# Patient Record
Sex: MALE | Race: WHITE | Employment: FULL TIME | URBAN - METROPOLITAN AREA
[De-identification: names, ages, dates, MRNs, and addresses within clinical notes are randomized per-mention and may not be internally consistent; named-entity substitution may affect disease eponyms.]

---

## 2018-02-26 ENCOUNTER — APPOINTMENT (OUTPATIENT)
Dept: CT IMAGING | Facility: CLINIC | Age: 58
DRG: 329 | End: 2018-02-26
Attending: EMERGENCY MEDICINE
Payer: COMMERCIAL

## 2018-02-26 ENCOUNTER — HOSPITAL ENCOUNTER (INPATIENT)
Facility: CLINIC | Age: 58
LOS: 15 days | Discharge: HOME | DRG: 329 | End: 2018-03-14
Attending: EMERGENCY MEDICINE | Admitting: INTERNAL MEDICINE
Payer: COMMERCIAL

## 2018-02-26 DIAGNOSIS — K56.609 INTESTINAL OBSTRUCTION, UNSPECIFIED CAUSE, UNSPECIFIED WHETHER PARTIAL OR COMPLETE (H): ICD-10-CM

## 2018-02-26 LAB
ALBUMIN SERPL-MCNC: 4.1 G/DL (ref 3.4–5)
ALBUMIN UR-MCNC: NEGATIVE MG/DL
ALP SERPL-CCNC: 75 U/L (ref 40–150)
ALT SERPL W P-5'-P-CCNC: 15 U/L (ref 0–70)
ANION GAP SERPL CALCULATED.3IONS-SCNC: 9 MMOL/L (ref 3–14)
APPEARANCE UR: CLEAR
AST SERPL W P-5'-P-CCNC: 17 U/L (ref 0–45)
BASOPHILS # BLD AUTO: 0 10E9/L (ref 0–0.2)
BASOPHILS NFR BLD AUTO: 0.3 %
BILIRUB SERPL-MCNC: 0.7 MG/DL (ref 0.2–1.3)
BILIRUB UR QL STRIP: NEGATIVE
BUN SERPL-MCNC: 16 MG/DL (ref 7–30)
CALCIUM SERPL-MCNC: 9.5 MG/DL (ref 8.5–10.1)
CAOX CRY #/AREA URNS HPF: ABNORMAL /HPF
CHLORIDE SERPL-SCNC: 104 MMOL/L (ref 94–109)
CO2 SERPL-SCNC: 24 MMOL/L (ref 20–32)
COLOR UR AUTO: YELLOW
CREAT SERPL-MCNC: 0.94 MG/DL (ref 0.66–1.25)
DIFFERENTIAL METHOD BLD: ABNORMAL
EOSINOPHIL # BLD AUTO: 0 10E9/L (ref 0–0.7)
EOSINOPHIL NFR BLD AUTO: 0.1 %
ERYTHROCYTE [DISTWIDTH] IN BLOOD BY AUTOMATED COUNT: 14.1 % (ref 10–15)
GFR SERPL CREATININE-BSD FRML MDRD: 83 ML/MIN/1.7M2
GLUCOSE SERPL-MCNC: 115 MG/DL (ref 70–99)
GLUCOSE UR STRIP-MCNC: NEGATIVE MG/DL
HCT VFR BLD AUTO: 46.1 % (ref 40–53)
HGB BLD-MCNC: 15.8 G/DL (ref 13.3–17.7)
HGB UR QL STRIP: NEGATIVE
IMM GRANULOCYTES # BLD: 0 10E9/L (ref 0–0.4)
IMM GRANULOCYTES NFR BLD: 0.2 %
KETONES UR STRIP-MCNC: 20 MG/DL
LEUKOCYTE ESTERASE UR QL STRIP: NEGATIVE
LIPASE SERPL-CCNC: 122 U/L (ref 73–393)
LYMPHOCYTES # BLD AUTO: 1.2 10E9/L (ref 0.8–5.3)
LYMPHOCYTES NFR BLD AUTO: 10.9 %
MCH RBC QN AUTO: 33.2 PG (ref 26.5–33)
MCHC RBC AUTO-ENTMCNC: 34.3 G/DL (ref 31.5–36.5)
MCV RBC AUTO: 97 FL (ref 78–100)
MONOCYTES # BLD AUTO: 0.8 10E9/L (ref 0–1.3)
MONOCYTES NFR BLD AUTO: 7.3 %
MUCOUS THREADS #/AREA URNS LPF: PRESENT /LPF
NEUTROPHILS # BLD AUTO: 8.8 10E9/L (ref 1.6–8.3)
NEUTROPHILS NFR BLD AUTO: 81.2 %
NITRATE UR QL: NEGATIVE
NRBC # BLD AUTO: 0 10*3/UL
NRBC BLD AUTO-RTO: 0 /100
PH UR STRIP: 5 PH (ref 5–7)
PLATELET # BLD AUTO: 243 10E9/L (ref 150–450)
POTASSIUM SERPL-SCNC: 3.6 MMOL/L (ref 3.4–5.3)
PROT SERPL-MCNC: 8 G/DL (ref 6.8–8.8)
RBC # BLD AUTO: 4.76 10E12/L (ref 4.4–5.9)
RBC #/AREA URNS AUTO: 2 /HPF (ref 0–2)
SODIUM SERPL-SCNC: 137 MMOL/L (ref 133–144)
SOURCE: ABNORMAL
SP GR UR STRIP: 1.03 (ref 1–1.03)
SQUAMOUS #/AREA URNS AUTO: <1 /HPF (ref 0–1)
UROBILINOGEN UR STRIP-MCNC: 2 MG/DL (ref 0–2)
WBC # BLD AUTO: 10.8 10E9/L (ref 4–11)
WBC #/AREA URNS AUTO: <1 /HPF (ref 0–2)

## 2018-02-26 PROCEDURE — 99285 EMERGENCY DEPT VISIT HI MDM: CPT | Mod: 25

## 2018-02-26 PROCEDURE — 25000128 H RX IP 250 OP 636: Performed by: EMERGENCY MEDICINE

## 2018-02-26 PROCEDURE — 80053 COMPREHEN METABOLIC PANEL: CPT | Performed by: EMERGENCY MEDICINE

## 2018-02-26 PROCEDURE — 74176 CT ABD & PELVIS W/O CONTRAST: CPT

## 2018-02-26 PROCEDURE — 81001 URINALYSIS AUTO W/SCOPE: CPT

## 2018-02-26 PROCEDURE — 85025 COMPLETE CBC W/AUTO DIFF WBC: CPT | Performed by: EMERGENCY MEDICINE

## 2018-02-26 PROCEDURE — 96361 HYDRATE IV INFUSION ADD-ON: CPT

## 2018-02-26 PROCEDURE — 83690 ASSAY OF LIPASE: CPT | Performed by: EMERGENCY MEDICINE

## 2018-02-26 PROCEDURE — 96375 TX/PRO/DX INJ NEW DRUG ADDON: CPT

## 2018-02-26 PROCEDURE — 96374 THER/PROPH/DIAG INJ IV PUSH: CPT

## 2018-02-26 RX ORDER — HYDROMORPHONE HYDROCHLORIDE 1 MG/ML
0.5 INJECTION, SOLUTION INTRAMUSCULAR; INTRAVENOUS; SUBCUTANEOUS
Status: COMPLETED | OUTPATIENT
Start: 2018-02-26 | End: 2018-02-27

## 2018-02-26 RX ORDER — ONDANSETRON 2 MG/ML
4 INJECTION INTRAMUSCULAR; INTRAVENOUS
Status: COMPLETED | OUTPATIENT
Start: 2018-02-26 | End: 2018-02-26

## 2018-02-26 RX ADMIN — SODIUM CHLORIDE 500 ML: 9 INJECTION, SOLUTION INTRAVENOUS at 23:33

## 2018-02-26 RX ADMIN — Medication 0.5 MG: at 23:33

## 2018-02-26 RX ADMIN — ONDANSETRON 4 MG: 2 INJECTION INTRAMUSCULAR; INTRAVENOUS at 23:33

## 2018-02-26 ASSESSMENT — ENCOUNTER SYMPTOMS
SHORTNESS OF BREATH: 0
DYSURIA: 0
ABDOMINAL PAIN: 1
DIARRHEA: 0
VOMITING: 0
BACK PAIN: 1
BLOOD IN STOOL: 0
FREQUENCY: 0
CONSTIPATION: 1

## 2018-02-26 NOTE — IP AVS SNAPSHOT
[place] [address] [facility]    [address]    [facility] [address]    Phone:  [phone]    Fax:  [phone]                                       After Visit Summary   2/26/2018    Gab Holloway    MRN: [id number]           After Visit Summary Signature Page     I have received my discharge instructions, and my questions have been answered. I have discussed any challenges I see with this plan with the nurse or doctor.    ..........................................................................................................................................  Patient/Patient Representative Signature      ..........................................................................................................................................  Patient Representative Print Name and Relationship to Patient    ..................................................               ................................................  Date                                            Time    ..........................................................................................................................................  Reviewed by Signature/Title    ...................................................              ..............................................  Date                                                            Time

## 2018-02-26 NOTE — IP AVS SNAPSHOT
MRN:[id number]                      After Visit Summary   2/26/2018    Gab Holloway    MRN: [id number]           Thank you!     Thank you for choosing [facility] for your care. Our goal is always to provide you with excellent care. Hearing back from our patients is one way we can continue to improve our services. Please take a few minutes to complete the written survey that you may receive in the mail after you visit. If you would like to speak to someone directly about your visit please contact Patient Relations at [phone]. Thank you!          Patient Information     Date Of Birth          1960        Designated Caregiver       Most Recent Value    Caregiver    Will someone help with your care after discharge? yes    Name of designated caregiver Neela    Phone number of caregiver see chart    Caregiver address same      About your hospital stay     You were admitted on:  February 27, 2018 You last received care in the:  [place] [address] [facility]    You were discharged on:  March 14, 2018        Reason for your hospital stay       This patient was in the hospital for surgery                  Who to Call     For medical emergencies, please call 911.  For non-urgent questions about your medical care, please call your primary care provider or clinic, [phone]  For questions related to your surgery, please call your surgery clinic        Attending Provider     Provider Specialty    Nicole Edmonds MD Emergency Medicine    [facility], Sharonda Pelaez MD Internal Medicine    Catrina fierro MD Internal Medicine       Primary Care Provider Office Phone # Fax #    Bry Andrews [phone]      After Care Instructions     Activity       Your activity upon discharge: No heavy lifting >10-15lbs for 6 weeks.     Do not drive while taking narcotic pain medication.    Get regular activity and try to walk for a total of 30 minutes.  Start slow by walking 5-10 minutes at a time, increasing each day to 30 minutes at one time. Slowly return to your regular level of activity. Rest as needed.            Diet       Follow this diet upon discharge: LOW FIBER DIET  A low fiber diet helps to decrease size and frequency of stools    Avoid nuts, seeds, raw vegetables.     Try foods such as:  - beef, poultry, fish, eggs, smooth peanut butter, dairy (as tolerated)  - refined or white flour products (like white bread, white pasta, etc)            Discharge Instructions       Please call the clinic if:  - fever greater than 101 degrees  - any signs of infection (increasing redness, swelling, tenderness, warmth, change in appearance, increased drainage)  - blood in urine or stool  - severe pain that is not relieved by medicine, rest, or ice    Or as questions or concerns arise. Contact clinic at [phone]    Call 841 if you feel you are having a medical emergency                  Follow-up Appointments     Follow-up and recommended labs and tests        Follow up in the [place] office on 3/21/18 with Dr. Delcid at 10:00am, please arrive 20 minutes early to complete paperwork. Call [phone] to reschedule your appointment as needed. Call the office at [phone] if you develop fever, uncontrolled pain, bleeding, nausea, vomiting, or constipation.    [place] Office:   [address]  [place] [address]                  Further instructions from your care team       1. [facility] Dr [facility] -Arranged follow-up as an outpatient in 3 months for the 1st surveillance visit.    [address] [facility] [address], [place] [address]   Phone: [phone]      2. [facility]-Establish care with Dr Andrews for primary care.    Address  [address] [place] [address]   Contact  Appointments: [phone])   Information: [phone]   Fax: [phone]   Urgent Care: [phone]         Pending Results     No  "orders found from 2018 to 2018.            Statement of Approval     Ordered          18 1359  I have reviewed and agree with all the recommendations and orders detailed in this document.  EFFECTIVE NOW     Approved and electronically signed by:  Orville Crain MD             Admission Information     Date & Time Provider Department Dept. Phone    2018 Catrina Rey MD [place] [address] [facility] [phone]      Your Vitals Were     Blood Pressure Pulse Temperature Respirations Height Weight    111/63 (BP Location: Left arm) 91 96.7  F (35.9  C) (Oral) 18 1.778 m (5' 10\") 84.6 kg (186 lb 9.6 oz)    Pulse Oximetry BMI (Body Mass Index)                96% 26.77 kg/m2          MyChart Information     [employer] lets you send messages to your doctor, view your test results, renew your prescriptions, schedule appointments and more. To sign up, go to [email or web address] . Click on \"Log in\" on the left side of the screen, which will take you to the Welcome page. Then click on \"Sign up Now\" on the right side of the page.     You will be asked to enter the access code listed below, as well as some personal information. Please follow the directions to create your username and password.     Your access code is: [email or web address]  Expires: 2018  9:51 AM     Your access code will  in 90 days. If you need help or a new code, please call your [place] clinic or [phone].        Care EveryWhere ID     This is your Care EveryWhere ID. This could be used by other organizations to access your [place] medical records  [id number]        Equal Access to Services     [facility] : Hadii florencia Whatley, kiko brantley, mitra ray. So [facility] [phone].    ATENCIÓN: Si habla español, tiene a hannon disposición servicios gratuitos de asistencia lingüística. Llame al [phone].    We comply with applicable federal civil rights " laws and [place] laws. We do not discriminate on the basis of race, color, national origin, age, disability, sex, sexual orientation, or gender identity.               Review of your medicines      START taking        Dose / Directions    enoxaparin 40 MG/0.4ML injection   Commonly known as:  LOVENOX   Used for:  Intestinal obstruction, unspecified cause, unspecified whether partial or complete        Dose:  40 mg   Inject 0.4 mLs (40 mg) Subcutaneous every 24 hours   Quantity:  17 Syringe   Refills:  0         STOP taking     UNABLE TO FIND                Where to get your medicines      These medications were sent to [facility] [place], [place] - [address] [facility]  [address] [facility] [address]     Phone:  [phone]     enoxaparin 40 MG/0.4ML injection                Protect others around you: Learn how to safely use, store and throw away your medicines at www.disposemymeds.org.             Medication List: This is a list of all your medications and when to take them. Check marks below indicate your daily home schedule. Keep this list as a reference.      Medications           Morning Afternoon Evening Bedtime As Needed    enoxaparin 40 MG/0.4ML injection   Commonly known as:  LOVENOX   Inject 0.4 mLs (40 mg) Subcutaneous every 24 hours   Last time this was given:  40 mg on 3/14/2018 12:02 PM   Next Dose Due:  Tomorrow, Thurs. 3/15                                          More Information        Barium Enema     X-rays will be taken during your exam.     A barium enema is an X-ray exam of your rectum and colon. This test helps your healthcare provider find problems such as blockages, tumors, polyps, or other problems.  Before your test    Your healthcare provider will give you detailed instructions on how to prepare for this test.    On the day before your test, you will likely be told not to eat and to drink only clear liquids. Do not eat or drink anything after midnight  the night before, although you may take your regularly prescribed oral medicine in the morning with a small amount of water.    You will also be told to take a laxative or an over-the-counter enema preparation. Follow your healthcare provider's instructions.     Let the technologist know about the following before the test:    Symptoms you re having    Allergies    Any previous surgery    Medicines you take    If you're pregnant or think you may be      During your test    A tube is inserted into your rectum.    Your colon is filled with barium, a liquid that allows the radiologist to see what the inside of your bowel looks like with the use of X-ray images. Air is also usually placed into your bowel through the tube to allow for better visualization.    You will be asked to move into different positions and hold your breath while X-rays are taken.    Pressure may be applied to your belly to get the best images.    The last X-ray will be taken after you go to the bathroom.  After your test    Drink plenty of water to relieve constipation you may have after the test.    Your stool may appear white or light for a day or two.    Your healthcare provider will discuss the test results with you during a follow-up visit or over the phone.    Your next appointment is: ________________  Date Last Reviewed: 2/1/2017 2000-2017 The "[employer]. [address] [place] [address], [place] [address]. All rights reserved. This information is not intended as a substitute for professional medical care. Always follow your healthcare professional's instructions.

## 2018-02-26 NOTE — LETTER
Transition Communication Hand-off for Care Transitions to Next Level of Care Provider    Name: Gab Holloway  : 1960  MRN #: [id number]  Primary Care Provider: Bry Andrews     Primary Clinic: [address]  [facility] [address]     Reason for Hospitalization:  Intestinal obstruction, unspecified cause, unspecified whether partial or complete [K56.609]  Admit Date/Time: 2018 10:16 PM  Discharge Date: 3/14/18  Payor Source: Payor: PREFERREDONE / Plan: PREFERREDONE [employer] / Product Type: HMO /     Readmission Assessment Measure (MADY) Risk Score/category: ELEVATED         Reason for Communication Hand-off Referral: Multiple providers/specialties  Other prolonged hospital stay with new ostomy    Discharge Plan:       Concern for non-adherence with plan of care:   NO  Discharge Needs Assessment:  Needs       Most Recent Value    Equipment Currently Used at Home none    Transportation Available car, family or friend will provide    # of Referrals Placed by CTS Scheduled Follow-up appointments          Already enrolled in Tele-monitoring program and name of program:  na  Follow-up specialty is recommended: Yes    Follow-up plan:  No future appointments.    Any outstanding tests or procedures:             Follow-up and recommended labs and tests        Follow up in the [place] office on 3/21/18 with Dr. Delcid at 10:00am, please arrive 20 minutes early to complete paperwork. Call [phone] to reschedule your appointment as needed. Call the office at [phone] if you develop fever, uncontrolled pain, bleeding, nausea, vomiting, or constipation.     [place] Office:   [address]   [place] [address]                     Further instructions from your care team      1. [facility] -Arranged follow-up as an outpatient in 3 months for the 1st surveillance visit.     [address], [place] [address]   Phone: [phone]        2. [facility]-Establish care with Dr Anrdews for primary care.         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  MADY ELEVATED. New ostomy which he is managing well and didn't require home care [facility] at [place].  He will need f/u with Dr Ti JAVIER in 3 months for surveillance.  He also will establish care with Dr Andrews for primary care.      Debbie Paulino    AVS/Discharge Summary is the source of truth; this is a helpful guide for improved communication of patient story

## 2018-02-27 ENCOUNTER — APPOINTMENT (OUTPATIENT)
Dept: GENERAL RADIOLOGY | Facility: CLINIC | Age: 58
DRG: 329 | End: 2018-02-27
Attending: PHYSICIAN ASSISTANT
Payer: COMMERCIAL

## 2018-02-27 ENCOUNTER — APPOINTMENT (OUTPATIENT)
Dept: CT IMAGING | Facility: CLINIC | Age: 58
DRG: 329 | End: 2018-02-27
Attending: PHYSICIAN ASSISTANT
Payer: COMMERCIAL

## 2018-02-27 PROBLEM — K56.609 COLON OBSTRUCTION (H): Status: ACTIVE | Noted: 2018-02-27

## 2018-02-27 LAB
ANION GAP SERPL CALCULATED.3IONS-SCNC: 7 MMOL/L (ref 3–14)
BASOPHILS # BLD AUTO: 0 10E9/L (ref 0–0.2)
BASOPHILS NFR BLD AUTO: 0.2 %
BUN SERPL-MCNC: 14 MG/DL (ref 7–30)
CALCIUM SERPL-MCNC: 8.5 MG/DL (ref 8.5–10.1)
CHLORIDE SERPL-SCNC: 107 MMOL/L (ref 94–109)
CO2 SERPL-SCNC: 25 MMOL/L (ref 20–32)
CREAT SERPL-MCNC: 0.86 MG/DL (ref 0.66–1.25)
DIFFERENTIAL METHOD BLD: ABNORMAL
EOSINOPHIL # BLD AUTO: 0 10E9/L (ref 0–0.7)
EOSINOPHIL NFR BLD AUTO: 0.1 %
ERYTHROCYTE [DISTWIDTH] IN BLOOD BY AUTOMATED COUNT: 14.5 % (ref 10–15)
GFR SERPL CREATININE-BSD FRML MDRD: >90 ML/MIN/1.7M2
GLUCOSE SERPL-MCNC: 121 MG/DL (ref 70–99)
HCT VFR BLD AUTO: 44.1 % (ref 40–53)
HGB BLD-MCNC: 14.8 G/DL (ref 13.3–17.7)
IMM GRANULOCYTES # BLD: 0 10E9/L (ref 0–0.4)
IMM GRANULOCYTES NFR BLD: 0.4 %
LYMPHOCYTES # BLD AUTO: 1.3 10E9/L (ref 0.8–5.3)
LYMPHOCYTES NFR BLD AUTO: 12.4 %
MCH RBC QN AUTO: 33.2 PG (ref 26.5–33)
MCHC RBC AUTO-ENTMCNC: 33.6 G/DL (ref 31.5–36.5)
MCV RBC AUTO: 99 FL (ref 78–100)
MONOCYTES # BLD AUTO: 0.8 10E9/L (ref 0–1.3)
MONOCYTES NFR BLD AUTO: 7.9 %
NEUTROPHILS # BLD AUTO: 8.3 10E9/L (ref 1.6–8.3)
NEUTROPHILS NFR BLD AUTO: 79 %
NRBC # BLD AUTO: 0 10*3/UL
NRBC BLD AUTO-RTO: 0 /100
PLATELET # BLD AUTO: 212 10E9/L (ref 150–450)
POTASSIUM SERPL-SCNC: 3.9 MMOL/L (ref 3.4–5.3)
RBC # BLD AUTO: 4.46 10E12/L (ref 4.4–5.9)
SODIUM SERPL-SCNC: 139 MMOL/L (ref 133–144)
WBC # BLD AUTO: 10.5 10E9/L (ref 4–11)

## 2018-02-27 PROCEDURE — 40000901 ZZH STATISTIC WOC PT EDUCATION, 0-15 MIN

## 2018-02-27 PROCEDURE — 99207 ZZC APP CREDIT; MD BILLING SHARED VISIT: CPT | Performed by: HOSPITALIST

## 2018-02-27 PROCEDURE — 71260 CT THORAX DX C+: CPT

## 2018-02-27 PROCEDURE — 99222 1ST HOSP IP/OBS MODERATE 55: CPT | Mod: AI | Performed by: INTERNAL MEDICINE

## 2018-02-27 PROCEDURE — 74283 THER NMA RDCTJ INTUS/OBSTRCJ: CPT

## 2018-02-27 PROCEDURE — 25000128 H RX IP 250 OP 636: Performed by: INTERNAL MEDICINE

## 2018-02-27 PROCEDURE — 36415 COLL VENOUS BLD VENIPUNCTURE: CPT | Performed by: INTERNAL MEDICINE

## 2018-02-27 PROCEDURE — 85025 COMPLETE CBC W/AUTO DIFF WBC: CPT | Performed by: INTERNAL MEDICINE

## 2018-02-27 PROCEDURE — 25000128 H RX IP 250 OP 636: Performed by: EMERGENCY MEDICINE

## 2018-02-27 PROCEDURE — 96376 TX/PRO/DX INJ SAME DRUG ADON: CPT

## 2018-02-27 PROCEDURE — 12000000 ZZH R&B MED SURG/OB

## 2018-02-27 PROCEDURE — 25000128 H RX IP 250 OP 636: Performed by: PHYSICIAN ASSISTANT

## 2018-02-27 PROCEDURE — 25000125 ZZHC RX 250: Performed by: PHYSICIAN ASSISTANT

## 2018-02-27 PROCEDURE — 80048 BASIC METABOLIC PNL TOTAL CA: CPT | Performed by: INTERNAL MEDICINE

## 2018-02-27 RX ORDER — ONDANSETRON 2 MG/ML
4 INJECTION INTRAMUSCULAR; INTRAVENOUS EVERY 6 HOURS PRN
Status: DISCONTINUED | OUTPATIENT
Start: 2018-02-27 | End: 2018-03-01

## 2018-02-27 RX ORDER — NALOXONE HYDROCHLORIDE 0.4 MG/ML
.1-.4 INJECTION, SOLUTION INTRAMUSCULAR; INTRAVENOUS; SUBCUTANEOUS
Status: DISCONTINUED | OUTPATIENT
Start: 2018-02-27 | End: 2018-03-01

## 2018-02-27 RX ORDER — LORAZEPAM 2 MG/ML
0.5 INJECTION INTRAMUSCULAR ONCE
Status: COMPLETED | OUTPATIENT
Start: 2018-02-27 | End: 2018-02-27

## 2018-02-27 RX ORDER — LIDOCAINE 40 MG/G
2.5 CREAM TOPICAL
Status: DISCONTINUED | OUTPATIENT
Start: 2018-02-27 | End: 2018-03-06

## 2018-02-27 RX ORDER — LIDOCAINE 40 MG/G
CREAM TOPICAL
Status: DISCONTINUED | OUTPATIENT
Start: 2018-02-27 | End: 2018-02-28 | Stop reason: HOSPADM

## 2018-02-27 RX ORDER — LORAZEPAM 0.5 MG/1
.5-1 TABLET ORAL EVERY 4 HOURS PRN
Status: DISCONTINUED | OUTPATIENT
Start: 2018-02-27 | End: 2018-03-14 | Stop reason: HOSPADM

## 2018-02-27 RX ORDER — SODIUM CHLORIDE 9 MG/ML
INJECTION, SOLUTION INTRAVENOUS ONCE
Status: DISCONTINUED | OUTPATIENT
Start: 2018-02-27 | End: 2018-02-27

## 2018-02-27 RX ORDER — IOPAMIDOL 755 MG/ML
500 INJECTION, SOLUTION INTRAVASCULAR ONCE
Status: COMPLETED | OUTPATIENT
Start: 2018-02-27 | End: 2018-02-27

## 2018-02-27 RX ORDER — SODIUM CHLORIDE 9 MG/ML
INJECTION, SOLUTION INTRAVENOUS CONTINUOUS
Status: DISCONTINUED | OUTPATIENT
Start: 2018-02-27 | End: 2018-02-28

## 2018-02-27 RX ORDER — CALCIUM CARBONATE 500 MG/1
500 TABLET, CHEWABLE ORAL DAILY PRN
Status: DISCONTINUED | OUTPATIENT
Start: 2018-02-27 | End: 2018-03-14 | Stop reason: HOSPADM

## 2018-02-27 RX ORDER — HYDROMORPHONE HYDROCHLORIDE 1 MG/ML
.3-.5 INJECTION, SOLUTION INTRAMUSCULAR; INTRAVENOUS; SUBCUTANEOUS
Status: DISCONTINUED | OUTPATIENT
Start: 2018-02-27 | End: 2018-03-14 | Stop reason: HOSPADM

## 2018-02-27 RX ORDER — LORAZEPAM 2 MG/ML
0.5 INJECTION INTRAMUSCULAR EVERY 6 HOURS PRN
Status: DISCONTINUED | OUTPATIENT
Start: 2018-02-27 | End: 2018-03-14 | Stop reason: HOSPADM

## 2018-02-27 RX ORDER — ONDANSETRON 4 MG/1
4 TABLET, ORALLY DISINTEGRATING ORAL EVERY 6 HOURS PRN
Status: DISCONTINUED | OUTPATIENT
Start: 2018-02-27 | End: 2018-03-14 | Stop reason: HOSPADM

## 2018-02-27 RX ORDER — ACETAMINOPHEN 10 MG/ML
1000 INJECTION, SOLUTION INTRAVENOUS EVERY 8 HOURS PRN
Status: DISCONTINUED | OUTPATIENT
Start: 2018-02-27 | End: 2018-03-02

## 2018-02-27 RX ORDER — PROCHLORPERAZINE 25 MG
25 SUPPOSITORY, RECTAL RECTAL EVERY 12 HOURS PRN
Status: DISCONTINUED | OUTPATIENT
Start: 2018-02-27 | End: 2018-03-14 | Stop reason: HOSPADM

## 2018-02-27 RX ORDER — PROCHLORPERAZINE MALEATE 5 MG
10 TABLET ORAL EVERY 6 HOURS PRN
Status: DISCONTINUED | OUTPATIENT
Start: 2018-02-27 | End: 2018-03-14 | Stop reason: HOSPADM

## 2018-02-27 RX ADMIN — Medication 0.5 MG: at 01:14

## 2018-02-27 RX ADMIN — Medication 0.5 MG: at 00:13

## 2018-02-27 RX ADMIN — Medication 0.5 MG: at 20:42

## 2018-02-27 RX ADMIN — Medication 0.5 MG: at 12:40

## 2018-02-27 RX ADMIN — PROCHLORPERAZINE EDISYLATE 10 MG: 5 INJECTION INTRAMUSCULAR; INTRAVENOUS at 22:40

## 2018-02-27 RX ADMIN — Medication 0.5 MG: at 14:23

## 2018-02-27 RX ADMIN — SODIUM CHLORIDE: 9 INJECTION, SOLUTION INTRAVENOUS at 19:36

## 2018-02-27 RX ADMIN — ONDANSETRON 4 MG: 2 INJECTION INTRAMUSCULAR; INTRAVENOUS at 19:32

## 2018-02-27 RX ADMIN — SODIUM CHLORIDE: 9 INJECTION, SOLUTION INTRAVENOUS at 02:38

## 2018-02-27 RX ADMIN — SODIUM CHLORIDE 65 ML: 9 INJECTION, SOLUTION INTRAVENOUS at 18:54

## 2018-02-27 RX ADMIN — LORAZEPAM 0.5 MG: 2 INJECTION INTRAMUSCULAR; INTRAVENOUS at 20:53

## 2018-02-27 RX ADMIN — Medication 0.5 MG: at 03:05

## 2018-02-27 RX ADMIN — LORAZEPAM 0.5 MG: 2 INJECTION INTRAMUSCULAR; INTRAVENOUS at 03:05

## 2018-02-27 RX ADMIN — IOPAMIDOL 100 ML: 755 INJECTION, SOLUTION INTRAVENOUS at 18:54

## 2018-02-27 RX ADMIN — ACETAMINOPHEN 1000 MG: 10 INJECTION, SOLUTION INTRAVENOUS at 03:23

## 2018-02-27 RX ADMIN — DIATRIZOATE MEGLUMINE AND DIATRIZOATE SODIUM 480 ML: 660; 100 SOLUTION ORAL; RECTAL at 11:17

## 2018-02-27 RX ADMIN — PANTOPRAZOLE SODIUM 40 MG: 40 INJECTION, POWDER, FOR SOLUTION INTRAVENOUS at 22:45

## 2018-02-27 NOTE — PLAN OF CARE
Problem: Patient Care Overview  Goal: Plan of Care/Patient Progress Review  PRIMARY DIAGNOSIS: Acute abdominal pain  OUTPATIENT/OBSERVATION GOALS TO BE MET BEFORE DISCHARGE:  1. Pain Status: Improved but still requiring IV narcotics.    2. Return to near baseline physical activity: Yes    3. Cleared for discharge by consultants (if involved): No    Discharge Planner Nurse   Safe discharge environment identified: Yes  Barriers to discharge: Yes       Entered by: Nika Rincon 02/27/2018 5:20 AM     A & O x4, VSS. C/o sharp 10/10 pain to lower abdomen around to back. Prn acetaminophen and iv dilaudid given. One time ativan ordered given as well. Denies SOB or nausea. BS hypoactive x4. Pt NPO, up independent in room. Wife at bedside. IVF infusing. Plan for colorectal surgery consult in the AM. Will continue to monitor.      Please review provider order for any additional goals.   Nurse to notify provider when observation goals have been met and patient is ready for discharge.

## 2018-02-27 NOTE — ED NOTES
[facility]  ED Nurse Handoff Report    Gab Holloway is a 57 year old male   ED Chief complaint: Abdominal Pain  . ED Diagnosis:   Final diagnoses:   Intestinal obstruction, unspecified cause, unspecified whether partial or complete     Allergies: No Known Allergies    Code Status: Full Code  Activity level - Baseline/Home:  Independent. Activity Level - Current:   Independent. Lift room needed: No. Bariatric: No   Needed: No   Isolation: No. Infection: Not Applicable.     Vital Signs:   Vitals:    02/26/18 2156 02/26/18 2157 02/26/18 2345 02/27/18 0015   BP:  147/89 136/85 130/77   Pulse: 100   91   Resp: 16      Temp: 98.1  F (36.7  C)      TempSrc: Oral      SpO2: 98%  98% 99%       Cardiac Rhythm:  ,      Pain level: 0-10 Pain Scale: 9  Patient confused: No. Patient Falls Risk: No.   Elimination Status: Has voided   Patient Report - Initial Complaint: Abdominal pain. Focused Assessment: A&O x4, ABCDs intact. Pt has abdominal pain across lower portion of abdomen. Pt states the pain is radiating to his back.   Tests Performed: labs, radiology. Abnormal Results:   Labs Ordered and Resulted from Time of ED Arrival Up to the Time of Departure from the ED   ROUTINE UA WITH MICROSCOPIC - Abnormal; Notable for the following:        Result Value    Ketones Urine 20 (*)     Mucous Urine Present (*)     Calcium Oxalate Few (*)     All other components within normal limits   CBC WITH PLATELETS DIFFERENTIAL - Abnormal; Notable for the following:     MCH 33.2 (*)     Absolute Neutrophil 8.8 (*)     All other components within normal limits   COMPREHENSIVE METABOLIC PANEL - Abnormal; Notable for the following:     Glucose 115 (*)     All other components within normal limits   LIPASE     Abd/pelvis CT no contrast - Stone Protocol   Final Result   IMPRESSION:   1. Short segment of focal colonic wall thickening with luminal   narrowing in the upper descending colon.   2. Mild associated distention  of the colon proximal to this consistent   with at least partial/early associated mechanical obstruction.   3. This is most likely neoplasm. Differential includes focal colitis   and/or stricture.   4. Cholelithiasis.      Findings discussed with the ER physician at 12:15 a.m.      AYALA BERNABE MD        .   Treatments provided: IVF, Dilaudid  Family Comments: NA  OBS brochure/video discussed/provided to patient:  N/A  ED Medications:   Medications   sodium chloride 0.9% infusion (not administered)   0.9% sodium chloride BOLUS (0 mLs Intravenous Stopped 2/27/18 0014)   HYDROmorphone (PF) (DILAUDID) injection 0.5 mg (0.5 mg Intravenous Given 2/27/18 0114)   ondansetron (ZOFRAN) injection 4 mg (4 mg Intravenous Given 2/26/18 5673)     Drips infusing:  No  For the majority of the shift, the patient's behavior Green. Interventions performed were NA.     Severe Sepsis OR Septic Shock Diagnosis Present: No      ED Nurse Name/Phone Number: Kumar Miller,   1:34 AM      RECEIVING UNIT ED HANDOFF REVIEW    Above ED Nurse Handoff Report was reviewed: Yes  Reviewed by: Nika Rincon on February 27, 2018 at 1:48 AM

## 2018-02-27 NOTE — PHARMACY-ADMISSION MEDICATION HISTORY
"Admission medication history interview status for this patient is complete. See [facility] admission navigator for allergy information, prior to admission medications and immunization status.     Medication history interview source(s): Patient  Medication history resources (including written lists, pill bottles, clinic record): None    Changes made to PTA medication list:  Added: The people's  multivitamin  Deleted: None   Changed: None  For patients on insulin therapy: N    Actions taken by pharmacist (provider contacted, etc): None     Additional medication history information: Upon looking at [employer]  website, I found that the pt is likely talking about the product called \"Cinnergy\" as it's the only \"multivitamin\" that has milk thistle and cinnamon that the pt mentioned.    Medication reconciliation/reorder completed by provider prior to medication history? No    Prior to Admission medications    Medication Sig Last Dose Taking? Auth Provider   UNABLE TO FIND 2 tablets daily MEDICATION NAME: The people's  multivitamin 2/26/2018 Yes Unknown, Entered By History           "

## 2018-02-27 NOTE — ED PROVIDER NOTES
"  History     Chief Complaint:  Abdominal Pain    HPI   Gab Holloway is a 57 year old male who presents to the emergency department today for evaluation of abdominal pain. The patient states that on Saturday morning, 2 days ago, he had an episode of brief, sharp left back pain that spontaneously resolved, however throughout the day he endorsed mild back pain with \"gas gurgling\" in his abdomen at night. He reports the pain has been intermittently radiating from the center of his back, bilaterally wrapping around into his lower abdomen, however the pain in his back was not severe until tonight when he was driving to work. He states the pain is currently a 10/10. He reports pain seems to be aggravated by eating. The patient reports he usually has multiple bowel movements in one day, but his last bowel movement was yesterday morning. He reports that he notice some streaking red color in his stool last night, however he did eat some pimentos. He states that when he wiped there was some blood. The patient denies previous abdominal surgeries. The patient denies any medical problems, daily medications, history of high blood pressure, or history of diabetes. The patient denies recent falls or injuries. The patient denies vomiting, diarrhea, obvious black or bloody stool, urinary symptoms, chest pain, shortness of breath, testicular pain, groin pain, or penile pain.    Allergies:  No Known Drug Allergies    Medications:    Medications reviewed. No current medications.     Past Medical History:    Medical history reviewed. No pertinent medical history.    Past Surgical History:    Surgical history reviewed. No pertinent surgical history.    Family History:    Family history reviewed. No pertinent family history.     Social History:  The patient was accompanied to the ED by wife.  Marital Status:   [2]    Review of Systems   Respiratory: Negative for shortness of breath.    Cardiovascular: Negative for chest pain. "   Gastrointestinal: Positive for abdominal pain (lower) and constipation. Negative for blood in stool, diarrhea and vomiting.   Genitourinary: Negative for dysuria, frequency, penile pain and testicular pain.   Musculoskeletal: Positive for back pain (central; radiating bilaterally across to the lower abdomen).   All other systems reviewed and are negative.    Physical Exam   Patient Vitals for the past 24 hrs:   BP Temp Temp src Pulse Heart Rate Resp SpO2 Height Weight   02/27/18 0130 141/79 - - 99 99 - (!) 86 % - -   02/27/18 0115 (!) 134/95 - - - - - 97 % - -   02/27/18 0100 142/87 - - - - - 95 % - -   02/27/18 0045 (!) 150/93 - - - - - 98 % - -   02/27/18 0030 135/78 - - - - - 95 % - -   02/27/18 0015 130/77 - - 91 91 - 99 % - -   02/26/18 2345 136/85 - - - - - 98 % - -   02/26/18 2157 147/89 - - - - - - - -   02/26/18 2156 - 98.1  F (36.7  C) Oral 100 100 16 98 % - -     Physical Exam  Gen: alert, appears uncomfortable  HEENT: PERRL, oropharynx clear  Neck: normal ROM  CV: RRR, no murmurs  Pulm: breath sounds equal, lungs clear  Abd: Soft, nontender, mild distension, BS present but diminished  Back: no evidence of injury, no CVA tenderness, no midline tenderness  MSK: no deformity, moves all extremities  Skin: no rash  Neuro: alert, appropriate conversation and interaction    Emergency Department Course     Imaging:  Radiology findings were communicated with the patient who voiced understanding of the findings.    Abd/pelvis CT no contrast - Stone Protocol  1. Short segment of focal colonic wall thickening with luminal narrowing in the upper descending colon.  2. Mild associated distention of the colon proximal to this consistent with at least partial associated mechanical obstruction.  3. This is most likely neoplasm. Differential includes focal colitis and/or stricture.  4. Cholelithiasis.  Findings discussed with the ER physician at 12:15 a.m.  Reading per radiology    Laboratory:  Laboratory findings were  communicated with the patient who voiced understanding of the findings.    CBC: WBC 10.8, HGB 15.8,   CMP: Glucose: 115 (H), o/w WNL (Creatinine 0.94)  Lipase: 122  UA: Ketones: 20 (A), Mucous: Present (A), Calcium Oxide: Few (A)    Interventions:  2333 Zofran 4 mg IV  2333  ml IV  2333 Dilaudid 0.5 mg IV  0013 Dilaudid 0.5 mg IV  0114 Dilaudid 0.5 mg IV    Emergency Department Course:    2230 Nursing notes and vitals reviewed.    2304 The patient provided a urine sample here in the emergency department. This was sent for laboratory testing, findings above.    2304 I performed an exam of the patient as documented above.     2320 IV was inserted and blood was drawn for laboratory testing, results above.    2355 The patient was sent for a Abd/pelvis CT no contrast - Stone Protocol while in the emergency department, results above.     0014  I received a call from Dr. Garrett of Radiology regarding imaging findings.     0032 I personally reviewed the imaging and laboratory results with the patient and answered all related questions. I discussed the treatment plan with the patient. They expressed understanding of this plan and consented to admission.     0059 I consulted with Dr. Lan of Colorectal Surgery regarding the patient's presentation and findings.    0106 I discussed the patient with Dr. Sales, who will admit the patient to a monitored bed for further evaluation and treatment.    Impression & Plan      Medical Decision Making:  Gab Holloway is a 57-year-old male who presents for episodic abdominal pain. Laboratory studies unremarkable. CT of the abdomen showed findings concerning for colonic neoplasm with partial obstruction. The patient has mild abdominal distention here, however, his exam is not peritoneal. He is not vomiting. I did discuss the case with Dr. Lan of colorectal surgery. He states that he did not feel there is indication for acute intervention and recommended admission  to the hospitalist service, NPO after midnight and to be seen first thing in the morning by colorectal surgery. He did not feel that NG tube was required at this time. The patient's pain remained well controlled while in the emergency department. Incidental finding of cholelithiasis was discussed. He was admitted to the hospitalist service.    Diagnosis:    ICD-10-CM   1. Intestinal obstruction, unspecified cause, unspecified whether partial or complete K56.609     Disposition:   The patient is admitted into the care of Dr. Sales.    Scribe Disclosure:  I, Nancy España, am serving as a scribe at 10:52 PM on 2/26/2018 to document services personally performed by Nicole Edmonds MD based on my observations and the provider's statements to me.    [facility] EMERGENCY DEPARTMENT       Nicole Edmonds MD  02/27/18 0535

## 2018-02-27 NOTE — PROGRESS NOTES
Focus: stoma marking  D: per MD note: 57 year old male, seen at the request of Dr. Sales, presents with abdominal pain. He describes starting two days ago he had onset of lower back pain that radiated to the front. Then yesterday pain continued to worsen and described as sharp and cramping. He passed stool two days ago and last passed a small amount of gas yesterday. He feels slightly more bloated. He denies associated nausea, vomiting, fever, or chills. Generally he has a bowel movement 2-3 times daily that are firm with only occasional pushing or straining required. He has never had similar symptoms.      Pt remains afebrile, HR 90s-100, normotensive. Labs largely unremarkable without leukocytosis. Abd/pelvis with concerns for colonic obstruction with short segment of wall thickening and luminal narrowing in the upper descending colon with associated distention proximal to this. There are a few tiny lymph nodes in the mesenteric fat adjacent to this thickened colonic segment.   WOC consulted for stoma marking:   Surgery is scheduled in 2 days and Colostomy book provided today for review and WOC will return in am for stoma marking.

## 2018-02-27 NOTE — PROGRESS NOTES
[facility]    Hospitalist Progress Note    Date of Service (when I saw the patient): 02/27/2018    Assessment & Plan   Gab Holloway is a 57 year old healthy male who was admitted on 2/26/2018 with abdominal pain/bloating. Found to have colonic mass with partial/early obstruction suspicious for malignancy    1. Neuro- pain control- on IV meds  2. CVS- no issues  3. Pulm- no issues  4. GI- NPO. Colonic mass. Being seen by Colorectal. Had GGE this am. Plan likely for sigmoidoscopy  5. ID- no issues  6. Renal- on IVF while NPO  7. Heme/onc- will likely need Onc involved once a tissue diagnosis is made  8. Endo- no issues  9. Musculoskel- ambulate  10. Prophylaxis- will likely need to start if has prolonged hospital stay. Should ambulate now  11. Dispo- will remain hospitalized until plan made by colorectal  12. Wife in room- updated    Code Status: Full Code    Disposition: Expected discharge in unclear  Roman Stuart    Interval History   Patient feeling ok today. Pain controlled. No chest pain, sob, n/v/d    -Data reviewed today: I reviewed all new labs and imaging results over the last 24 hours. I personally reviewed the abdominal CT image(s) showing colonic mass.    Physical Exam   Temp: 97.4  F (36.3  C) Temp src: Oral BP: 122/67 Pulse: 93 Heart Rate: 99 Resp: 18 SpO2: 97 % O2 Device: None (Room air)    Vitals:    02/27/18 0208   Weight: 91.2 kg (201 lb 1.6 oz)     Vital Signs with Ranges  Temp:  [97.4  F (36.3  C)-98.2  F (36.8  C)] 97.4  F (36.3  C)  Pulse:  [] 93  Heart Rate:  [] 99  Resp:  [16-18] 18  BP: (122-150)/(67-95) 122/67  SpO2:  [86 %-99 %] 97 %       Constitutional: Awake, alert, cooperative, no apparent distress   Respiratory: Clear to auscultation bilaterally, no crackles or wheezing   Cardiovascular: Regular rate and rhythm, normal S1 and S2, and no murmur noted   Abdomen: Normal bowel sounds, soft, non-distended, non-tender   Skin: No rashes, no cyanosis, dry to  touch   Neuro: Alert and oriented x3, no weakness, numbness, memory loss   Extremities: No edema, normal range of motion   Other(s):        All other systems: Negative     Medications     sodium chloride 100 mL/hr at 02/27/18 0238       sodium chloride (PF)  3 mL Intracatheter Q8H       Data     Recent Labs  Lab 02/27/18  0545 02/26/18  2320   WBC 10.5 10.8   HGB 14.8 15.8   MCV 99 97    243    137   POTASSIUM 3.9 3.6   CHLORIDE 107 104   CO2 25 24   BUN 14 16   CR 0.86 0.94   ANIONGAP 7 9   JANICE 8.5 9.5   * 115*   ALBUMIN  --  4.1   PROTTOTAL  --  8.0   BILITOTAL  --  0.7   ALKPHOS  --  75   ALT  --  15   AST  --  17   LIPASE  --  122       Recent Results (from the past 24 hour(s))   Abd/pelvis CT no contrast - Stone Protocol    Narrative    CT ABDOMEN PELVIS W/O CONTRAST  2/26/2018 11:59 PM     INDICATION: Intermittent lower abdominal pain.      TECHNIQUE: Thin axial images through the abdomen and pelvis without  contrast. Coronal reformatted images. Radiation dose for this scan was  reduced using automated exposure control, adjustment of the mA and/or  kV according to patient size, or iterative reconstruction technique.    COMPARISON: None.    FINDINGS: No urinary stones or hydronephrosis. Cholelithiasis. No  pericholecystic inflammation. The upper abdominal organs are otherwise  negative without contrast.    There is a short segment of focal narrowing and bowel wall thickening  involving the upper descending colon over a length of approximately 4  cm. The colon proximal to this is mildly distended with gas and stool.  The colon below this is decompressed making it difficult to accurately  assess the bowel wall thickness. There are a few tiny lymph nodes in  the mesenteric fat adjacent to the thickened colonic segment. No small  bowel dilatation.    Pelvic structures within normal limits. No ascites or free air. Mild  degenerative changes in the visualized spine.      Impression     IMPRESSION:  1. Short segment of focal colonic wall thickening with luminal  narrowing in the upper descending colon.  2. Mild associated distention of the colon proximal to this consistent  with at least partial/early associated mechanical obstruction.  3. This is most likely neoplasm. Differential includes focal colitis  and/or stricture.  4. Cholelithiasis.    Findings discussed with the ER physician at 12:15 a.m.    AYALA BERNABE MD

## 2018-02-27 NOTE — H&P
Admitted:     02/26/2018      PRIMARY CARE PHYSICIAN:  None.      CHIEF COMPLAINT:  Abdominal pain.      HISTORY OF PRESENT ILLNESS:  This is a 57-year-old gentleman who is otherwise in good health and does not really see doctors much, who comes to the hospital today with concerns of abdominal pain.  The patient mentions that he has been having some intermittent back pain recently, although he attributed it to muscular pain and actually improved.  However, for the last 2 days, he has been having more severe episodes of abdominal pain that is radiating into his back.  He has been having some increased feeling of gas gurgling in the abdomen as well.  The pain is in the lower abdomen and wrapping around to the back, most intense on the left side.  He typically has about 2-3 bowel movements per day, which is his baseline, although his last bowel movement was on Sunday morning.  He was passing gas okay until today and not passing as much now.  He has noticed some occasional straining of blood in the stools, although that is not a routine thing for him.  He denies any recent weight loss, night sweats.  Otherwise, he feels well and denies any chest pain, shortness of breath, urinary symptoms or any melena.      PAST MEDICAL HISTORY:  None.      PAST SURGICAL HISTORY:  The patient denies.      MEDICATIONS:  Does not take any regular prescription medications.      ALLERGIES:  NO KNOWN DRUG ALLERGIES.      FAMILY HISTORY:  Father had prostate cancer.  He has never had a colonoscopy himself.      SOCIAL HISTORY:  Accompanied by his wife to the ER.      REVIEW OF SYSTEMS:  A comprehensive 10-point review of systems was done.  Pertinent findings are in the HPI.  All other systems negative.      PHYSICAL EXAMINATION:   VITAL SIGNS:  Blood pressure is 141/78, heart rate 97, respiratory rate is 18, oxygen 97% on room air, temperature is 98.2.   GENERAL:  Awake, alert, comfortable, lying in the bed in no obvious distress.   ABDOMEN:   Soft.  There is moderate tenderness in the left lower quadrant.  Bowel sounds are very hypoactive.  Mild to moderate distention.  No rebound, guarding or signs of peritonitis noted.   PSYCH: Pleasant, Cooperative, normal affect, no hallucinations   EYES: EOMI, conjunctiva clear  HEENT:  Head is normocephalic, atraumatic, Neck is Supple, trachea is midline   CARDIOVASCULAR: Regular rate and rhythm, Normal S1, S2, no loud murmurs, no rubs or gallops.   PULMONARY:  Clear to auscultation bilaterally with good entry on both sides  CHEST: Good inspiratory effort bilaterally   SKIN:  Dry, No obvious exanthems on exposed areas  EXTREMITIES:  Good capillary refill with signs of adequate peripheral perfusion. No peripheral edema   Neuro: Awake and oriented x 3. No focal weakness or numbness is noted. Moving all extremities with good strength   MSK: Good range of motion in all major joints of upper and lower extremity, No acute joint synovitis of the major joints is noted.         ASSESSMENT AND PLAN:  This is a 57-year-old gentleman with no significant past medical history who does not really see doctors much and has never had a colonoscopy before, who comes to the hospital today with 2-day history of abdominal pain.  His last bowel movement was yesterday morning.  He has taken some laxatives at home to try to relieve that without any significant improvement.  His CT imaging shows concerns for colonic obstruction with a short segment of wall thickening and luminal narrowing in the upper descending colon with associated distention proximal to this.  There were a few tiny lymph nodes in the mesenteric fat adjacent to this thickened colonic segment.     1.  Colonic obstruction.  Strong suspicion for neoplastic disease at this time.  The case has already been discussed with Colorectal Surgery by the ER provider and a formal consultation has been requested.  Await further Colorectal Surgery consultation to ascertain if there is a  need for colonoscopy first to obtain biopsies versus proceeding directly to surgery.  At this time, the patient will be n.p.o., will be on IV fluids.  He is not actively vomiting and hence, will hold off on NG tube placement at this time.  He was advised to let us know if he starts feeling more nauseous or increased distention, so that this can be placed.  He will be offered pain control measures.   2.  Considered other differentials, for example, infectious colitis, stricture. However, his history is not really typical for infectious colitis or inflammatory bowel disease.   3.  Asymptomatic cholelithiasis.   4.  Plan of care has been discussed with the patient and his spouse at bedside in great detail.  All of their questions were answered extensively.  They are comfortable with the plan for admission and agree with it.         DIVINA BAILEY MD             D: 2018   T: 2018   MT: [id number]      Name:     TEDDY CLEARY   MRN:      [id number]        Account:      [id number]   :      1960        Admitted:     2018                   Document: [id number]

## 2018-02-27 NOTE — CONSULTS
[facility]  Colon and Rectal Surgery Consult Note  Name: Gab Holloway    MRN: [id number]  YOB: 1960    Age: 57 year old  Date of admission: 2/26/2018  Primary care provider: No Ref-Primary, Physician     Requesting Physician:  Dr. Sales  Reason for consult:  Colon obstruction, concern for malignancy           History of Present Illness:   Gab Holloway is an otherwise healthy 57 year old male, seen at the request of Dr. Sales, presents with abdominal pain. He describes starting two days ago he had onset of lower back pain that radiated to the front. Then yesterday pain continued to worsen and described as sharp and cramping. He passed stool two days ago and last passed a small amount of gas yesterday. He feels slightly more bloated. He denies associated nausea, vomiting, fever, or chills. Generally he has a bowel movement 2-3 times daily that are firm with only occasional pushing or straining required. He has never had similar symptoms.     Pt remains afebrile, HR 90s-100, normotensive. Labs largely unremarkable without leukocytosis. Abd/pelvis with concerns for colonic obstruction with short segment of wall thickening and luminal narrowing in the upper descending colon with associated distention proximal to this. There are a few tiny lymph nodes in the mesenteric fat adjacent to this thickened colonic segment.     Colonoscopy History:  None, denies FMHx colon cancer, polyps, or IBD.     Surgical History: No previous abdominal surgeries            Past Medical History:   No past medical history on file.          Past Surgical History:   No past surgical history on file.            Social History:     Social History   Substance Use Topics     Smoking status: Not on file     Smokeless tobacco: Not on file     Alcohol use Not on file             Family History:   No family history on file.          Allergies:   No Known Allergies          Medications:       sodium chloride  "(PF)  3 mL Intracatheter Q8H             Review of Systems:   A comprehensive greater than 10 system review of systems was carried out.  Pertinent positives and negatives are noted above.  Otherwise negative for contributory info.            Physical Exam:     Blood pressure 141/78, pulse 97, temperature 98.2  F (36.8  C), temperature source Oral, resp. rate 18, height 1.778 m (5' 10\"), weight 91.2 kg (201 lb 1.6 oz), SpO2 97 %.  No intake or output data in the 24 hours ending 02/27/18 0815  EXAM:  GEN: Awake alert and oriented, appears stated age  PULM: Non-labored breathing with normal respiratory effort  CVS: reg rate and rhythm, no peripheral edema  ABD: Soft, obese, mild left lower quadrant tenderness, mildly distended. No rebound or guarding. No peritoneal signs.   RECTAL: Rectal exam was deferred  NEURO: CN II-XII grossly intact  MSK: extremeties with no clubbing, cyanosis or edema; able to ambulate  PSYCH: responsive, alert, cooperative; oriented x3; appropriate mood and affect  EXT/SKIN: inspection reveals no rashes, lesions or ulcers, normal coloring         Data Reviewed:     Results for orders placed or performed during the hospital encounter of 02/26/18   Abd/pelvis CT no contrast - [facility] Protocol    Narrative    CT ABDOMEN PELVIS W/O CONTRAST  2/26/2018 11:59 PM     INDICATION: Intermittent lower abdominal pain.      TECHNIQUE: Thin axial images through the abdomen and pelvis without  contrast. Coronal reformatted images. Radiation dose for this scan was  reduced using automated exposure control, adjustment of the mA and/or  kV according to patient size, or iterative reconstruction technique.    COMPARISON: None.    FINDINGS: No urinary stones or hydronephrosis. Cholelithiasis. No  pericholecystic inflammation. The upper abdominal organs are otherwise  negative without contrast.    There is a short segment of focal narrowing and bowel wall thickening  involving the upper descending colon over a length " of approximately 4  cm. The colon proximal to this is mildly distended with gas and stool.  The colon below this is decompressed making it difficult to accurately  assess the bowel wall thickness. There are a few tiny lymph nodes in  the mesenteric fat adjacent to the thickened colonic segment. No small  bowel dilatation.    Pelvic structures within normal limits. No ascites or free air. Mild  degenerative changes in the visualized spine.      Impression    IMPRESSION:  1. Short segment of focal colonic wall thickening with luminal  narrowing in the upper descending colon.  2. Mild associated distention of the colon proximal to this consistent  with at least partial/early associated mechanical obstruction.  3. This is most likely neoplasm. Differential includes focal colitis  and/or stricture.  4. Cholelithiasis.    Findings discussed with the ER physician at 12:15 a.m.    AYALA BERNABE MD         Recent Labs  Lab 02/27/18  0545 02/26/18  2320   WBC 10.5 10.8   HGB 14.8 15.8   HCT 44.1 46.1   MCV 99 97    243       Recent Labs  Lab 02/27/18  0545 02/26/18  2320    137   POTASSIUM 3.9 3.6   CHLORIDE 107 104   CO2 25 24   ANIONGAP 7 9   * 115*   BUN 14 16   CR 0.86 0.94   GFRESTIMATED >90 83   GFRESTBLACK >90 >90   JANICE 8.5 9.5   PROTTOTAL  --  8.0   ALBUMIN  --  4.1   BILITOTAL  --  0.7   ALKPHOS  --  75   AST  --  17   ALT  --  15       Recent Labs  Lab 02/26/18  2304   COLOR Yellow   APPEARANCE Clear   URINEGLC Negative   URINEBILI Negative   URINEKETONE 20*   SG 1.027   UBLD Negative   URINEPH 5.0   PROTEIN Negative   NITRITE Negative   LEUKEST Negative   RBCU 2   WBCU <1         Assessment and Plan:   Gab Holloway is an otherwise healthy 57 year old male who presents with abdominal pain.     Pt remains afebrile, HR 90s-100, normotensive. Labs largely unremarkable without leukocytosis. Abd/pelvis with concerns for colonic obstruction with short segment of wall thickening and luminal  narrowing in the upper descending colon with associated distention proximal to this. There are a few tiny lymph nodes in the mesenteric fat adjacent to this thickened colonic segment.      Plan:  1. Admit to hospitalist  2. Surgery: No emergent surgery indicated. Discussed with Dr. Delcid and nurse updated. Will obtain GGE this morning. Pending results will then determine prepping the patient for flexible sigmoidoscopy in the morning with Dr. Delcid.Continue conservative measures with bowel rest, IVF, and consider NG tube placement if pt develops worsening nausea or vomiting. Plan for flex sig in the morning, will not prep given recent GGE.    Plan for laparoscopic possible open left hemicolectomy with likely  colostomy to be scheduled tomorrow or Thursday.   3. Diet: NPO  4. IV Fluids: as ordered  5. Antibiotics:  Not indicated   6. Medications:  Continue home  meds  7. I&O s:  strict I&O s  8. Labs:   - Reviewed: by myself   9. Imaging:   - Dr. Delcid and myself have personally viewed: CT abd/pelvis  - Ordered: GGE  10. Activity: ambulate as tolerated, encourage OOB  11. DVT prophylaxis: SCD s  12. This plan has been discussed with Dr. eDlcid    Patient specific identified risk factors considered as part of today s evaluation include: No previous colonoscopy    Additional history obtained from chart review.  Time spent on consultation: 40minutes, greater than 50 percent of the total encounter time is spent in counseling and/or coordination of care          Christina Gordon PA-C  [facility]  Phone:  [phone]

## 2018-02-27 NOTE — PLAN OF CARE
ROOM # 208-2    Living Situation (if not independent, order SW consult): Home with wife   Facility name:  : [facility] [phone] home     Activity level at baseline: Ind   Activity level on admit: Ind       Patient registered to observation; given Patient Bill of Rights; given the opportunity to ask questions about observation status and their plan of care.  Patient has been oriented to the observation room, bathroom and call light is in place.    Discussed discharge goals and expectations with patient/family.

## 2018-02-27 NOTE — ED NOTES
Pt presents with abdominal pain that radiates into his back denies nausea vomiting. Denies urinary symptoms. Pt c/o constipation since yesterday, attempted magnesium citrate at home with no results. Pt alert, oriented x3. ABCs intact

## 2018-02-28 LAB
ANION GAP SERPL CALCULATED.3IONS-SCNC: 6 MMOL/L (ref 3–14)
BUN SERPL-MCNC: 14 MG/DL (ref 7–30)
CALCIUM SERPL-MCNC: 8.4 MG/DL (ref 8.5–10.1)
CEA SERPL-MCNC: <0.5 UG/L (ref 0–2.5)
CHLORIDE SERPL-SCNC: 106 MMOL/L (ref 94–109)
CO2 SERPL-SCNC: 26 MMOL/L (ref 20–32)
CREAT SERPL-MCNC: 0.89 MG/DL (ref 0.66–1.25)
FLEXIBLE SIGMOIDOSCOPY: NORMAL
GFR SERPL CREATININE-BSD FRML MDRD: 88 ML/MIN/1.7M2
GLUCOSE SERPL-MCNC: 124 MG/DL (ref 70–99)
INR PPP: 1.05 (ref 0.86–1.14)
POTASSIUM SERPL-SCNC: 4 MMOL/L (ref 3.4–5.3)
SODIUM SERPL-SCNC: 138 MMOL/L (ref 133–144)

## 2018-02-28 PROCEDURE — 88305 TISSUE EXAM BY PATHOLOGIST: CPT | Performed by: COLON & RECTAL SURGERY

## 2018-02-28 PROCEDURE — 99232 SBSQ HOSP IP/OBS MODERATE 35: CPT | Performed by: HOSPITALIST

## 2018-02-28 PROCEDURE — 25000128 H RX IP 250 OP 636: Performed by: COLON & RECTAL SURGERY

## 2018-02-28 PROCEDURE — 88342 IMHCHEM/IMCYTCHM 1ST ANTB: CPT | Mod: 26 | Performed by: COLON & RECTAL SURGERY

## 2018-02-28 PROCEDURE — 85610 PROTHROMBIN TIME: CPT | Performed by: HOSPITALIST

## 2018-02-28 PROCEDURE — 40000905 ZZH STATISTIC WOC PT EDUCATION, > 60 MIN

## 2018-02-28 PROCEDURE — 25000128 H RX IP 250 OP 636: Performed by: PHYSICIAN ASSISTANT

## 2018-02-28 PROCEDURE — 82378 CARCINOEMBRYONIC ANTIGEN: CPT | Performed by: HOSPITALIST

## 2018-02-28 PROCEDURE — 36415 COLL VENOUS BLD VENIPUNCTURE: CPT | Performed by: HOSPITALIST

## 2018-02-28 PROCEDURE — 80048 BASIC METABOLIC PNL TOTAL CA: CPT | Performed by: HOSPITALIST

## 2018-02-28 PROCEDURE — 25000128 H RX IP 250 OP 636: Performed by: HOSPITALIST

## 2018-02-28 PROCEDURE — 88305 TISSUE EXAM BY PATHOLOGIST: CPT | Mod: 26 | Performed by: COLON & RECTAL SURGERY

## 2018-02-28 PROCEDURE — 0DBN8ZX EXCISION OF SIGMOID COLON, VIA NATURAL OR ARTIFICIAL OPENING ENDOSCOPIC, DIAGNOSTIC: ICD-10-PCS | Performed by: COLON & RECTAL SURGERY

## 2018-02-28 PROCEDURE — 88341 IMHCHEM/IMCYTCHM EA ADD ANTB: CPT | Performed by: COLON & RECTAL SURGERY

## 2018-02-28 PROCEDURE — 45331 SIGMOIDOSCOPY AND BIOPSY: CPT | Performed by: COLON & RECTAL SURGERY

## 2018-02-28 PROCEDURE — 99207 ZZC CDG-MDM COMPONENT: MEETS LOW - DOWN CODED: CPT | Performed by: HOSPITALIST

## 2018-02-28 PROCEDURE — 45335 SIGMOIDOSCOPY W/SUBMUC INJ: CPT | Performed by: COLON & RECTAL SURGERY

## 2018-02-28 PROCEDURE — 25000128 H RX IP 250 OP 636: Performed by: INTERNAL MEDICINE

## 2018-02-28 PROCEDURE — 88341 IMHCHEM/IMCYTCHM EA ADD ANTB: CPT | Mod: 26 | Performed by: COLON & RECTAL SURGERY

## 2018-02-28 PROCEDURE — 12000000 ZZH R&B MED SURG/OB

## 2018-02-28 PROCEDURE — 25000125 ZZHC RX 250: Performed by: PHYSICIAN ASSISTANT

## 2018-02-28 PROCEDURE — 25000125 ZZHC RX 250: Performed by: COLON & RECTAL SURGERY

## 2018-02-28 PROCEDURE — 88342 IMHCHEM/IMCYTCHM 1ST ANTB: CPT | Performed by: COLON & RECTAL SURGERY

## 2018-02-28 PROCEDURE — G0500 MOD SEDAT ENDO SERVICE >5YRS: HCPCS | Performed by: COLON & RECTAL SURGERY

## 2018-02-28 RX ORDER — FLUMAZENIL 0.1 MG/ML
0.2 INJECTION, SOLUTION INTRAVENOUS
Status: ACTIVE | OUTPATIENT
Start: 2018-02-28 | End: 2018-02-28

## 2018-02-28 RX ORDER — FENTANYL CITRATE 50 UG/ML
INJECTION, SOLUTION INTRAMUSCULAR; INTRAVENOUS PRN
Status: DISCONTINUED | OUTPATIENT
Start: 2018-02-28 | End: 2018-02-28 | Stop reason: HOSPADM

## 2018-02-28 RX ORDER — SODIUM CHLORIDE, SODIUM LACTATE, POTASSIUM CHLORIDE, CALCIUM CHLORIDE 600; 310; 30; 20 MG/100ML; MG/100ML; MG/100ML; MG/100ML
INJECTION, SOLUTION INTRAVENOUS CONTINUOUS
Status: DISCONTINUED | OUTPATIENT
Start: 2018-02-28 | End: 2018-03-02

## 2018-02-28 RX ORDER — NALOXONE HYDROCHLORIDE 0.4 MG/ML
.1-.4 INJECTION, SOLUTION INTRAMUSCULAR; INTRAVENOUS; SUBCUTANEOUS
Status: ACTIVE | OUTPATIENT
Start: 2018-02-28 | End: 2018-03-01

## 2018-02-28 RX ADMIN — Medication 0.5 MG: at 22:41

## 2018-02-28 RX ADMIN — SODIUM CHLORIDE, POTASSIUM CHLORIDE, SODIUM LACTATE AND CALCIUM CHLORIDE: 600; 310; 30; 20 INJECTION, SOLUTION INTRAVENOUS at 21:18

## 2018-02-28 RX ADMIN — LORAZEPAM 0.5 MG: 2 INJECTION INTRAMUSCULAR; INTRAVENOUS at 03:46

## 2018-02-28 RX ADMIN — SODIUM CHLORIDE: 9 INJECTION, SOLUTION INTRAVENOUS at 03:49

## 2018-02-28 RX ADMIN — Medication 0.5 MG: at 16:29

## 2018-02-28 RX ADMIN — PANTOPRAZOLE SODIUM 40 MG: 40 INJECTION, POWDER, FOR SOLUTION INTRAVENOUS at 12:34

## 2018-02-28 RX ADMIN — Medication 0.5 MG: at 12:35

## 2018-02-28 RX ADMIN — SODIUM CHLORIDE, POTASSIUM CHLORIDE, SODIUM LACTATE AND CALCIUM CHLORIDE: 600; 310; 30; 20 INJECTION, SOLUTION INTRAVENOUS at 11:27

## 2018-02-28 RX ADMIN — Medication 0.5 MG: at 18:57

## 2018-02-28 RX ADMIN — Medication 0.5 MG: at 05:45

## 2018-02-28 ASSESSMENT — ACTIVITIES OF DAILY LIVING (ADL)
COMMUNICATION: 0 - UNDERSTANDS/COMMUNICATES WITHOUT DIFFICULTY
ADLS_ACUITY_SCORE: 9
AMBULATION: 0-->INDEPENDENT
FALL_HISTORY_WITHIN_LAST_SIX_MONTHS: NO
BATHING: 0 - INDEPENDENT
EATING: 0 - INDEPENDENT
DRESS: 0 - INDEPENDENT
RETIRED_EATING: 0-->INDEPENDENT
COGNITION: 0 - NO COGNITION ISSUES REPORTED
TOILETING: 0-->INDEPENDENT
SWALLOWING: 0 - SWALLOWS FOODS/LIQUIDS WITHOUT DIFFICULTY
TRANSFERRING: 0-->INDEPENDENT
TRANSFERRING: 0 - INDEPENDENT
SWALLOWING: 0-->SWALLOWS FOODS/LIQUIDS WITHOUT DIFFICULTY
BATHING: 0-->INDEPENDENT
DRESS: 0-->INDEPENDENT
AMBULATION: 0 - INDEPENDENT
RETIRED_COMMUNICATION: 0-->UNDERSTANDS/COMMUNICATES WITHOUT DIFFICULTY
TOILETING: 0 - INDEPENDENT

## 2018-02-28 NOTE — PROGRESS NOTES
Pt had gastrogaffin enema today. Will have Ct with contrast later this evening. Pt denies pain at this time, except for what he describes as gas pain. Pt scheduled for flex sig tomorrow morning at 735, needs 2 tap water enemas prior.

## 2018-02-28 NOTE — OP NOTE
See Provation Note In Chart  Obstructing mass in the descending colon.   Plan for surgery at 1 pm tomorrow.       Joann Delcid MD  [facility]  Office Phone # [phone]

## 2018-02-28 NOTE — PROGRESS NOTES
"[facility] Nurse Ostomy Marking and Education         Data:   History:    57 year old healthy male who was admitted on 2/26/2018 with abdominal pain/bloating. Found to have colonic mass with partial/early obstruction suspicious for malignancy.     Pt referred by Dr. Delcid  Who is present for marking and education: pt and spouse  Type of ostomy surgery:  Colostomy  Abdomen:  Large and round barrel like, firm           Intervention:     Patient's chart evaluated.      Assessments done today:  Pt observed lying, sitting and standing.     Education: Reviewed upcoming surgery, stoma construction,post-op expectations, general ostomy cares/concerns including: differenced between colostomy/ileostomy, diet, bathing, odor, output,  activity, appliances and emptying.  Demonstration on pouch change with pt and spouse, all aspects of ostomy skin care, products and all questions answered.     Patient marked with \"x\" using surgical marker and Pt marked Left upper quad.          Assessment:       Learning needs/ comprehension: pt is \"ready\" for surgery but admits to being slightly anxious. He has no prior experience but both pt and spouse were very engaged and discussed at length ostomy care; asked appropriate questions. Pt is a  does lift some heavy objects thru the day.          Plan:     Plan:   ? Surgery scheduled for:  3/1 at 1300    Will plan to follow patient post operatively.            "

## 2018-02-28 NOTE — PROGRESS NOTES
Patient was still not able to settle down after getting anti emetic and pain medication.  Had PA change ativan to IV instead of po due to patient stating that he did not think that he could take anything pill without more stomach upset.  Was given 0.5 mg of ativan and with recheck it appears patient is now resting, wife gave thumbs up with a smile.  Will continue to monitor and assess.

## 2018-02-28 NOTE — PLAN OF CARE
Problem: Patient Care Overview  Goal: Plan of Care/Patient Progress Review  Outcome: No Change  Problem: Patient Care Overview  Goal: relief of symptoms, identify cause and treat  Outcome: comfort and normal bowel motility as is possible for patient's diagnosis  Goal: lessen discomfort   [place]: A/O times 4       VSS: rosalind  LS: clear  Cardiac: wnl  GI: npo, has nausea/emesis responding to anti emetics, abdomen is distended, abdominal cramping  : wnl  Skin: wnl  Activity: ind  Diet: npo  Pain: relief with dilaudid  Plan: to have a flex sigmoidoscopy this am.

## 2018-02-28 NOTE — PLAN OF CARE
Problem: Patient Care Overview  Goal: Plan of Care/Patient Progress Review  Outcome: No Change  PRIMARY DIAGNOSIS: ACUTE ABDOMINAL PAIN  OUTPATIENT/OBSERVATION GOALS TO BE MET BEFORE DISCHARGE:  1. Pain Status: Improved but still requiring IV narcotics, 0.5 mg dilaudid given at     2. Return to near baseline physical activity: Yes, ambulates with SBA    3. Cleared for discharge by consultants (if involved): No, colorectal surgery    Discharge Planner Nurse   Safe discharge environment identified: Yes  Barriers to discharge: Yes, surgery       Entered by: Gale Chaudhry 02/28/2018 1:06 PM      B/P: 123/75, T: 96.7, P: 97, R: 16    A&Ox4. Vitals stable. Denies chest pain or SOB. Bowel sounds hypoactive. Complains of 3/10 lower abd pain that radiates to his back after ambulating. Denies nausea. IV dilaudid 0.5mg given at 1235. Complained of heartburn. IV protonix was held at 0800, dose was given at 1234 per MD for the heartburn. Pt had flex sig this morning. Strict NPO for surgery tomorrow. LR at 100 infusing. Ambulating with SBA. Wife at bedside. Will continue to monitor and provide comfort care.    Please review provider order for any additional goals.   Nurse to notify provider when observation goals have been met and patient is ready for discharge.

## 2018-02-28 NOTE — OR NURSING
Pt tolerated flex sig with biopsies of mass and tattoo well. Plan for surgery tomorrow. Report called to station nurse. Wife to come down. Will stay in recovery about 30 min then go back to station.

## 2018-02-28 NOTE — PROGRESS NOTES
Pt returned to room from procedure. Ambulated to bed with SBA. Gait steady. Denies pain and nausea at this time. Strict NPO for surgery tomorrow. Pt wanting to take a nap and then ambulate in the hallway. Will continue to monitor and provide comfort care.

## 2018-02-28 NOTE — PLAN OF CARE
Problem: Patient Care Overview  Goal: Plan of Care/Patient Progress Review  Outcome: No Change  Goal: relief of symptoms, identify cause and treat  Outcome: comfort and normal bowel motility as is possible for patient's diagnosis  Goal: lessen discomfort   [place]: A/O times 4       VSS: rosalind  LS: clear  Cardiac: wnl  GI: npo, has nausea/emesis responding to anti emetics, abdomen is distended, abdominal cramping  : wnl  Skin: wnl  Activity: ind  Diet: npo  Pain: relief with dilaudid  Plan: to have a flex sigmoidoscopy this am.

## 2018-02-28 NOTE — PROGRESS NOTES
[facility]    Hospitalist Progress Note    Date of Service (when I saw the patient): 02/28/2018    Assessment & Plan   Gab Holloway is a 57 year old healthy male who was admitted on 2/26/2018 with abdominal pain/bloating. Found to have colonic mass with partial/early obstruction suspicious for malignancy    1. Neuro- pain control- on IV meds. Needing some ativan for some anxiety surrounding current diagnosis  2. CVS- no issues  3. Pulm- no issues  4. GI- NPO. Colonic mass. Being seen by Colorectal. Had GGE yesterday.Sigmoidoscopy done today, likely malignant obstructing mass. Surgery tomorrow.  5. ID- no issues  6. Renal- on IVF while NPO  7. Heme/onc- will likely need Onc involved once a tissue diagnosis is made  8. Endo- no issues  9. Musculoskel- ambulate- encouraged  10. Prophylaxis- will likely need to start if has prolonged hospital stay. Should ambulate now  11. Dispo- unclear. Home after recovery from surgery  12. Wife in room- updated  13. No labs needed tomorrow    Code Status: Full Code    Disposition: Expected discharge in unclear  Roman Stuart    Interval History   Patient feeling ok today. Pain controlled. No chest pain, sob, n/v/d. Had some nausea last night.    -Data reviewed today: I reviewed all new labs and imaging results over the last 24 hours. I personally reviewed the abdominal CT image(s) showing colonic mass.    Physical Exam   Temp: 98.9  F (37.2  C) Temp src: Oral BP: 111/67 Pulse: 97 Heart Rate: 99 Resp: 16 SpO2: 92 % O2 Device: None (Room air) Oxygen Delivery: 2 LPM  Vitals:    02/27/18 0208   Weight: 91.2 kg (201 lb 1.6 oz)     Vital Signs with Ranges  Temp:  [96.5  F (35.8  C)-99.5  F (37.5  C)] 98.9  F (37.2  C)  Pulse:  [] 97  Heart Rate:  [] 99  Resp:  [12-20] 16  BP: (103-147)/(55-92) 111/67  SpO2:  [91 %-98 %] 92 %  I/O last 3 completed shifts:  In: 1477 [P.O.:240; I.V.:1237]  Out: 200 [Emesis/NG output:200]    Constitutional: Awake, alert,  cooperative, no apparent distress   Respiratory: Clear to auscultation bilaterally, no crackles or wheezing   Cardiovascular: Regular rate and rhythm, normal S1 and S2, and no murmur noted   Abdomen: Decreased bs, distended. nt   Skin: No rashes, no cyanosis, dry to touch   Neuro: Alert and oriented x3, no weakness, numbness, memory loss   Extremities: No edema, normal range of motion   Other(s):        All other systems: Negative     Medications     - MEDICATION INSTRUCTIONS -       lactated ringers         sodium chloride (PF)  3 mL Intracatheter Q8H     pantoprazole (PROTONIX) IV  40 mg Intravenous Daily with breakfast       Data     Recent Labs  Lab 02/28/18  0636 02/27/18  0545 02/26/18  2320   WBC  --  10.5 10.8   HGB  --  14.8 15.8   MCV  --  99 97   PLT  --  212 243   INR 1.05  --   --     139 137   POTASSIUM 4.0 3.9 3.6   CHLORIDE 106 107 104   CO2 26 25 24   BUN 14 14 16   CR 0.89 0.86 0.94   ANIONGAP 6 7 9   JANICE 8.4* 8.5 9.5   * 121* 115*   ALBUMIN  --   --  4.1   PROTTOTAL  --   --  8.0   BILITOTAL  --   --  0.7   ALKPHOS  --   --  75   ALT  --   --  15   AST  --   --  17   LIPASE  --   --  122       Recent Results (from the past 24 hour(s))   CT Chest/Abdomen/Pelvis w Contrast    Narrative    CT CHEST, ABDOMEN, AND PELVIS WITH CONTRAST  2/27/2018 7:06 PM    HISTORY: Upper descending colon mass, suggestive of neoplasm.     COMPARISON: A CT of the abdomen and pelvis without contrast on  2/26/2018.    TECHNIQUE: Routine transverse CT imaging of the chest, abdomen, and  pelvis was performed following the uneventful administration of 100mL  Isovue-370 intravenous contrast. Radiation dose for this scan was  reduced using automated exposure control, adjustment of the mA and/or  kV according to patient size, or iterative reconstruction technique.    FINDINGS:   Chest: The heart size is normal. No enlarged lymph node or other  abnormal mediastinal mass is seen. The thoracic aorta and  pulmonary  arteries are unremarkable. The lungs are clear. No pneumothorax is  demonstrated. No pleural effusion is identified. There are  degenerative changes in the spine. No other osseous abnormality is  demonstrated. No chest wall pathology is seen.     Abdomen and pelvis: The liver, spleen, and pancreas are normal. Again  seen are several small dependent gallstones. The adrenal glands,  kidneys, and bladder are normal. No enlarged lymph node or other  abnormal mass is demonstrated. No free fluid is seen. No free  intraperitoneal gas is identified. Again seen is a short segment of  prominent narrowing and bowel wall thickening along the superior  aspect of the descending colon. This area measures approximately 4.0  cm in length. There is moderate distention of the colon proximal to  this. No other gastrointestinal tract abnormality is identified. The  appendix is not identified. There is no additional evidence of  appendicitis. No vascular abnormality is seen. There are degenerative  changes in the spine. No other osseous abnormality is seen. No  abdominal or pelvic wall pathology is demonstrated.       Impression    IMPRESSION:   1. Again seen is a short segment of prominent bowel wall thickening  and luminal narrowing within the superior aspect of the descending  colon. This is again highly suggestive of a neoplasm.  2. Cholelithiasis.  3. Otherwise unremarkable examination. Specifically, I see no evidence  of metastatic disease.     MELISSA PYLE MD

## 2018-02-28 NOTE — PROGRESS NOTES
Two 1500 cc water enemas given slowly.  Patient tolerated both enemas well.  Did have some pain and nausea.  Had large brown colored emesis when in bathroom.  Had small returned with a lot of gas passed.  Abdomen is still distended.  Wife is present.  Consent on chart with passport and extra labels.  Patient being sent to procedure via cart.  Wife stated that she wanted to stay in room.  IV locked.

## 2018-02-28 NOTE — PLAN OF CARE
Problem: Patient Care Overview  Goal: Plan of Care/Patient Progress Review  Outcome: No Change  PRIMARY DIAGNOSIS: Acute abdominal pain  OUTPATIENT/OBSERVATION GOALS TO BE MET BEFORE DISCHARGE:  1. Pain Status: continues to have a lot of abdominal cramping, sitting at bedside and when stood up had a 200 cc emesis.  Stated that the CT contrast that he drank made him ill.  Stated he felt better after emesis.       2. Return to near baseline physical activity: Yes     3. Cleared for discharge by consultants (if involved): No.  Is NPO at this time, to have a flex sig in am.  Receiving  medication for nausea.  IV fluids infusing.  His spouse is at bedside.  Will continue to monitor and assess.      Discharge Planner Nurse   Safe discharge environment identified: Yes  Barriers to discharge: Yes       Entered by: Nika Rincon 02/27/2018 5:20 AM      A & O x4, VSS. C/o sharp 10/10 pain to lower abdomen around to back. Prn acetaminophen and iv dilaudid given. One time ativan ordered given as well. Denies SOB or nausea. BS hypoactive x4. Pt NPO, up independent in room. Wife at bedside. IVF infusing. Plan for colorectal surgery consult in the AM. Will continue to monitor.   Please review provider order for any additional goals.   Nurse to notify provider when observation goals have been met and patient is ready for discharge.

## 2018-03-01 ENCOUNTER — ANESTHESIA EVENT (OUTPATIENT)
Dept: SURGERY | Facility: CLINIC | Age: 58
DRG: 329 | End: 2018-03-01
Payer: COMMERCIAL

## 2018-03-01 ENCOUNTER — ANESTHESIA (OUTPATIENT)
Dept: SURGERY | Facility: CLINIC | Age: 58
DRG: 329 | End: 2018-03-01
Payer: COMMERCIAL

## 2018-03-01 PROBLEM — C18.9 COLON CANCER (H): Status: ACTIVE | Noted: 2018-03-01

## 2018-03-01 LAB
ANION GAP SERPL CALCULATED.3IONS-SCNC: 7 MMOL/L (ref 3–14)
BUN SERPL-MCNC: 12 MG/DL (ref 7–30)
CALCIUM SERPL-MCNC: 7.6 MG/DL (ref 8.5–10.1)
CHLORIDE SERPL-SCNC: 106 MMOL/L (ref 94–109)
CO2 SERPL-SCNC: 23 MMOL/L (ref 20–32)
CREAT SERPL-MCNC: 0.74 MG/DL (ref 0.66–1.25)
GFR SERPL CREATININE-BSD FRML MDRD: >90 ML/MIN/1.7M2
GLUCOSE SERPL-MCNC: 135 MG/DL (ref 70–99)
GRAM STN SPEC: NORMAL
GRAM STN SPEC: NORMAL
HGB BLD-MCNC: 16.6 G/DL (ref 13.3–17.7)
POTASSIUM SERPL-SCNC: 4.7 MMOL/L (ref 3.4–5.3)
SODIUM SERPL-SCNC: 136 MMOL/L (ref 133–144)
SPECIMEN SOURCE: NORMAL

## 2018-03-01 PROCEDURE — 87181 SC STD AGAR DILUTION PER AGT: CPT | Performed by: COLON & RECTAL SURGERY

## 2018-03-01 PROCEDURE — 87070 CULTURE OTHR SPECIMN AEROBIC: CPT | Performed by: COLON & RECTAL SURGERY

## 2018-03-01 PROCEDURE — 99207 ZZC NON-BILLABLE SERV PER CHARTING: CPT | Performed by: HOSPITALIST

## 2018-03-01 PROCEDURE — 0D1L0Z4 BYPASS TRANSVERSE COLON TO CUTANEOUS, OPEN APPROACH: ICD-10-PCS | Performed by: COLON & RECTAL SURGERY

## 2018-03-01 PROCEDURE — 25000125 ZZHC RX 250: Performed by: NURSE ANESTHETIST, CERTIFIED REGISTERED

## 2018-03-01 PROCEDURE — 88309 TISSUE EXAM BY PATHOLOGIST: CPT | Performed by: COLON & RECTAL SURGERY

## 2018-03-01 PROCEDURE — 40000306 ZZH STATISTIC PRE PROC ASSESS II: Performed by: COLON & RECTAL SURGERY

## 2018-03-01 PROCEDURE — 25000566 ZZH SEVOFLURANE, EA 15 MIN: Performed by: COLON & RECTAL SURGERY

## 2018-03-01 PROCEDURE — 36415 COLL VENOUS BLD VENIPUNCTURE: CPT | Performed by: COLON & RECTAL SURGERY

## 2018-03-01 PROCEDURE — 37000008 ZZH ANESTHESIA TECHNICAL FEE, 1ST 30 MIN: Performed by: COLON & RECTAL SURGERY

## 2018-03-01 PROCEDURE — 12000000 ZZH R&B MED SURG/OB

## 2018-03-01 PROCEDURE — 25000125 ZZHC RX 250: Performed by: PHYSICIAN ASSISTANT

## 2018-03-01 PROCEDURE — 25000125 ZZHC RX 250: Performed by: COLON & RECTAL SURGERY

## 2018-03-01 PROCEDURE — 25000128 H RX IP 250 OP 636: Performed by: COLON & RECTAL SURGERY

## 2018-03-01 PROCEDURE — 0DTG0ZZ RESECTION OF LEFT LARGE INTESTINE, OPEN APPROACH: ICD-10-PCS | Performed by: COLON & RECTAL SURGERY

## 2018-03-01 PROCEDURE — 37000009 ZZH ANESTHESIA TECHNICAL FEE, EACH ADDTL 15 MIN: Performed by: COLON & RECTAL SURGERY

## 2018-03-01 PROCEDURE — 40000902 ZZH STATISTIC WOC PT EDUCATION, 16-30 MIN

## 2018-03-01 PROCEDURE — 25000128 H RX IP 250 OP 636: Performed by: NURSE ANESTHETIST, CERTIFIED REGISTERED

## 2018-03-01 PROCEDURE — 36000093 ZZH SURGERY LEVEL 4 1ST 30 MIN: Performed by: COLON & RECTAL SURGERY

## 2018-03-01 PROCEDURE — 87205 SMEAR GRAM STAIN: CPT | Performed by: COLON & RECTAL SURGERY

## 2018-03-01 PROCEDURE — 87077 CULTURE AEROBIC IDENTIFY: CPT | Performed by: COLON & RECTAL SURGERY

## 2018-03-01 PROCEDURE — 80048 BASIC METABOLIC PNL TOTAL CA: CPT | Performed by: COLON & RECTAL SURGERY

## 2018-03-01 PROCEDURE — 87076 CULTURE ANAEROBE IDENT EACH: CPT | Performed by: COLON & RECTAL SURGERY

## 2018-03-01 PROCEDURE — 87075 CULTR BACTERIA EXCEPT BLOOD: CPT | Performed by: COLON & RECTAL SURGERY

## 2018-03-01 PROCEDURE — 71000012 ZZH RECOVERY PHASE 1 LEVEL 1 FIRST HR: Performed by: COLON & RECTAL SURGERY

## 2018-03-01 PROCEDURE — 27210995 ZZH RX 272: Performed by: COLON & RECTAL SURGERY

## 2018-03-01 PROCEDURE — 25000128 H RX IP 250 OP 636: Performed by: ANESTHESIOLOGY

## 2018-03-01 PROCEDURE — 85018 HEMOGLOBIN: CPT | Performed by: COLON & RECTAL SURGERY

## 2018-03-01 PROCEDURE — 88309 TISSUE EXAM BY PATHOLOGIST: CPT | Mod: 26 | Performed by: COLON & RECTAL SURGERY

## 2018-03-01 PROCEDURE — 25000128 H RX IP 250 OP 636: Performed by: INTERNAL MEDICINE

## 2018-03-01 PROCEDURE — 25000128 H RX IP 250 OP 636: Performed by: HOSPITALIST

## 2018-03-01 PROCEDURE — 27210794 ZZH OR GENERAL SUPPLY STERILE: Performed by: COLON & RECTAL SURGERY

## 2018-03-01 PROCEDURE — 36000063 ZZH SURGERY LEVEL 4 EA 15 ADDTL MIN: Performed by: COLON & RECTAL SURGERY

## 2018-03-01 RX ORDER — LIDOCAINE HYDROCHLORIDE 10 MG/ML
INJECTION, SOLUTION INFILTRATION; PERINEURAL PRN
Status: DISCONTINUED | OUTPATIENT
Start: 2018-03-01 | End: 2018-03-01

## 2018-03-01 RX ORDER — SODIUM CHLORIDE, SODIUM LACTATE, POTASSIUM CHLORIDE, CALCIUM CHLORIDE 600; 310; 30; 20 MG/100ML; MG/100ML; MG/100ML; MG/100ML
INJECTION, SOLUTION INTRAVENOUS CONTINUOUS
Status: DISCONTINUED | OUTPATIENT
Start: 2018-03-01 | End: 2018-03-01 | Stop reason: HOSPADM

## 2018-03-01 RX ORDER — DEXAMETHASONE SODIUM PHOSPHATE 4 MG/ML
INJECTION, SOLUTION INTRA-ARTICULAR; INTRALESIONAL; INTRAMUSCULAR; INTRAVENOUS; SOFT TISSUE PRN
Status: DISCONTINUED | OUTPATIENT
Start: 2018-03-01 | End: 2018-03-01

## 2018-03-01 RX ORDER — BUPIVACAINE HYDROCHLORIDE AND EPINEPHRINE 2.5; 5 MG/ML; UG/ML
INJECTION, SOLUTION EPIDURAL; INFILTRATION; INTRACAUDAL; PERINEURAL PRN
Status: DISCONTINUED | OUTPATIENT
Start: 2018-03-01 | End: 2018-03-01 | Stop reason: HOSPADM

## 2018-03-01 RX ORDER — FENTANYL CITRATE 50 UG/ML
INJECTION, SOLUTION INTRAMUSCULAR; INTRAVENOUS PRN
Status: DISCONTINUED | OUTPATIENT
Start: 2018-03-01 | End: 2018-03-01

## 2018-03-01 RX ORDER — LABETALOL HYDROCHLORIDE 5 MG/ML
10 INJECTION, SOLUTION INTRAVENOUS EVERY 5 MIN PRN
Status: DISCONTINUED | OUTPATIENT
Start: 2018-03-01 | End: 2018-03-01 | Stop reason: HOSPADM

## 2018-03-01 RX ORDER — ONDANSETRON 4 MG/1
4 TABLET, ORALLY DISINTEGRATING ORAL EVERY 30 MIN PRN
Status: DISCONTINUED | OUTPATIENT
Start: 2018-03-01 | End: 2018-03-01 | Stop reason: HOSPADM

## 2018-03-01 RX ORDER — CIPROFLOXACIN 2 MG/ML
400 INJECTION, SOLUTION INTRAVENOUS
Status: COMPLETED | OUTPATIENT
Start: 2018-03-01 | End: 2018-03-01

## 2018-03-01 RX ORDER — ACETAMINOPHEN 10 MG/ML
1000 INJECTION, SOLUTION INTRAVENOUS EVERY 6 HOURS
Status: DISCONTINUED | OUTPATIENT
Start: 2018-03-01 | End: 2018-03-05

## 2018-03-01 RX ORDER — NALOXONE HYDROCHLORIDE 0.4 MG/ML
.1-.4 INJECTION, SOLUTION INTRAMUSCULAR; INTRAVENOUS; SUBCUTANEOUS
Status: DISCONTINUED | OUTPATIENT
Start: 2018-03-01 | End: 2018-03-01

## 2018-03-01 RX ORDER — LIDOCAINE 40 MG/G
CREAM TOPICAL
Status: DISCONTINUED | OUTPATIENT
Start: 2018-03-01 | End: 2018-03-01 | Stop reason: HOSPADM

## 2018-03-01 RX ORDER — HEPARIN SODIUM 5000 [USP'U]/.5ML
5000 INJECTION, SOLUTION INTRAVENOUS; SUBCUTANEOUS
Status: COMPLETED | OUTPATIENT
Start: 2018-03-01 | End: 2018-03-01

## 2018-03-01 RX ORDER — GLYCOPYRROLATE 0.2 MG/ML
INJECTION, SOLUTION INTRAMUSCULAR; INTRAVENOUS PRN
Status: DISCONTINUED | OUTPATIENT
Start: 2018-03-01 | End: 2018-03-01

## 2018-03-01 RX ORDER — NEOSTIGMINE METHYLSULFATE 1 MG/ML
VIAL (ML) INJECTION PRN
Status: DISCONTINUED | OUTPATIENT
Start: 2018-03-01 | End: 2018-03-01

## 2018-03-01 RX ORDER — ONDANSETRON 2 MG/ML
4 INJECTION INTRAMUSCULAR; INTRAVENOUS EVERY 6 HOURS PRN
Status: DISCONTINUED | OUTPATIENT
Start: 2018-03-01 | End: 2018-03-14 | Stop reason: HOSPADM

## 2018-03-01 RX ORDER — SODIUM CHLORIDE, SODIUM LACTATE, POTASSIUM CHLORIDE, CALCIUM CHLORIDE 600; 310; 30; 20 MG/100ML; MG/100ML; MG/100ML; MG/100ML
INJECTION, SOLUTION INTRAVENOUS CONTINUOUS
Status: DISCONTINUED | OUTPATIENT
Start: 2018-03-01 | End: 2018-03-06

## 2018-03-01 RX ORDER — CIPROFLOXACIN 2 MG/ML
400 INJECTION, SOLUTION INTRAVENOUS EVERY 12 HOURS
Status: COMPLETED | OUTPATIENT
Start: 2018-03-02 | End: 2018-03-02

## 2018-03-01 RX ORDER — CIPROFLOXACIN 2 MG/ML
400 INJECTION, SOLUTION INTRAVENOUS SEE ADMIN INSTRUCTIONS
Status: DISCONTINUED | OUTPATIENT
Start: 2018-03-01 | End: 2018-03-01 | Stop reason: HOSPADM

## 2018-03-01 RX ORDER — MAGNESIUM HYDROXIDE 1200 MG/15ML
LIQUID ORAL PRN
Status: DISCONTINUED | OUTPATIENT
Start: 2018-03-01 | End: 2018-03-01 | Stop reason: HOSPADM

## 2018-03-01 RX ORDER — ONDANSETRON 2 MG/ML
4 INJECTION INTRAMUSCULAR; INTRAVENOUS EVERY 30 MIN PRN
Status: DISCONTINUED | OUTPATIENT
Start: 2018-03-01 | End: 2018-03-01 | Stop reason: HOSPADM

## 2018-03-01 RX ORDER — LIDOCAINE 40 MG/G
CREAM TOPICAL
Status: DISCONTINUED | OUTPATIENT
Start: 2018-03-01 | End: 2018-03-06

## 2018-03-01 RX ORDER — FENTANYL CITRATE 50 UG/ML
25-50 INJECTION, SOLUTION INTRAMUSCULAR; INTRAVENOUS
Status: DISCONTINUED | OUTPATIENT
Start: 2018-03-01 | End: 2018-03-01 | Stop reason: HOSPADM

## 2018-03-01 RX ORDER — NALOXONE HYDROCHLORIDE 0.4 MG/ML
.1-.4 INJECTION, SOLUTION INTRAMUSCULAR; INTRAVENOUS; SUBCUTANEOUS
Status: DISCONTINUED | OUTPATIENT
Start: 2018-03-01 | End: 2018-03-14 | Stop reason: HOSPADM

## 2018-03-01 RX ORDER — BUPIVACAINE HYDROCHLORIDE AND EPINEPHRINE 2.5; 5 MG/ML; UG/ML
30 INJECTION, SOLUTION EPIDURAL; INFILTRATION; INTRACAUDAL; PERINEURAL ONCE
Status: DISCONTINUED | OUTPATIENT
Start: 2018-03-01 | End: 2018-03-07 | Stop reason: CLARIF

## 2018-03-01 RX ORDER — PROPOFOL 10 MG/ML
INJECTION, EMULSION INTRAVENOUS PRN
Status: DISCONTINUED | OUTPATIENT
Start: 2018-03-01 | End: 2018-03-01

## 2018-03-01 RX ADMIN — Medication 0.5 MG: at 10:10

## 2018-03-01 RX ADMIN — METRONIDAZOLE 500 MG: 500 INJECTION, SOLUTION INTRAVENOUS at 22:00

## 2018-03-01 RX ADMIN — FENTANYL CITRATE 100 MCG: 50 INJECTION, SOLUTION INTRAMUSCULAR; INTRAVENOUS at 13:20

## 2018-03-01 RX ADMIN — PHENYLEPHRINE HYDROCHLORIDE 100 MCG: 10 INJECTION, SOLUTION INTRAMUSCULAR; INTRAVENOUS; SUBCUTANEOUS at 14:14

## 2018-03-01 RX ADMIN — LIDOCAINE HYDROCHLORIDE 50 MG: 10 INJECTION, SOLUTION INFILTRATION; PERINEURAL at 13:20

## 2018-03-01 RX ADMIN — Medication 3 MG: at 18:23

## 2018-03-01 RX ADMIN — ACETAMINOPHEN 1000 MG: 10 INJECTION, SOLUTION INTRAVENOUS at 19:08

## 2018-03-01 RX ADMIN — GLYCOPYRROLATE 0.6 MG: 0.2 INJECTION, SOLUTION INTRAMUSCULAR; INTRAVENOUS at 18:23

## 2018-03-01 RX ADMIN — ROCURONIUM BROMIDE 10 MG: 10 INJECTION INTRAVENOUS at 16:30

## 2018-03-01 RX ADMIN — PANTOPRAZOLE SODIUM 40 MG: 40 INJECTION, POWDER, FOR SOLUTION INTRAVENOUS at 08:39

## 2018-03-01 RX ADMIN — PHENYLEPHRINE HYDROCHLORIDE 100 MCG: 10 INJECTION, SOLUTION INTRAMUSCULAR; INTRAVENOUS; SUBCUTANEOUS at 17:50

## 2018-03-01 RX ADMIN — Medication 0.5 MG: at 07:02

## 2018-03-01 RX ADMIN — SODIUM CHLORIDE, POTASSIUM CHLORIDE, SODIUM LACTATE AND CALCIUM CHLORIDE: 600; 310; 30; 20 INJECTION, SOLUTION INTRAVENOUS at 16:30

## 2018-03-01 RX ADMIN — FENTANYL CITRATE 50 MCG: 50 INJECTION INTRAMUSCULAR; INTRAVENOUS at 18:55

## 2018-03-01 RX ADMIN — ROCURONIUM BROMIDE 10 MG: 10 INJECTION INTRAVENOUS at 15:28

## 2018-03-01 RX ADMIN — CIPROFLOXACIN 400 MG: 2 INJECTION, SOLUTION INTRAVENOUS at 13:49

## 2018-03-01 RX ADMIN — ROCURONIUM BROMIDE 10 MG: 10 INJECTION INTRAVENOUS at 16:52

## 2018-03-01 RX ADMIN — FENTANYL CITRATE 50 MCG: 50 INJECTION, SOLUTION INTRAMUSCULAR; INTRAVENOUS at 15:35

## 2018-03-01 RX ADMIN — Medication 1 MG: at 13:51

## 2018-03-01 RX ADMIN — FENTANYL CITRATE 50 MCG: 50 INJECTION, SOLUTION INTRAMUSCULAR; INTRAVENOUS at 16:19

## 2018-03-01 RX ADMIN — HYDROMORPHONE HYDROCHLORIDE: 10 INJECTION, SOLUTION INTRAMUSCULAR; INTRAVENOUS; SUBCUTANEOUS at 19:40

## 2018-03-01 RX ADMIN — SODIUM CHLORIDE, POTASSIUM CHLORIDE, SODIUM LACTATE AND CALCIUM CHLORIDE: 600; 310; 30; 20 INJECTION, SOLUTION INTRAVENOUS at 13:37

## 2018-03-01 RX ADMIN — Medication 0.5 MG: at 18:04

## 2018-03-01 RX ADMIN — SODIUM CHLORIDE, POTASSIUM CHLORIDE, SODIUM LACTATE AND CALCIUM CHLORIDE: 600; 310; 30; 20 INJECTION, SOLUTION INTRAVENOUS at 13:29

## 2018-03-01 RX ADMIN — SODIUM CHLORIDE, POTASSIUM CHLORIDE, SODIUM LACTATE AND CALCIUM CHLORIDE: 600; 310; 30; 20 INJECTION, SOLUTION INTRAVENOUS at 12:41

## 2018-03-01 RX ADMIN — DEXAMETHASONE SODIUM PHOSPHATE 6 MG: 4 INJECTION, SOLUTION INTRA-ARTICULAR; INTRALESIONAL; INTRAMUSCULAR; INTRAVENOUS; SOFT TISSUE at 13:22

## 2018-03-01 RX ADMIN — ROCURONIUM BROMIDE 10 MG: 10 INJECTION INTRAVENOUS at 16:02

## 2018-03-01 RX ADMIN — ONDANSETRON 4 MG: 2 INJECTION INTRAMUSCULAR; INTRAVENOUS at 18:07

## 2018-03-01 RX ADMIN — ROCURONIUM BROMIDE 20 MG: 10 INJECTION INTRAVENOUS at 13:58

## 2018-03-01 RX ADMIN — PHENYLEPHRINE HYDROCHLORIDE 100 MCG: 10 INJECTION, SOLUTION INTRAMUSCULAR; INTRAVENOUS; SUBCUTANEOUS at 17:00

## 2018-03-01 RX ADMIN — HYDROMORPHONE HYDROCHLORIDE 0.5 MG: 1 INJECTION, SOLUTION INTRAMUSCULAR; INTRAVENOUS; SUBCUTANEOUS at 18:56

## 2018-03-01 RX ADMIN — Medication 0.5 MG: at 02:19

## 2018-03-01 RX ADMIN — SODIUM CHLORIDE, POTASSIUM CHLORIDE, SODIUM LACTATE AND CALCIUM CHLORIDE: 600; 310; 30; 20 INJECTION, SOLUTION INTRAVENOUS at 21:57

## 2018-03-01 RX ADMIN — ROCURONIUM BROMIDE 20 MG: 10 INJECTION INTRAVENOUS at 14:51

## 2018-03-01 RX ADMIN — ROCURONIUM BROMIDE 50 MG: 10 INJECTION INTRAVENOUS at 13:22

## 2018-03-01 RX ADMIN — METRONIDAZOLE 500 MG: 500 INJECTION, SOLUTION INTRAVENOUS at 13:28

## 2018-03-01 RX ADMIN — HYDROMORPHONE HYDROCHLORIDE 0.5 MG: 1 INJECTION, SOLUTION INTRAMUSCULAR; INTRAVENOUS; SUBCUTANEOUS at 19:04

## 2018-03-01 RX ADMIN — PROPOFOL 200 MG: 10 INJECTION, EMULSION INTRAVENOUS at 13:20

## 2018-03-01 RX ADMIN — PHENYLEPHRINE HYDROCHLORIDE 100 MCG: 10 INJECTION, SOLUTION INTRAMUSCULAR; INTRAVENOUS; SUBCUTANEOUS at 16:02

## 2018-03-01 RX ADMIN — SODIUM CHLORIDE, POTASSIUM CHLORIDE, SODIUM LACTATE AND CALCIUM CHLORIDE: 600; 310; 30; 20 INJECTION, SOLUTION INTRAVENOUS at 15:12

## 2018-03-01 RX ADMIN — HEPARIN SODIUM 5000 UNITS: 5000 INJECTION, SOLUTION INTRAVENOUS; SUBCUTANEOUS at 12:40

## 2018-03-01 RX ADMIN — MIDAZOLAM 2 MG: 1 INJECTION INTRAMUSCULAR; INTRAVENOUS at 13:09

## 2018-03-01 ASSESSMENT — ACTIVITIES OF DAILY LIVING (ADL)
ADLS_ACUITY_SCORE: 9

## 2018-03-01 ASSESSMENT — PAIN DESCRIPTION - DESCRIPTORS: DESCRIPTORS: TIGHTNESS

## 2018-03-01 NOTE — ANESTHESIA PREPROCEDURE EVALUATION
Anesthesia Evaluation     . Pt has not had prior anesthetic            ROS/MED HX    ENT/Pulmonary:  - neg pulmonary ROS     Neurologic:  - neg neurologic ROS     Cardiovascular:  - neg cardiovascular ROS       METS/Exercise Tolerance:     Hematologic:  - neg hematologic  ROS       Musculoskeletal:  - neg musculoskeletal ROS       GI/Hepatic:     (+) Other GI/Hepatic colon cancer      Renal/Genitourinary:  - ROS Renal section negative       Endo:  - neg endo ROS       Psychiatric:  - neg psychiatric ROS       Infectious Disease:  - neg infectious disease ROS       Malignancy:      - no malignancy   Other:    - neg other ROS                 Physical Exam  Normal systems: cardiovascular, pulmonary and dental    Airway   Mallampati: I  TM distance: >3 FB  Neck ROM: full    Dental     Cardiovascular       Pulmonary                     Anesthesia Plan      History & Physical Review  History and physical reviewed and following examination; no interval change.    ASA Status:  1 .    NPO Status:  > 8 hours    Plan for General and ETT with Intravenous and Propofol induction. Maintenance will be Balanced.    PONV prophylaxis:  Ondansetron (or other 5HT-3) and Dexamethasone or Solumedrol       Postoperative Care  Postoperative pain management:  Oral pain medications and IV analgesics.      Consents  Anesthetic plan, risks, benefits and alternatives discussed with:  Patient or representative and Patient..                          .

## 2018-03-01 NOTE — PLAN OF CARE
Problem: Bowel Obstruction (Adult)  Goal: Signs and Symptoms of Listed Potential Problems Will be Absent, Minimized or Managed (Bowel Obstruction)  Signs and symptoms of listed potential problems will be absent, minimized or managed by discharge/transition of care (reference Bowel Obstruction (Adult) CPG).  Outcome: No Change  NPO. LR 100ml/hr. IV dilaudid every 2 hours PRN, given 2 times with relief. Surgery planned for 1pm tomorrow. WOC educated pt and wife on ostomy and marked skin. Up independently.

## 2018-03-01 NOTE — PLAN OF CARE
Problem: Patient Care Overview  Goal: Plan of Care/Patient Progress Review  Outcome: No Change  Vitally stable. Reporting 5/10 pain mostly to low back region; IV Dilaudid given x1 which was effective. Denies nausea. No flatus. Strict NPO; offered swabs for dry mouth. LR infusing at 100mL/hour. Ostomy appliance given to patient and wife. Demonstrated use and patient/wife participated in teaching. Very engaged and asked appropriate questions.     Patient transferred to -[facility]/Surg Oncology Unit with IV pump due to change in status to Inpatient and plan for surgery tomorrow.   Patient belongings given to: spouse/kept with patient.   Report called and given to YANIRA Brunner.  Wife, Tennille present at bedside and updated on plan of care. Transferred with patient.

## 2018-03-01 NOTE — PROGRESS NOTES
Focus: ostomy   D/I: review of ostomy education from yesterday, pouching options using Coloplast and ring demonstrated, all questions answered.  A/P: stoma site marked, ostomy education provided, pt ready for surgery, will follow postop

## 2018-03-01 NOTE — PLAN OF CARE
Problem: Patient Care Overview  Goal: Plan of Care/Patient Progress Review  Outcome: No Change  Patient alert and oriented, up independent in room. Showered late morning. Fluids running. NPO for surgery today. Pain 6/10, IV pain medications given, patient reported decrease in pain. Denies nausea. Anxious this AM about upcoming surgery, wife at bedside very supportive. Transferred to pre-op at 1200.

## 2018-03-01 NOTE — PROGRESS NOTES
COLON & RECTAL SURGERY  PROGRESS NOTE    March 1, 2018    SUBJECTIVE:  Pt resting in bed.  Nervous about surgery this afternoon.  NPO.  Denies N/V.    OBJECTIVE:  Temp:  [96.7  F (35.9  C)-99.6  F (37.6  C)] 99.6  F (37.6  C)  Pulse:  [91] 91  Heart Rate:  [64-99] 64  Resp:  [14-18] 18  BP: (111-144)/(67-89) 113/67  SpO2:  [92 %-96 %] 94 %    Intake/Output Summary (Last 24 hours) at 03/01/18 0824  Last data filed at 03/01/18 0656   Gross per 24 hour   Intake             3779 ml   Output                0 ml   Net             3779 ml       GENERAL:  Awake, alert, no acute distress, resting in bed  HEAD: Normocephalic atraumatic  SCLERA: Anicteric  EXTREMITIES: Warm and well perfused  ABDOMEN:  Fairly soft, non-tender, distended. No guarding, rigidity, or peritoneal signs.       LABS:  Lab Results   Component Value Date    WBC 10.5 02/27/2018     Lab Results   Component Value Date    HGB 14.8 02/27/2018     Lab Results   Component Value Date    HCT 44.1 02/27/2018     Lab Results   Component Value Date     02/27/2018     Last Basic Metabolic Panel:  Lab Results   Component Value Date     02/28/2018      Lab Results   Component Value Date    POTASSIUM 4.0 02/28/2018     Lab Results   Component Value Date    CHLORIDE 106 02/28/2018     Lab Results   Component Value Date    JANICE 8.4 02/28/2018     Lab Results   Component Value Date    CO2 26 02/28/2018     Lab Results   Component Value Date    BUN 14 02/28/2018     Lab Results   Component Value Date    CR 0.89 02/28/2018     Lab Results   Component Value Date     02/28/2018       ASSESSMENT/PLAN: 56 yo man with obstructing mass in descending colon.  Afebrile VSS.      1. Planning for surgery with Dr. Delcid at 1 pm this afternoon.    Samantha Lozano PA-C  [facility]  Phone: [phone]

## 2018-03-01 NOTE — PLAN OF CARE
Problem: Patient Care Overview  Goal: Plan of Care/Patient Progress Review  Vitals stable, iv dilaudid for mid-low back pain. Abdomen is firm and distended, bowel sounds hypoactive. Patient is anxious about surgery today, offered emotional support.

## 2018-03-01 NOTE — PROGRESS NOTES
[facility]  Hospitalist Progress Note  Patient Name: Gab Holloway    MRN: [id number]  Provider:  Catrina Rey MD  Date:03/01/2018      Initial presenting complaint/issue to hospital (Diagnosis): abd pain     Summary of Stay: Gab Holloway is a 57 year old healthy male who was admitted on 2/26/2018 with abdominal pain/bloating. Found to have colonic mass with partial/early obstruction suspicious for malignancy. He had flex sig on 2/28 which showed partially obstructing mass- prelim pathology showed adenocarcinoma. He is planned for colectomy today per CRS           Assessment and Plan:      Colonic obstruction 2/2 partially obstructing adenocarcinoma of colon. Pt presented with abd pain and e/o colonic obstruction 2/2 colonic mass.  Had GGE yesterday.Sigmoidoscopy on 2/28, likely malignant obstructing mass per prelim path after sigmoidoscopy   --appreciate CRS consult, plan for colectomy today  --will consult hem/onc  --cont NPO, IVF    Asymptomatic cholelithiasis.         # Pain Assessment:    Current Pain Score 3/1/2018 3/1/2018 3/1/2018   Patient currently in pain? denies yes -   Pain score (0-10) - 6 7   Pain location - Abdomen Abdomen   Pain descriptors - Tightness Tightness   - Gab is experiencing pain due to colonic obstruction. Pain management was discussed and the plan was created in a collaborative fashion.  Gab's response to the current recommendations: engaged  - Please see the plan for pain management as documented above    DVT Prophylaxis:  -  PCD due to surgery-lovenox post op   Code Status: Full Code  Discharge Dispo: home  Estimated Disch Date / # of Days until Discharge: TBD-pending return of bowel function         Interval History:      Pt feels ok-denies pain  Anxious about surgery  Wife at bedside and tearful     Explained plan for surgery and expected recovery        Data reviewed today: I reviewed all new labs and imaging reports over the last 24 hours. I  "personally reviewed no images or EKG's today.         Physical Exam:      Last Vital Signs:  BP (!) 140/94 (BP Location: Right arm)  Pulse 91  Temp 98.1  F (36.7  C) (Oral)  Resp 18  Ht 1.778 m (5' 10\")  Wt 91.2 kg (201 lb 1.6 oz)  SpO2 94%  BMI 28.85 kg/m2  GENERAL: No apparent distress. Awake, alert, and fully oriented.  HEENT: Normocephalic, atraumatic. Extraocular movements intact.  CARDIOVASCULAR: Regular rate and rhythm without murmurs or rubs. No S3.  PULMONARY: Clear bilaterally.  ABDOMINAL: Soft, non-tender,  distended. Bowel sounds absent. No hepatosplenomegaly.  EXTREMITIES: No cyanosis or clubbing. No edema.  NEUROLOGICAL: CN 2-12 grossly intact, no focal neurological deficits.  DERMATOLOGICAL: No rash, ulcer, ecchymoses, jaundice.  PSYCH: appropriate           Medications:      All current medications were reviewed.         Data:      All new lab and imaging data was reviewed.   Data       Recent Labs  Lab 02/27/18  0545 02/26/18  2320   WBC 10.5 10.8   HGB 14.8 15.8   HCT 44.1 46.1   MCV 99 97    243       Recent Labs  Lab 02/28/18  0636 02/27/18  0545 02/26/18  2320    139 137   POTASSIUM 4.0 3.9 3.6   CHLORIDE 106 107 104   CO2 26 25 24   ANIONGAP 6 7 9   * 121* 115*   BUN 14 14 16   CR 0.89 0.86 0.94   GFRESTIMATED 88 >90 83   GFRESTBLACK >90 >90 >90   JANICE 8.4* 8.5 9.5       No results found for this or any previous visit (from the past 24 hour(s)).    Catrina Rey MD  Hospitalist  [facility]  [address] [place] [address]    "

## 2018-03-02 LAB
GLUCOSE SERPL-MCNC: 129 MG/DL (ref 70–99)
PLATELET # BLD AUTO: 126 10E9/L (ref 150–450)

## 2018-03-02 PROCEDURE — 12000000 ZZH R&B MED SURG/OB

## 2018-03-02 PROCEDURE — 25000125 ZZHC RX 250: Performed by: COLON & RECTAL SURGERY

## 2018-03-02 PROCEDURE — G0463 HOSPITAL OUTPT CLINIC VISIT: HCPCS | Performed by: NURSE PRACTITIONER

## 2018-03-02 PROCEDURE — 82947 ASSAY GLUCOSE BLOOD QUANT: CPT | Performed by: HOSPITALIST

## 2018-03-02 PROCEDURE — 85049 AUTOMATED PLATELET COUNT: CPT | Performed by: HOSPITALIST

## 2018-03-02 PROCEDURE — 25000128 H RX IP 250 OP 636: Performed by: COLON & RECTAL SURGERY

## 2018-03-02 PROCEDURE — 40000556 ZZH STATISTIC PERIPHERAL IV START W US GUIDANCE

## 2018-03-02 PROCEDURE — 40000903 ZZH STATISTIC WOC PT EDUCATION, 31-45 MIN: Performed by: NURSE PRACTITIONER

## 2018-03-02 PROCEDURE — 99207 ZZC NON-BILLABLE SERV PER CHARTING: CPT | Performed by: HOSPITALIST

## 2018-03-02 PROCEDURE — 36415 COLL VENOUS BLD VENIPUNCTURE: CPT | Performed by: HOSPITALIST

## 2018-03-02 RX ADMIN — SODIUM CHLORIDE, POTASSIUM CHLORIDE, SODIUM LACTATE AND CALCIUM CHLORIDE: 600; 310; 30; 20 INJECTION, SOLUTION INTRAVENOUS at 04:51

## 2018-03-02 RX ADMIN — METRONIDAZOLE 500 MG: 500 INJECTION, SOLUTION INTRAVENOUS at 14:32

## 2018-03-02 RX ADMIN — CIPROFLOXACIN 400 MG: 2 INJECTION, SOLUTION INTRAVENOUS at 14:32

## 2018-03-02 RX ADMIN — SODIUM CHLORIDE, POTASSIUM CHLORIDE, SODIUM LACTATE AND CALCIUM CHLORIDE: 600; 310; 30; 20 INJECTION, SOLUTION INTRAVENOUS at 22:15

## 2018-03-02 RX ADMIN — ACETAMINOPHEN 1000 MG: 10 INJECTION, SOLUTION INTRAVENOUS at 06:31

## 2018-03-02 RX ADMIN — ACETAMINOPHEN 1000 MG: 10 INJECTION, SOLUTION INTRAVENOUS at 01:40

## 2018-03-02 RX ADMIN — ENOXAPARIN SODIUM 40 MG: 40 INJECTION SUBCUTANEOUS at 12:04

## 2018-03-02 RX ADMIN — ACETAMINOPHEN 1000 MG: 10 INJECTION, SOLUTION INTRAVENOUS at 19:09

## 2018-03-02 RX ADMIN — SODIUM CHLORIDE, POTASSIUM CHLORIDE, SODIUM LACTATE AND CALCIUM CHLORIDE: 600; 310; 30; 20 INJECTION, SOLUTION INTRAVENOUS at 14:09

## 2018-03-02 RX ADMIN — HYDROMORPHONE HYDROCHLORIDE: 10 INJECTION, SOLUTION INTRAMUSCULAR; INTRAVENOUS; SUBCUTANEOUS at 15:04

## 2018-03-02 RX ADMIN — METRONIDAZOLE 500 MG: 500 INJECTION, SOLUTION INTRAVENOUS at 05:41

## 2018-03-02 RX ADMIN — ACETAMINOPHEN 1000 MG: 10 INJECTION, SOLUTION INTRAVENOUS at 12:08

## 2018-03-02 RX ADMIN — CIPROFLOXACIN 400 MG: 2 INJECTION, SOLUTION INTRAVENOUS at 01:45

## 2018-03-02 RX ADMIN — HYDROMORPHONE HYDROCHLORIDE: 10 INJECTION, SOLUTION INTRAMUSCULAR; INTRAVENOUS; SUBCUTANEOUS at 06:33

## 2018-03-02 RX ADMIN — PANTOPRAZOLE SODIUM 40 MG: 40 INJECTION, POWDER, FOR SOLUTION INTRAVENOUS at 08:07

## 2018-03-02 ASSESSMENT — ACTIVITIES OF DAILY LIVING (ADL)
ADLS_ACUITY_SCORE: 11
ADLS_ACUITY_SCORE: 11
ADLS_ACUITY_SCORE: 10
ADLS_ACUITY_SCORE: 11
ADLS_ACUITY_SCORE: 11
ADLS_ACUITY_SCORE: 9

## 2018-03-02 ASSESSMENT — PAIN DESCRIPTION - DESCRIPTORS: DESCRIPTORS: CRAMPING

## 2018-03-02 NOTE — CONSULTS
CLINICAL NUTRITION SERVICES  -  BRIEF NOTE    - Received low fiber diet education consult.  - Patient post-op day 1 and remains NPO.   - Not currently appropriate, will follow-up to provide education as able and with diet advancement.    Geovanna Vu RD, LD  Clinical Dietitian  3rd floor/ICU: [phone]  All other floors: [phone]  Weekend/holiday: [phone]

## 2018-03-02 NOTE — PLAN OF CARE
Problem: Patient Care Overview  Goal: Plan of Care/Patient Progress Review  Outcome: No Change  Orientation: A&Ox4   VS: Temp: 97.5  F (36.4  C) Temp src: Oral BP: 112/64 Pulse: 105 Heart Rate: 110 Resp: 20 SpO2: 91 % 2L O2   LS: clear  Cardiac: tachycardic   GI: ng tube on intermittent suction, hypoactive bs   : long catheter in place  Skin: incision   Activity: assist 1   Pain: pain managed with PCA  Lines/IV: 2 piv   Plan:  IV abx, PCA until pain managed with oral

## 2018-03-02 NOTE — PROGRESS NOTES
"Colon & Rectal Surgery Progress Note             Interval History:   Postop Day #: 1  Lap to open left hemicolectomy with transverse colostomy on the right  No nausea, up some  Pain controlled with PCA                Medications:   I have reviewed this patient's current medications               Physical Exam:   Blood pressure 95/59, pulse 105, temperature 96.6  F (35.9  C), temperature source Oral, resp. rate 20, height 1.778 m (5' 10\"), weight 91.2 kg (201 lb 1.6 oz), SpO2 93 %.    Intake/Output Summary (Last 24 hours) at 03/02/18 0930  Last data filed at 03/02/18 0546   Gross per 24 hour   Intake             6126 ml   Output             1375 ml   Net             4751 ml     GEN:  alert  ABD:  Firm round, stoma with edema, red. Serosang output         Data:        Lab Results   Component Value Date     03/01/2018    Lab Results   Component Value Date    CHLORIDE 106 03/01/2018    Lab Results   Component Value Date    BUN 12 03/01/2018      Lab Results   Component Value Date    POTASSIUM 4.7 03/01/2018    Lab Results   Component Value Date    CO2 23 03/01/2018    Lab Results   Component Value Date    CR 0.74 03/01/2018    CR 0.89 02/28/2018        Lab Results   Component Value Date    HGB 16.6 03/01/2018    HGB 14.8 02/27/2018     Lab Results   Component Value Date     (L) 03/02/2018     02/27/2018     Lab Results   Component Value Date    WBC 10.5 02/27/2018    WBC 10.8 02/26/2018            Assessment and Plan:   Continue NG, NPO, ethan  Encourage ambulation  Stoma care and teaching.  Possible clamping trial tomorrow.  Will DC with lovenox so needs that teaching too.     Joann Delcid MD  [facility]  Office Phone # [phone]    "

## 2018-03-02 NOTE — PROGRESS NOTES
[facility]  Hospitalist Progress Note  Patient Name: Gab Holloway    MRN: [id number]  Provider:  Catrina Rey MD  Date:03/02/2018      Initial presenting complaint/issue to hospital (Diagnosis): abd pain     Summary of Stay: Gab Holloway is a 57 year old healthy male who was admitted on 2/26/2018 with abdominal pain/bloating. Found to have colonic mass with partial/early obstruction suspicious for malignancy. He had flex sig on 2/28 which showed partially obstructing mass- prelim pathology showed adenocarcinoma. He is s/p open colectomy w/ colostomy on 3/1/2018 per CRS-awaiting return of bowel function. Hem/onc consulted for planning of future treatment and discussion about prognosis            Assessment and Plan:      Colonic obstruction 2/2 partially obstructing adenocarcinoma of colon s/p partial colectomy w/ colostomy on 3/1/2018. Pt presented with abd pain and e/o colonic obstruction 2/2 colonic mass.  Had GGE yesterday.Sigmoidoscopy on 2/28, likely malignant obstructing mass per prelim path after sigmoidoscopy   --appreciate CRS consult, s/p colectomy on 3/1 without any immediate complication  --will consult hem/onc  --cont NPO, IVF  --on dilaudid PCA  --cont NG tube decompression, diet advancement per surgery     Asymptomatic cholelithiasis.         # Pain Assessment:    Current Pain Score 3/2/2018 3/2/2018 3/2/2018   Patient currently in pain? yes denies yes   Pain score (0-10) 8 - 6   Pain location Abdomen - Abdomen   Pain descriptors Cramping - -   - Gab is experiencing pain due to colonic obstruction. Pain management was discussed and the plan was created in a collaborative fashion.  Gab's response to the current recommendations: engaged  - Please see the plan for pain management as documented above    DVT Prophylaxis:  -  PCD due to surgery-lovenox post op   Code Status: Full Code  Discharge Dispo: home  Estimated Disch Date / # of Days until Discharge: TBD-pending  "return of bowel function         Interval History:      Pt feels ok-denies pain  Anxious about surgery  Wife at bedside and tearful     Explained plan for surgery and expected recovery        Data reviewed today: I reviewed all new labs and imaging reports over the last 24 hours. I personally reviewed no images or EKG's today.         Physical Exam:      Last Vital Signs:  /64 (BP Location: Right arm)  Pulse 105  Temp 97.5  F (36.4  C) (Oral)  Resp 20  Ht 1.778 m (5' 10\")  Wt 91.2 kg (201 lb 1.6 oz)  SpO2 91%  BMI 28.85 kg/m2  GENERAL: No apparent distress. Awake, alert, and fully oriented.  HEENT: Normocephalic, atraumatic. Extraocular movements intact.  CARDIOVASCULAR: Regular rate and rhythm without murmurs or rubs. No S3.  PULMONARY: Clear bilaterally.  ABDOMINAL: Soft, non-tender,  distended. Bowel sounds absent. No hepatosplenomegaly.  EXTREMITIES: No cyanosis or clubbing. No edema.  NEUROLOGICAL: CN 2-12 grossly intact, no focal neurological deficits.  DERMATOLOGICAL: No rash, ulcer, ecchymoses, jaundice.  PSYCH: appropriate           Medications:      All current medications were reviewed.         Data:      All new lab and imaging data was reviewed.   Data       Recent Labs  Lab 03/02/18 0737 03/01/18 1902 02/27/18  0545 02/26/18  2320   WBC  --   --  10.5 10.8   HGB  --  16.6 14.8 15.8   HCT  --   --  44.1 46.1   MCV  --   --  99 97   *  --  212 243       Recent Labs  Lab 03/02/18 0737 03/01/18 1902 02/28/18  0636 02/27/18  0545   NA  --  136 138 139   POTASSIUM  --  4.7 4.0 3.9   CHLORIDE  --  106 106 107   CO2  --  23 26 25   ANIONGAP  --  7 6 7   * 135* 124* 121*   BUN  --  12 14 14   CR  --  0.74 0.89 0.86   GFRESTIMATED  --  >90 88 >90   GFRESTBLACK  --  >90 >90 >90   JANICE  --  7.6* 8.4* 8.5       No results found for this or any previous visit (from the past 24 hour(s)).    Catrina Rey MD  Hospitalist  [facility] [address] " [place] [address]

## 2018-03-02 NOTE — BRIEF OP NOTE
Colon & Rectal Surgery Brief Operative Note      Pre-operative diagnosis: unknown   Post-operative diagnosis: obstructing proximal decending colon cancer.   Procedure: Procedure(s):  LAPAROSCOPIC Converted to Open LEFT COLECTOMY   Surgeon: Dr. Delcid   Assistant(s): JANA Lee fellow   Anesthesia: General   Estimated blood loss: 100   Drains  Disposition:  Findings  Specimens: None  Admitted to [email or web address]  See dictated operative report for full details.    ID Type Source Tests Collected by Time Destination   A : peritoneal fluid Fluid Peritoneum ANAEROBIC BACTERIAL CULTURE, FLUID CULTURE AEROBIC BACTERIAL, GRAM STAIN, CYTOLOGY NON GYN Joann Delcid MD 3/1/2018  2:00 PM    B : Left Colon  Tissue Colon SURGICAL PATHOLOGY EXAM Joann Delcid MD 3/1/2018  5:30 PM                                                             Condition on discharge from OR: Satisfactory    Sami Villa MD   [facility]   [phone].        ADDENDUM:    PATIENT DATA  Indicate Y or N:  Home O2 No  Hemodialysis  No  Transplant patient  No  Cirrhosis  No  Steroids in last 30 days  No  Immunomodulators in last 30 days  No  Anticoagulation at time of surgery  No   List medication   Prior abdominal surgery  No  Pelvic irradiation  No    Albumin within 30 days if known    Hgb within 30 days if known    Hemoglobin   Date Value Ref Range Status   02/27/2018 14.8 13.3 - 17.7 g/dL Final   ]  Cr within 30 days if known    Creatinine   Date Value Ref Range Status   02/28/2018 0.89 0.66 - 1.25 mg/dL Final   ]  Body mass index is 28.85 kg/(m^2).      OR DATA  Emergent  Yes   <24 hours  No   <1 week  Yes  Bowel Prep No  Antibiotics  Yes  DVT prophylaxis    Heparin  Yes   SCD  Yes   None  No  Drain  No  ASA (1,2,3,4) 3  OR time (min) 268  Stents  No  Transfuse >/= 2U  No  Anastomosis   Stapled  No   Handsewn  No  Leak Test    Positive  No   Negative  No   Not done  No    FOR CANCER ALSO COMPLETE:  Preoperative treatment (Y  or N)   Chemo  No   Radiation No   Diversion  No  Preoperative Stage   CT  Yes   MRI No   US  No   Clinical  Yes   Unknown  No   T stage (1,2,3,4) 3   N stage (0,1,2) 0   M stage (0,1) 0  CEA 0.5  Metastatic disease at time of operation  No       Route (Have a good day)

## 2018-03-02 NOTE — ANESTHESIA POSTPROCEDURE EVALUATION
Patient: Gab Holloway    Procedure(s):   laparoscopic assistedconverted to open left leilani colectomy and colostomy - Wound Class: II-Clean Contaminated    Diagnosis:unknown  Diagnosis Additional Information: Pre-operative diagnosis: unknown  Post-operative diagnosis: obstructing proximal decending colon cancer.  Procedure: Procedure(s):  LAPAROSCOPIC Converted to Open LEFT COLECTOMY        Anesthesia Type:  General, ETT    Note:  Anesthesia Post Evaluation    Patient location during evaluation: PACU  Patient participation: Able to fully participate in evaluation  Level of consciousness: awake  Pain management: adequate  Airway patency: patent  Cardiovascular status: acceptable  Respiratory status: acceptable  Hydration status: euvolemic  PONV: controlled     Anesthetic complications: None          Last vitals:  Vitals:    03/01/18 1915 03/01/18 1930 03/01/18 1945   BP: (!) 136/100 (!) 125/94 127/84   Pulse:      Resp: 16 17 11   Temp:   97.5  F (36.4  C)   SpO2: 98% 97% 95%         Electronically Signed By: Neftaly Marsh MD  March 1, 2018  8:17 PM

## 2018-03-02 NOTE — PLAN OF CARE
Problem: Patient Care Overview  Goal: Plan of Care/Patient Progress Review  Outcome: Improving  Stable vitals, afebrile. drsg abd with sm marked drainage. Pain tolerable with pca dilaudid. NG to LIS, scant. Blount patent and intact. Ostomy pink, moist and patent of serosang drainage, with red santana catheter intact in stoma. Using 4l nc oxygen with good sats. Wife present and supportive, very anxious, reassured repeatedly.

## 2018-03-02 NOTE — CONSULTS
Hematology / Oncology Consultation      Gab Holloway MRN# [id number]   YOB: 1960 Age: 57 year old   Date of Admission: 2/26/2018     Reason for consult: Colon cancer           Assessment and Plan:   #1 Obstructive left-sided colon cancer, s/p left hemicolectomy    It was my pleasure to see the patient in the hospital today.  He underwent left hemicolectomy for his newly diagnosed obstructive left-sided colon cancer yesterday.  Pathology report is currently pending.    I informed the patient that if he is found to have lymph node positive disease (stage III), I would strongly recommend a course of adjuvant chemotherapy to decrease the risk of recurrence.  If his disease is lymph node negative, we would need to assess the risk factors, including extent, grade, and MSI status of the tumor to determine the need for adjuvant chemotherapy.  He already has one adverse risk factor, which is the obstructive nature of the tumor.    PLAN:  1. Follow up on pathology report  2. Will arrange follow-up as an outpatient    Raj Luke M.D.  [facility]             Chief Complaint:   Colon cancer         History of Present Illness:   This is a very pleasant 58-year-old gentleman who presented to the hospital earlier this week for worsening abdominal pain associated with constipation.  CT of the abdomen and pelvis demonstrated a mass in the proximal descending colon.  He had a Gastrografin study which demonstrated near complete obstruction.  CT scan of the chest/abdomen/pelvis did not demonstrate any evidence of distant metastases.  He underwent a laparoscopic converted to open left hemicolectomy by Dr. Delcid yesterday.    I visited the patient this morning in the hospital.  His surgical site pain is relatively well-controlled.  He is still n.p.o.  I noted dark brown liquid output in the colosomy bag.  The patient is relatively healthy, and does not have any other medical history.              Past Medical  History:   Unremarkable            Social History:     Social History   Substance Use Topics     Smoking status: Never Smoker     Smokeless tobacco: Never Used     Alcohol use Yes      Comment: weekends             Family History:   History reviewed. No pertinent family history.          Medications:   None          Review of Systems:   The 10 point Review of Systems is negative other than noted in the HPI            Physical Exam:   Vitals were reviewed  Temp: 97.5  F (36.4  C) Temp src: Oral BP: 112/64 Pulse: 105 Heart Rate: 110 Resp: 20 SpO2: 91 % O2 Device: None (Room air) Oxygen Delivery: 4 LPM  General: Awake, alert, and oriented.  HEENT:  NGT in place  Skin:  No bruising or skin rash noted.  Eyes:   Sclera anicteric.    Mouth:  Moist mucous membranes without ulceration  Lymphatics: No palpable lymphadenopathy  Abdomen:  Soft, nontender, nondistended.  No organomegaly.  Extremities:  Well perfused, no edema.         Data:     Lab Results   Component Value Date    WBC 10.5 02/27/2018    HGB 16.6 03/01/2018    HCT 44.1 02/27/2018     (L) 03/02/2018     03/01/2018    POTASSIUM 4.7 03/01/2018    CHLORIDE 106 03/01/2018    CO2 23 03/01/2018    BUN 12 03/01/2018    CR 0.74 03/01/2018     (H) 03/02/2018    AST 17 02/26/2018    ALT 15 02/26/2018    ALKPHOS 75 02/26/2018    BILITOTAL 0.7 02/26/2018    INR 1.05 02/28/2018

## 2018-03-02 NOTE — ANESTHESIA CARE TRANSFER NOTE
Patient: Gab Holloway    Procedure(s):   laparoscopic assistedconverted to open left leilani colectomy and colostomy - Wound Class: II-Clean Contaminated    Diagnosis: unknown  Diagnosis Additional Information: No value filed.    Anesthesia Type:   General, ETT     Note:  Airway :Face Mask  Patient transferred to:PACU  Comments:   4/4, moving all extremities, pt follows commands, suctioned orally, extubated without complications.Pt tx to PACU, VSS, pt comfortable. Report given to  PACU RN.Handoff Report: Identifed the Patient, Identified the Reponsible Provider, Reviewed the pertinent medical history, Discussed the surgical course, Reviewed Intra-OP anesthesia mangement and issues during anesthesia, Set expectations for post-procedure period and Allowed opportunity for questions and acknowledgement of understanding      Vitals: (Last set prior to Anesthesia Care Transfer)    CRNA VITALS  3/1/2018 1819 - 3/1/2018 1849      3/1/2018             Pulse: 103    SpO2: 95 %                Electronically Signed By: RUBEN Blas CRNA  March 1, 2018  6:49 PM

## 2018-03-02 NOTE — PROGRESS NOTES
[facility] Nurse Inpatient Ostomy Assessment      Assessment of ostomy and needs for:  Colostomy      Data:   History:  Transverse colostomy and left hemicolectomy due to Adenocarcinoma, per Dr. Delcid on 3-1-18.     Type of ostomy:  Colostomy  Stoma assessment:   ? Size of stoma:  2 1/4,  Viable, beefy red, very protuberant. Red santana intact.  ? A bridge in place?: No  Stent(s) in place?: No,Mucocutaneous Junction (MCJ):  intact  Peristomal skin:  intact  Abdominal assessment: firm  Output: I/O last 3 completed shifts:  In: 6126 [I.V.:6066; NG/GT:60]  Out: 1375 [Urine:675; Emesis/NG output:550; Stool:50; Blood:100]  Current pouching system:  70 mm drainable pouch   Pain: no    Diet:             Active Diet Order      NPO for Medical/Clinical Reasons Except for: Meds, Ice Chips  Labs:   Recent Labs   Lab Test  03/01/18   1902  02/28/18   0636  02/27/18   0545  02/26/18   2320   ALBUMIN   --    --    --   4.1   HGB  16.6   --   14.8  15.8   INR   --   1.05   --    --    WBC   --    --   10.5  10.8           Intervention:     Patient's chart evaluated.      Assessments done today:  Assessed stoma    Education: Changed pouch with pt and wife Susan asking appropriate questions. Demonstrated pouch emptying. Wife to assist pt at home at the beginning. Both with good understanding.    Prepared for discharge by: discussed how to contact [facility] after discharge.    Pt registered for ostomy supply samples? no         Assessment:     Stoma assessment: viable, normal-appearing and beefy red    Learning needs/ comprehension: pt and wife very interested in learning, had read literature. Recommended he watch you tube videos of pouch changes when at home.    Effectiveness of current pouching/ supply plan: intact from surgery yesterday before change today.         Plan:     Plan:     Current pouching system: 70 mm Satish drainable pouch    Education:  ? Education completed:  Pouch Emptying and Pouch Replacement,  Use of clamp, How to cuff and clean pouch, How to empty pouch, Removal of pouch, Preparation of new pouch and Cutting out or evaluating a pattern   ? Is pt able to demonstrate: 1. how to empty their pouch?  No: Pt observed as I demonstrated.  2. How to read a measurement on a graduated cylinder?  No:   3. How to write down correct I and O's?   No:   ? Educational needs: continue education of pouch change and emptying, diet  o Continue to prepare for discharge: No discharge preparations started  o Supply company: [employer]  o Do [facility] Nurse recommend home care  Not yet determined.Wife very supportive.     Face to face time: > 60 Minutes

## 2018-03-02 NOTE — PLAN OF CARE
Problem: Patient Care Overview  Goal: Plan of Care/Patient Progress Review  Orientation: A/O x4, calls as needed, sleepy this shift. Wife present at bedside and showing a lot of anxiety at times and encouraged to relax and take deep breaths to avoid added anxiety onto pt.  VS stable, afebrile, reports constant pain to abdomen well managed with PCA interventions per pt. Pt encouraged to utilize PCA when in pain and often found not pressing button unless nursing is present and suggests related to physical signs of pain.   LS: clear and equal  GI: - Flatus - BM via ostomy except liquid, blood output-red russell present in stoma. Denies N/V. BS hypoactive  : okay output via long, bright red tinged urine.  Diet: NPO except ice chips  PIV: Saline Locked to left side and LR at 125ml/hr via right side with PCA.   NG: to low-intermittent suction and thick output intermittently causing suction to clog. Flushed x2 this shift with immediate results following.   Antibiotics: IV Cipro Q12H, IV Flagyl Q8H  Skin: midline incision covered with dressing with dried drainage present  Activity: due to dangle at bedside-refused this shift due to fatigue / pain. Pt slept comfortably throughout shift.   Disposition: Expected discharge TBD

## 2018-03-02 NOTE — OP NOTE
Procedure Date: 03/01/2018      POSTOPERATIVE DIAGNOSIS:  Obstructing descending colon cancer.      POSTOPERATIVE DIAGNOSIS:  Obstructing descending colon cancer.      PROCEDURE:  Laparoscopic converted to open left hemicolectomy.      SURGEON:  Joann Delcid MD      ASSISTANT:  Dr. Sami Villa, [facility], Colorectal Surgery fellow.      ANESTHESIA:  General endotracheal anesthesia plus a tap block using 30 mL of 0.25% Marcaine laparoscopic-guided.      INDICATIONS:  Gab is a 58-year-old man who presented to the hospital on the 26th with worsening abdominal pain, some diarrhea and cramping.  CT scan demonstrated a mass in the very proximal descending colon.  This was a noncontrast CT scan.  He had a Gastrografin enema that demonstrated near complete obstructing apple core lesion consistent with the mass seen on the CT scan.  Flex sig and biopsies done yesterday did confirm cancer.  He had a CT scan that did not demonstrate any evidence of metastatic disease.  Given the fact he was obstructed and the diagnosis of cancer, we planned for a left hemicolectomy and colostomy.  We discussed the risks of bleeding, infection, both superficial and deep, other risks associated with major abdominal surgery and general anesthesia including, but not limited to DVT, PE, heart attack, stroke, urinary tract infection, damage to surrounding structures and even death.  He understood and wished to proceed.      FINDINGS:  Upon entering the abdomen with the laparoscopic port incision, there was copious ascites.  We did insufflate the abdomen and did not see any evidence of metastatic disease, laparoscopically, but given how diffusely dilated his small bowel and colon was, as well as the significant ascites, we opened.  We ended up extending from just below the umbilicus up to the xiphoid.  There was no evidence of peritoneal disease.  There was a large mass that was nearly completely obstructed just on the descending side  at the splenic flexure.  He had a very high flexure and this was a tedious dissection.      DESCRIPTION OF PROCEDURE:  After informed consent was obtained, the patient was taken to the Operating Room and positioned in the supine position.  He was intubated and a Blount catheter was placed.  He was then positioned in modified lithotomy position with his arms tucked at his side.  He was secured to the bed with wide strapping tape.  His abdomen was shaved, prepped and draped in the usual fashion.  After appropriate timeout, I began by making a 1 cm incision below the umbilicus.  Dissection was carried down through the fascia and the peritoneal cavity was entered without difficulty.  An 0 Vicryl suture was placed at the level of fascia and a Clemencia port was inserted.  As we did enter into the peritoneal cavity, there was copious tan, straw-colored ascites.  This was sampled for culture.  The abdomen was then insufflated to a pressure of 15 and a 1030 scope was inserted.  At this point, we could see that we had very little working space, as he was quite muscular and his bowel was very dilated.  We were able to inspect the liver, the peritoneal cavity, and generally did not have any evidence of metastatic disease.  At this point, given how dilated he was, we elected to open.  A roughly 15 cm incision was made and we started smaller but then ended up enlarging that throughout the procedure, given how dilated things were.  Once we opened, we placed a large Lito in place and then got a Bookwalter to retract.  The small bowel was initially attempted to be packed away, but was very dilated.  We then decompressed a portion of that into the stomach into the previously placed nasogastric tube.  This allowed for a little better visualization.  We then began with the transverse colon which was visualized in the wound and entered into the lesser sac and divided the attachments of the omentum off of the transverse colon, heading  towards the splenic flexure.  This was a very high splenic flexure and things were very dilated making further dissection up in this quadrant quite difficult.  We then turned to the lateral attachments distal to the tumor and were able to incise this.  The tattooing was quite easily seen, as was the mass.      We incised along laterally.  We then, going back and forth from lateral to medial, were able to bring the colon out of the splenic flexure, very careful not to pull on the spleen.  This was tedious to come around, but we were eventually able to.  At some point during this, we did decompress the colon by making a colotomy on the specimen side of the transverse colon and decompressing that using the suction .  There was very minimal spillage during this.  We then made a window in the mesentery in the mid portion of the transverse colon where we intended to divide and divided that.  This allowed us to pack off the proximal bowel and to have better access to the flexure.  Now that it was decompressed, we could come around more completely and safely and were able to elevate the full flexure up off the retroperitoneum.  Once we were able to do this, we divided the distal bowel using an additional fire of the MAGDALENO 80 blue load.  We then divided the mesentery down towards its root and came up around the splenic flexure.  We did take the attachments of the ligament of Treitz down so that we could get a nice high ligation.  Once we had divided the entire mesentery, we passed the specimen off the field and it was opened.  There was a nice proximal and distal margin with a large obstructing tumor.  We then irrigated it and inspected the cut edge of the mesentery, which was nicely hemostatic.  We did mobilize the proximal transverse colon a little further and brought his end colostomy out through the right upper quadrant previously marked site.  In order to do this, we removed the Lito.  We irrigated with 2  liters of warm saline and excised the disk of skin, made a cruciate incision in the fascia, and brought up the stoma which the mesentery directed medially for this mid transverse colon colostomy.  This was then secured after proper orientation with a Blake externally and the fascia was reapproximated with an 0 looped PDS with 4 interrupted internal retention sutures using 0 Vicryl simple interrupted suture in between 4 internal retention sutures.  The wound was irrigated and then the skin edges were reapproximated with a stapler.  We then toweled this off and excised the staple line of the stoma and Brooked this nicely.  It looked nicely perfused and widely patent and hemostatic.  A dry sterile dressing was placed over the wound and the stoma appliance was placed after inserting an 18-Romansh red rubber catheter.      ESTIMATED BLOOD LOSS:  100 mL.         No immediate complications.        Revised DOS 2018 [facility]         JOANN DELCID MD             D: 2018   T: 2018   MT:       Name:     TEDDY CLEARY   MRN:      [id number]        Account:        [id number]   :      1960           Procedure Date: 2018      Document: [id number]       cc: Joann Delcid MD

## 2018-03-03 LAB
ANION GAP SERPL CALCULATED.3IONS-SCNC: 5 MMOL/L (ref 3–14)
BUN SERPL-MCNC: 13 MG/DL (ref 7–30)
CALCIUM SERPL-MCNC: 7.7 MG/DL (ref 8.5–10.1)
CHLORIDE SERPL-SCNC: 105 MMOL/L (ref 94–109)
CO2 SERPL-SCNC: 27 MMOL/L (ref 20–32)
CREAT SERPL-MCNC: 0.89 MG/DL (ref 0.66–1.25)
ERYTHROCYTE [DISTWIDTH] IN BLOOD BY AUTOMATED COUNT: 14.7 % (ref 10–15)
GFR SERPL CREATININE-BSD FRML MDRD: 87 ML/MIN/1.7M2
GLUCOSE SERPL-MCNC: 95 MG/DL (ref 70–99)
HCT VFR BLD AUTO: 34.9 % (ref 40–53)
HGB BLD-MCNC: 11.4 G/DL (ref 13.3–17.7)
MCH RBC QN AUTO: 33.2 PG (ref 26.5–33)
MCHC RBC AUTO-ENTMCNC: 32.7 G/DL (ref 31.5–36.5)
MCV RBC AUTO: 102 FL (ref 78–100)
PLATELET # BLD AUTO: 111 10E9/L (ref 150–450)
POTASSIUM SERPL-SCNC: 3.9 MMOL/L (ref 3.4–5.3)
RBC # BLD AUTO: 3.43 10E12/L (ref 4.4–5.9)
SODIUM SERPL-SCNC: 137 MMOL/L (ref 133–144)
WBC # BLD AUTO: 9.5 10E9/L (ref 4–11)

## 2018-03-03 PROCEDURE — 99207 ZZC NON-BILLABLE SERV PER CHARTING: CPT | Performed by: HOSPITALIST

## 2018-03-03 PROCEDURE — 85049 AUTOMATED PLATELET COUNT: CPT | Performed by: HOSPITALIST

## 2018-03-03 PROCEDURE — 25000125 ZZHC RX 250: Performed by: COLON & RECTAL SURGERY

## 2018-03-03 PROCEDURE — 80048 BASIC METABOLIC PNL TOTAL CA: CPT | Performed by: HOSPITALIST

## 2018-03-03 PROCEDURE — 25000128 H RX IP 250 OP 636: Performed by: COLON & RECTAL SURGERY

## 2018-03-03 PROCEDURE — 85027 COMPLETE CBC AUTOMATED: CPT | Performed by: HOSPITALIST

## 2018-03-03 PROCEDURE — 36415 COLL VENOUS BLD VENIPUNCTURE: CPT | Performed by: HOSPITALIST

## 2018-03-03 PROCEDURE — 12000000 ZZH R&B MED SURG/OB

## 2018-03-03 RX ADMIN — SODIUM CHLORIDE, POTASSIUM CHLORIDE, SODIUM LACTATE AND CALCIUM CHLORIDE: 600; 310; 30; 20 INJECTION, SOLUTION INTRAVENOUS at 06:29

## 2018-03-03 RX ADMIN — ACETAMINOPHEN 1000 MG: 10 INJECTION, SOLUTION INTRAVENOUS at 13:36

## 2018-03-03 RX ADMIN — HYDROMORPHONE HYDROCHLORIDE: 10 INJECTION, SOLUTION INTRAMUSCULAR; INTRAVENOUS; SUBCUTANEOUS at 15:00

## 2018-03-03 RX ADMIN — ACETAMINOPHEN 1000 MG: 10 INJECTION, SOLUTION INTRAVENOUS at 01:12

## 2018-03-03 RX ADMIN — SODIUM CHLORIDE, POTASSIUM CHLORIDE, SODIUM LACTATE AND CALCIUM CHLORIDE: 600; 310; 30; 20 INJECTION, SOLUTION INTRAVENOUS at 14:58

## 2018-03-03 RX ADMIN — ENOXAPARIN SODIUM 40 MG: 40 INJECTION SUBCUTANEOUS at 13:36

## 2018-03-03 RX ADMIN — PANTOPRAZOLE SODIUM 40 MG: 40 INJECTION, POWDER, FOR SOLUTION INTRAVENOUS at 08:54

## 2018-03-03 RX ADMIN — ACETAMINOPHEN 1000 MG: 10 INJECTION, SOLUTION INTRAVENOUS at 21:15

## 2018-03-03 RX ADMIN — ACETAMINOPHEN 1000 MG: 10 INJECTION, SOLUTION INTRAVENOUS at 06:32

## 2018-03-03 RX ADMIN — SODIUM CHLORIDE, POTASSIUM CHLORIDE, SODIUM LACTATE AND CALCIUM CHLORIDE: 600; 310; 30; 20 INJECTION, SOLUTION INTRAVENOUS at 22:40

## 2018-03-03 ASSESSMENT — PAIN DESCRIPTION - DESCRIPTORS
DESCRIPTORS: ACHING;CONSTANT
DESCRIPTORS: ACHING

## 2018-03-03 ASSESSMENT — ACTIVITIES OF DAILY LIVING (ADL)
ADLS_ACUITY_SCORE: 10
ADLS_ACUITY_SCORE: 11

## 2018-03-03 NOTE — PLAN OF CARE
Problem: Patient Care Overview  Goal: Plan of Care/Patient Progress Review  Outcome: No Change  Pt HR [id number], remains aferile, reports abdominal pain & has PCA dilaudid, states pain well controlled by the dilaudid & scheduled IV tylenol , NG in place, patent, brownish liquid, long in place & patent with red urine noted (350 mls out), NG patent, brownish liquid 100 mls,. Abdominal dressing c.d.i.

## 2018-03-03 NOTE — PLAN OF CARE
Problem: Patient Care Overview  Goal: Plan of Care/Patient Progress Review  Outcome: Improving  Orientation: A&Ox4  VS: Temp: 98.1  F (36.7  C) Temp src: Oral BP: 97/56 Pulse: 100 Heart Rate: 109 Resp: 18 SpO2: 96 % O2 Device: Nasal cannula Oxygen Delivery: 2 LPM  LS: clear   Cardiac: tachycardic   GI: ostomy, passing gas, NG removed   : long removed, voiding without difficulty   Skin: incision  Activity: patient walked twice in the halls, assist 1   Pain: pain managed with PCA  Lines/IV: 2 piv  Plan: continue with NPO, awaiting plan for gram positive rods from peritoneal culture

## 2018-03-03 NOTE — PROGRESS NOTES
Colon and Rectal Surgery Progress Note          Assessment and Plan:   POD #2 open left colectomy    Keep NPO  Will do NG clamping trial  Ambulate    Jorge Lan MD  Colorectal Surgery  [phone] (office)  [phone] (pager)  [email or web address]             Interval History:   Feeling OK  Passing gas into bag  Denies nausea              Physical Exam:   Vitals were reviewed  Patient Vitals for the past 24 hrs:   BP Temp Temp src Pulse Heart Rate Resp SpO2   03/03/18 0741 97/56 98.1  F (36.7  C) Oral 100 - 18 96 %   03/03/18 0100 108/62 98.6  F (37  C) Axillary - 109 16 96 %   03/02/18 1611 110/61 97  F (36.1  C) Oral - 106 18 94 %   03/02/18 1155 112/64 97.5  F (36.4  C) Oral - 110 20 91 %         Intake/Output Summary (Last 24 hours) at 03/03/18 0824  Last data filed at 03/03/18 0629   Gross per 24 hour   Intake              100 ml   Output             1475 ml   Net            -1375 ml       Head - Nomocephalic atraumatic  Sclera anicteric  Extremities warm and well perfused  Lungs - breathing non labored,   Abdomen - distended, non tender, wound dressed, stoma edematous but lots of gas in the bag  Rectal exam - deferred  Skin - no rash  Psych - affect appropriate  Neuro - no focal deficits             Data:   All laboratory and imaging data in the past 24 hours reviewed    CBC  Lab Results   Component Value Date    WBC 9.5 03/03/2018    WBC 10.5 02/27/2018    WBC 10.8 02/26/2018    HGB 11.4 (L) 03/03/2018    HGB 16.6 03/01/2018    HGB 14.8 02/27/2018    HCT 34.9 (L) 03/03/2018    HCT 44.1 02/27/2018    HCT 46.1 02/26/2018     (L) 03/03/2018     (L) 03/02/2018     02/27/2018       BMP  Recent Labs   Lab Test  03/03/18   0727  03/02/18   0737  03/01/18   1902   NA  137   --   136   POTASSIUM  3.9   --   4.7   CHLORIDE  105   --   106   CO2  27   --   23   ANIONGAP  5   --   7   GLC  95  129*  135*   BUN  13   --   12   CR  0.89   --   0.74   JANICE  7.7*   --   7.6*       Liver Function Studies -    Recent Labs   Lab Test  02/26/18   2320   PROTTOTAL  8.0   ALBUMIN  4.1   BILITOTAL  0.7   ALKPHOS  75   AST  17   ALT  15                    Medications:     Current Facility-Administered Medications Ordered in Epic   Medication Dose Route Frequency Last Rate Last Dose     bupivacaine 0.25 % - EPINEPHrine 1:200,000 (PF) injection 75 mg  30 mL Intradermal Once         lidocaine 1 % 1 mL  1 mL Other Q1H PRN         lidocaine (LMX4) kit   Topical Q1H PRN         sodium chloride (PF) 0.9% PF flush 3 mL  3 mL Intracatheter Q1H PRN         sodium chloride (PF) 0.9% PF flush 3 mL  3 mL Intracatheter Q8H         lactated ringers BOLUS 500 mL  500 mL Intravenous Q4H PRN         naloxone (NARCAN) injection 0.1-0.4 mg  0.1-0.4 mg Intravenous Q2 Min PRN         enoxaparin (LOVENOX) injection 40 mg  40 mg Subcutaneous Q24H   40 mg at 03/02/18 1204     lactated ringers infusion   Intravenous Continuous 125 mL/hr at 03/03/18 0629       ondansetron (ZOFRAN) injection 4 mg  4 mg Intravenous Q6H PRN         pantoprazole (PROTONIX) 40 mg IV push injection  40 mg Intravenous Q24H   40 mg at 03/02/18 0807     HYDROmorphone (DILAUDID) PCA 1 mg/mL OPIOID NAIVE   Intravenous Continuous         acetaminophen (OFIRMEV) infusion 1,000 mg  1,000 mg Intravenous Q6H 400 mL/hr at 03/03/18 0632 1,000 mg at 03/03/18 0632     May continue current IV fluids if patient has IV fluids infusing.   Does not apply Continuous PRN         sodium chloride (PF) 0.9% PF flush 3 mL  3 mL Intravenous q1 min prn         lidocaine 1 % 1 mL  1 mL Other Q1H PRN         lidocaine (LMX4) kit 2.5 g  2.5 g Topical Q1H PRN         sodium chloride (PF) 0.9% PF flush 3 mL  3 mL Intracatheter Q1H PRN   3 mL at 03/02/18 1910     sodium chloride (PF) 0.9% PF flush 3 mL  3 mL Intracatheter Q8H   3 mL at 03/02/18 1910     ondansetron (ZOFRAN-ODT) ODT tab 4 mg  4 mg Oral Q6H PRN         prochlorperazine (COMPAZINE) injection 10 mg  10 mg Intravenous Q6H PRN   10 mg at 02/27/18  2240    Or     prochlorperazine (COMPAZINE) tablet 10 mg  10 mg Oral Q6H PRN        Or     prochlorperazine (COMPAZINE) Suppository 25 mg  25 mg Rectal Q12H PRN         HYDROmorphone (PF) (DILAUDID) injection 0.3-0.5 mg  0.3-0.5 mg Intravenous Q2H PRN   0.5 mg at 03/01/18 1804     LORazepam (ATIVAN) tablet 0.5-1 mg  0.5-1 mg Oral Q4H PRN         LORazepam (ATIVAN) injection 0.5 mg  0.5 mg Intravenous Q6H PRN   0.5 mg at 02/28/18 0346     calcium carbonate (TUMS) chewable tablet 500 mg  500 mg Oral Daily PRN         No current Epic-ordered outpatient prescriptions on file.

## 2018-03-03 NOTE — CONSULTS
Care Transition Initial Assessment -   Reason For Consult: discharge planning  Met with: PATIENT    Active Problems:    Colon obstruction    Colon cancer (H)       DATA  Lives With: spouse  Quality Of Family Relationships: supportive, helpful, involved  Transportation Available: car, family or friend will provide    ASSESSMENT  Cognitive Status:  Alert & Orientated    Pt reports that he lives with his wife in a town home, they have 6 children who live on their own but are supportive.  Pt works full time and plans on returning to work.  He is independent, still drives, does not use any assistive devices, denies any falls.  Charge RN asked that I see pt for HC wound cares, however pt will not qualify d/t not being home bound and he reports that his wife is always home and they have been teaching him here at the hospital on how to care for his ostomy.          PLAN  Financial costs for the patient includes: unknown   Patient given options and choices for discharge: yes  Patient/family is agreeable to the plan? yes  Patient Goals and Preferences: return home  Patient anticipates discharging to: home    No needs identified at this time

## 2018-03-03 NOTE — PROGRESS NOTES
Chart check only.    Pathology report from left hemicolectomy 3/1/18 pending.    Continue post op cares per colorectal surgery. Plan follow up with Dr. Luke once pathology report finalized.     Please call if questions or concerns arise this weekend.

## 2018-03-03 NOTE — PROGRESS NOTES
[facility]  Hospitalist Progress Note  Patient Name: Gab Holloway    MRN: [id number]  Provider:  Catrina Rey MD  Date:03/03/2018      Initial presenting complaint/issue to hospital (Diagnosis): abd pain     Summary of Stay: Gab Holloway is a 57 year old healthy male who was admitted on 2/26/2018 with abdominal pain/bloating. Found to have colonic mass with partial/early obstruction suspicious for malignancy. He had flex sig on 2/28 which showed partially obstructing mass- prelim pathology showed adenocarcinoma. He is s/p open colectomy w/ colostomy on 3/1/2018 per CRS-awaiting return of bowel function. Hem/onc consulted for planning of future treatment and discussion about prognosis            Assessment and Plan:      Colonic obstruction 2/2 partially obstructing adenocarcinoma of colon s/p partial colectomy w/ colostomy on 3/1/2018. Pt presented with abd pain and e/o colonic obstruction 2/2 colonic mass.  Had GGE yesterday.Sigmoidoscopy on 2/28, likely malignant obstructing mass per prelim path after sigmoidoscopy   --appreciate CRS consult, s/p colectomy on 3/1 without any immediate complication  --will consult hem/onc  --cont NPO, IVF  --on dilaudid PCA  --NG clamping trial today, diet advancement per surgery     +ve peritoneal wash culture: growing gram +ve rods, suspect contaminant however I am not sure about its significance - ?contaminant vs leak  --defer to CRS    New dx of Adenocarcinoma of colon: oncology on board    Asymptomatic cholelithiasis.         # Pain Assessment:    Current Pain Score 3/3/2018 3/3/2018 3/3/2018   Patient currently in pain? yes yes -   Pain score (0-10) 2 2 0   Pain location Abdomen Abdomen -   Pain descriptors - Aching -   - Gab is experiencing pain due to colonic obstruction. Pain management was discussed and the plan was created in a collaborative fashion.  Gab's response to the current recommendations: engaged  - Please see the plan for  "pain management as documented above    DVT Prophylaxis:  - -lovenox post op   Code Status: Full Code  Discharge Dispo: home  Estimated Disch Date / # of Days until Discharge: TBD-pending return of bowel function         Interval History:      Pt feels ok-denies pain  Passing gas through stoma  No questions     Explained plan for surgery and expected recovery        Data reviewed today: I reviewed all new labs and imaging reports over the last 24 hours. I personally reviewed no images or EKG's today.         Physical Exam:      Last Vital Signs:  BP 97/56 (BP Location: Left arm)  Pulse 100  Temp 98.1  F (36.7  C) (Oral)  Resp 18  Ht 1.778 m (5' 10\")  Wt 91.2 kg (201 lb 1.6 oz)  SpO2 96%  BMI 28.85 kg/m2  GENERAL: No apparent distress. Awake, alert, and fully oriented.  HEENT: Normocephalic, atraumatic. Extraocular movements intact.  CARDIOVASCULAR: Regular rate and rhythm without murmurs or rubs. No S3.  PULMONARY: Clear bilaterally.  ABDOMINAL: Soft, non-tender,  distended. Bowel sounds absent. No hepatosplenomegaly.  EXTREMITIES: No cyanosis or clubbing. No edema.  NEUROLOGICAL: CN 2-12 grossly intact, no focal neurological deficits.  DERMATOLOGICAL: No rash, ulcer, ecchymoses, jaundice.  PSYCH: appropriate           Medications:      All current medications were reviewed.         Data:      All new lab and imaging data was reviewed.   Data       Recent Labs  Lab 03/03/18  0727 03/02/18  0737 03/01/18 1902 02/27/18  0545 02/26/18  2320   WBC 9.5  --   --  10.5 10.8   HGB 11.4*  --  16.6 14.8 15.8   HCT 34.9*  --   --  44.1 46.1   *  --   --  99 97   * 126*  --  212 243       Recent Labs  Lab 03/03/18  0727 03/02/18  0737 03/01/18  1902 02/28/18  0636     --  136 138   POTASSIUM 3.9  --  4.7 4.0   CHLORIDE 105  --  106 106   CO2 27  --  23 26   ANIONGAP 5  --  7 6   GLC 95 129* 135* 124*   BUN 13  --  12 14   CR 0.89  --  0.74 0.89   GFRESTIMATED 87  --  >90 88   GFRESTBLACK >90  --  >90 " >90   JANICE 7.7*  --  7.6* 8.4*       No results found for this or any previous visit (from the past 24 hour(s)).    Catrina Rey MD  Hospitalist  [facility] [address] [place] [address]

## 2018-03-03 NOTE — PLAN OF CARE
Problem: Patient Care Overview  Goal: Plan of Care/Patient Progress Review  Outcome: Improving  Pt A&O x 4, HR tachy @ 109, remains afebrile, O2 @ 2 lpm with  Sats >95%, states pain is well controlled by PCA dilaudid, shift total of 2.4 mg, scheduled IV tylenol administered per orders, NG patent with 100 mls brownish liquid output, Stoma red & protruding, Ostomy 100 mls red liquid, long in place patent with 350 mls red urine out. Dressing on abdominal incision remains C.D.I.

## 2018-03-03 NOTE — PLAN OF CARE
Problem: Patient Care Overview  Goal: Plan of Care/Patient Progress Review  Outcome: Improving  Vitals stable and oxygen sats 90's with oxygen per nc. Ng and colostomy and long patent. Pain eased with PCA dilaudid. Up out of bed with assist of 2, tolerated well. Incision clean, dry and intact. Scant shadowing.

## 2018-03-04 LAB
ANION GAP SERPL CALCULATED.3IONS-SCNC: 4 MMOL/L (ref 3–14)
BUN SERPL-MCNC: 13 MG/DL (ref 7–30)
CALCIUM SERPL-MCNC: 7.9 MG/DL (ref 8.5–10.1)
CHLORIDE SERPL-SCNC: 106 MMOL/L (ref 94–109)
CO2 SERPL-SCNC: 28 MMOL/L (ref 20–32)
CREAT SERPL-MCNC: 0.7 MG/DL (ref 0.66–1.25)
ERYTHROCYTE [DISTWIDTH] IN BLOOD BY AUTOMATED COUNT: 15 % (ref 10–15)
GFR SERPL CREATININE-BSD FRML MDRD: >90 ML/MIN/1.7M2
GLUCOSE SERPL-MCNC: 82 MG/DL (ref 70–99)
HCT VFR BLD AUTO: 33.5 % (ref 40–53)
HGB BLD-MCNC: 10.8 G/DL (ref 13.3–17.7)
MCH RBC QN AUTO: 32.5 PG (ref 26.5–33)
MCHC RBC AUTO-ENTMCNC: 32.2 G/DL (ref 31.5–36.5)
MCV RBC AUTO: 101 FL (ref 78–100)
PLATELET # BLD AUTO: 127 10E9/L (ref 150–450)
POTASSIUM SERPL-SCNC: 3.6 MMOL/L (ref 3.4–5.3)
RBC # BLD AUTO: 3.32 10E12/L (ref 4.4–5.9)
SODIUM SERPL-SCNC: 138 MMOL/L (ref 133–144)
WBC # BLD AUTO: 9.4 10E9/L (ref 4–11)

## 2018-03-04 PROCEDURE — 99207 ZZC NON-BILLABLE SERV PER CHARTING: CPT | Performed by: INTERNAL MEDICINE

## 2018-03-04 PROCEDURE — 25000128 H RX IP 250 OP 636: Performed by: COLON & RECTAL SURGERY

## 2018-03-04 PROCEDURE — 36415 COLL VENOUS BLD VENIPUNCTURE: CPT | Performed by: HOSPITALIST

## 2018-03-04 PROCEDURE — 80048 BASIC METABOLIC PNL TOTAL CA: CPT | Performed by: HOSPITALIST

## 2018-03-04 PROCEDURE — 85027 COMPLETE CBC AUTOMATED: CPT | Performed by: HOSPITALIST

## 2018-03-04 PROCEDURE — 25000125 ZZHC RX 250: Performed by: COLON & RECTAL SURGERY

## 2018-03-04 PROCEDURE — 12000000 ZZH R&B MED SURG/OB

## 2018-03-04 RX ADMIN — ACETAMINOPHEN 1000 MG: 10 INJECTION, SOLUTION INTRAVENOUS at 14:08

## 2018-03-04 RX ADMIN — ACETAMINOPHEN 1000 MG: 10 INJECTION, SOLUTION INTRAVENOUS at 02:55

## 2018-03-04 RX ADMIN — SODIUM CHLORIDE, POTASSIUM CHLORIDE, SODIUM LACTATE AND CALCIUM CHLORIDE: 600; 310; 30; 20 INJECTION, SOLUTION INTRAVENOUS at 13:38

## 2018-03-04 RX ADMIN — ENOXAPARIN SODIUM 40 MG: 40 INJECTION SUBCUTANEOUS at 12:18

## 2018-03-04 RX ADMIN — SODIUM CHLORIDE, POTASSIUM CHLORIDE, SODIUM LACTATE AND CALCIUM CHLORIDE: 600; 310; 30; 20 INJECTION, SOLUTION INTRAVENOUS at 21:05

## 2018-03-04 RX ADMIN — SODIUM CHLORIDE, POTASSIUM CHLORIDE, SODIUM LACTATE AND CALCIUM CHLORIDE: 600; 310; 30; 20 INJECTION, SOLUTION INTRAVENOUS at 06:11

## 2018-03-04 RX ADMIN — PANTOPRAZOLE SODIUM 40 MG: 40 INJECTION, POWDER, FOR SOLUTION INTRAVENOUS at 08:18

## 2018-03-04 RX ADMIN — ACETAMINOPHEN 1000 MG: 10 INJECTION, SOLUTION INTRAVENOUS at 21:03

## 2018-03-04 RX ADMIN — HYDROMORPHONE HYDROCHLORIDE: 10 INJECTION, SOLUTION INTRAMUSCULAR; INTRAVENOUS; SUBCUTANEOUS at 15:09

## 2018-03-04 RX ADMIN — ACETAMINOPHEN 1000 MG: 10 INJECTION, SOLUTION INTRAVENOUS at 08:19

## 2018-03-04 ASSESSMENT — PAIN DESCRIPTION - DESCRIPTORS: DESCRIPTORS: PRESSURE

## 2018-03-04 ASSESSMENT — ACTIVITIES OF DAILY LIVING (ADL)
ADLS_ACUITY_SCORE: 11

## 2018-03-04 NOTE — PLAN OF CARE
Problem: Patient Care Overview  Goal: Plan of Care/Patient Progress Review  Outcome: Improving  Orientation: A&Ox4  VS: Temp: 97.8  F (36.6  C) Temp src: Oral BP: 125/69   Heart Rate: 86 Resp: 16 SpO2: 98 % O2 Device: Nasal cannula Oxygen Delivery: 2 LPM  LS: clear   Cardiac: intact   GI: ostomy, passing gas  : voiding without difficulty  Skin: abdominal incision   Activity: SBA, walked twice in the halls   Pain: pain managed with PCA    Lines/IV: 2 piv   Plan: continue with NPO

## 2018-03-04 NOTE — PROGRESS NOTES
Colon and Rectal Surgery Progress Note          Assessment and Plan:   POD #3  Left colectomy with stoma    Keep npo except ice chips/meds  Suspect peritoneal culture is either contaminant or translocation from obstruction.  In either scenario, doubt it requires treatment.  Ambulate    Jorge Lan MD  Colorectal Surgery  [phone] (office)  [phone] (pager)  [email or web address]             Interval History:   Doing well.  Pain controlled.  Some burping.              Physical Exam:   Vitals were reviewed  Patient Vitals for the past 24 hrs:   BP Temp Temp src Heart Rate Resp SpO2   03/04/18 0725 125/69 97.8  F (36.6  C) Oral 86 16 98 %   03/04/18 0004 116/67 98.4  F (36.9  C) Oral 92 18 96 %   03/03/18 2313 - - - - 18 -   03/03/18 2115 - - - - 18 -   03/03/18 1906 - - - - 18 -   03/03/18 1700 - - - - 18 -   03/03/18 1600 - - - - 18 -   03/03/18 1530 115/64 98.3  F (36.8  C) Oral 107 18 95 %         Intake/Output Summary (Last 24 hours) at 03/04/18 0959  Last data filed at 03/04/18 0611   Gross per 24 hour   Intake              936 ml   Output             1010 ml   Net              -74 ml       Head - Nomocephalic atraumatic  Sclera anicteric  Extremities warm and well perfused  Lungs - breathing non labored,   Abdomen - distended, appropriately tender, stoma looks good and productive  Rectal exam - deferred  Skin - no rash  Psych - affect appropriate  Neuro - no focal deficits             Data:   All laboratory and imaging data in the past 24 hours reviewed    CBC  Lab Results   Component Value Date    WBC 9.4 03/04/2018    WBC 9.5 03/03/2018    WBC 10.5 02/27/2018    HGB 10.8 (L) 03/04/2018    HGB 11.4 (L) 03/03/2018    HGB 16.6 03/01/2018    HCT 33.5 (L) 03/04/2018    HCT 34.9 (L) 03/03/2018    HCT 44.1 02/27/2018     (L) 03/04/2018     (L) 03/03/2018     (L) 03/02/2018       BMP  Recent Labs   Lab Test  03/04/18   0738  03/03/18   0727   NA  138  137   POTASSIUM  3.6  3.9   CHLORIDE  106   105   CO2  28  27   ANIONGAP  4  5   GLC  82  95   BUN  13  13   CR  0.70  0.89   JANICE  7.9*  7.7*       Liver Function Studies -   Recent Labs   Lab Test  02/26/18   2320   PROTTOTAL  8.0   ALBUMIN  4.1   BILITOTAL  0.7   ALKPHOS  75   AST  17   ALT  15                    Medications:     Current Facility-Administered Medications Ordered in Epic   Medication Dose Route Frequency Last Rate Last Dose     bupivacaine 0.25 % - EPINEPHrine 1:200,000 (PF) injection 75 mg  30 mL Intradermal Once         lidocaine 1 % 1 mL  1 mL Other Q1H PRN         lidocaine (LMX4) kit   Topical Q1H PRN         sodium chloride (PF) 0.9% PF flush 3 mL  3 mL Intracatheter Q1H PRN         sodium chloride (PF) 0.9% PF flush 3 mL  3 mL Intracatheter Q8H         lactated ringers BOLUS 500 mL  500 mL Intravenous Q4H PRN         naloxone (NARCAN) injection 0.1-0.4 mg  0.1-0.4 mg Intravenous Q2 Min PRN         enoxaparin (LOVENOX) injection 40 mg  40 mg Subcutaneous Q24H   40 mg at 03/03/18 1336     lactated ringers infusion   Intravenous Continuous 125 mL/hr at 03/04/18 0611       ondansetron (ZOFRAN) injection 4 mg  4 mg Intravenous Q6H PRN         pantoprazole (PROTONIX) 40 mg IV push injection  40 mg Intravenous Q24H   40 mg at 03/04/18 0818     HYDROmorphone (DILAUDID) PCA 1 mg/mL OPIOID NAIVE   Intravenous Continuous         acetaminophen (OFIRMEV) infusion 1,000 mg  1,000 mg Intravenous Q6H 400 mL/hr at 03/04/18 0819 1,000 mg at 03/04/18 0819     May continue current IV fluids if patient has IV fluids infusing.   Does not apply Continuous PRN         sodium chloride (PF) 0.9% PF flush 3 mL  3 mL Intravenous q1 min prn         lidocaine 1 % 1 mL  1 mL Other Q1H PRN         lidocaine (LMX4) kit 2.5 g  2.5 g Topical Q1H PRN         sodium chloride (PF) 0.9% PF flush 3 mL  3 mL Intracatheter Q1H PRN   3 mL at 03/02/18 1910     sodium chloride (PF) 0.9% PF flush 3 mL  3 mL Intracatheter Q8H   3 mL at 03/02/18 1910     ondansetron (ZOFRAN-ODT)  ODT tab 4 mg  4 mg Oral Q6H PRN         prochlorperazine (COMPAZINE) injection 10 mg  10 mg Intravenous Q6H PRN   10 mg at 02/27/18 2240    Or     prochlorperazine (COMPAZINE) tablet 10 mg  10 mg Oral Q6H PRN        Or     prochlorperazine (COMPAZINE) Suppository 25 mg  25 mg Rectal Q12H PRN         HYDROmorphone (PF) (DILAUDID) injection 0.3-0.5 mg  0.3-0.5 mg Intravenous Q2H PRN   0.5 mg at 03/01/18 1804     LORazepam (ATIVAN) tablet 0.5-1 mg  0.5-1 mg Oral Q4H PRN         LORazepam (ATIVAN) injection 0.5 mg  0.5 mg Intravenous Q6H PRN   0.5 mg at 02/28/18 0346     calcium carbonate (TUMS) chewable tablet 500 mg  500 mg Oral Daily PRN         No current Epic-ordered outpatient prescriptions on file.

## 2018-03-04 NOTE — PROGRESS NOTES
[facility]  Hospitalist Progress Note  Patient Name: Gab Holloway    MRN: [id number]  Provider:  Jeet Haddad MD    Date:03/04/2018      Initial presenting complaint/issue to hospital (Diagnosis): abd pain     Summary of Stay: Gab Holloway is a 57 year old healthy male who was admitted on 2/26/2018 with abdominal pain/bloating. Found to have colonic mass with partial/early obstruction suspicious for malignancy. He had flex sig on 2/28 which showed partially obstructing mass- prelim pathology showed adenocarcinoma. He is s/p open colectomy w/ colostomy on 3/1/2018 per CRS-awaiting return of bowel function. Hem/onc consulted for planning of future treatment and discussion about prognosis.          Assessment and Plan:      Colonic obstruction 2/2 partially obstructing adenocarcinoma of colon s/p partial colectomy w/ colostomy on 3/1/2018. Pt presented with abd pain and e/o colonic obstruction 2/2 colonic mass.  Had GGE yesterday.Sigmoidoscopy on 2/28, likely malignant obstructing mass per prelim path after sigmoidoscopy   --appreciate CRS consult, s/p colectomy on 3/1 without any immediate complication  --will consult hem/onc  --cont IVF  --on dilaudid PCA  --NG already out, diet advancement per surgery     +ve peritoneal wash culture: growing gram +ve rods, suspect contaminant however I am not sure about its significance - ?contaminant vs leak  --defer to CRS, current plan appears to be no abx.    New dx of Adenocarcinoma of colon: oncology on board, will need close outpatient follow up.    Asymptomatic cholelithiasis.         # Pain Assessment:    Current Pain Score 3/4/2018 3/4/2018 3/4/2018   Patient currently in pain? - denies denies   Pain score (0-10) 4 - 0   Pain location - - -   Pain descriptors - - -   - Gab is experiencing pain due to colonic obstruction. Pain management was discussed and the plan was created in a collaborative fashion.  Gab's response to the  "current recommendations: engaged  - Please see the plan for pain management as documented above    DVT Prophylaxis:  - -lovenox post op   Code Status: Full Code  Discharge Dispo: home  Estimated Disch Date / # of Days until Discharge: TBD-pending return of bowel function.  ?2 more days.        Interval History:      Pt feels ok-denies pain  Passing gas through stoma, some stool  NG out  Gradually getting more hungry, feels he is recovering        Data reviewed today: I reviewed all new labs and imaging reports over the last 24 hours. I personally reviewed no images or EKG's today.         Physical Exam:      Last Vital Signs:  /69 (BP Location: Left arm)  Pulse 100  Temp 97.8  F (36.6  C) (Oral)  Resp 16  Ht 1.778 m (5' 10\")  Wt 91.2 kg (201 lb 1.6 oz)  SpO2 98%  BMI 28.85 kg/m2  GENERAL: No apparent distress. Awake, alert, and fully oriented.  HEENT: Normocephalic, atraumatic. Extraocular movements intact.  CARDIOVASCULAR: Regular rate and rhythm without murmurs or rubs. No S3.  PULMONARY: Clear bilaterally.  ABDOMINAL: Soft, non-tender,  distended. Bowel sounds absent. No hepatosplenomegaly.  EXTREMITIES: No cyanosis or clubbing. No edema.  NEUROLOGICAL: CN 2-12 grossly intact, no focal neurological deficits.  DERMATOLOGICAL: No rash, ulcer, ecchymoses, jaundice.  PSYCH: appropriate           Medications:      All current medications were reviewed.         Data:      All new lab and imaging data was reviewed.   Data       Recent Labs  Lab 03/04/18 0738 03/03/18 0727 03/02/18 0737 03/01/18 1902 02/27/18  0545   WBC 9.4 9.5  --   --  10.5   HGB 10.8* 11.4*  --  16.6 14.8   HCT 33.5* 34.9*  --   --  44.1   * 102*  --   --  99   * 111* 126*  --  212       Recent Labs  Lab 03/04/18  0738 03/03/18 0727 03/02/18 0737 03/01/18  1902    137  --  136   POTASSIUM 3.6 3.9  --  4.7   CHLORIDE 106 105  --  106   CO2 28 27  --  23   ANIONGAP 4 5  --  7   GLC 82 95 129* 135*   BUN 13 13  --  " 12   CR 0.70 0.89  --  0.74   GFRESTIMATED >90 87  --  >90   GFRESTBLACK >90 >90  --  >90   JANICE 7.9* 7.7*  --  7.6*       No results found for this or any previous visit (from the past 24 hour(s)).    03/04/2018  Jeet Haddad MD

## 2018-03-04 NOTE — PLAN OF CARE
Problem: Patient Care Overview  Goal: Plan of Care/Patient Progress Review  Outcome: No Change  Noted pt has increasing penile swelling at end of shift. Evening shift nurses notified.

## 2018-03-04 NOTE — PLAN OF CARE
Problem: Patient Care Overview  Goal: Plan of Care/Patient Progress Review  Outcome: Improving  Vitals stable. amb in [place] with assist of one, mostly with lines, and sat up in chair while visiting with family. Pain 2/10 with use of PCA dilaudid. Incision clean, dry and intact. Stoma wdl, patent and passing gas. Denies nausea, tolerating npo and ice chips. Voiding spontaneously.

## 2018-03-04 NOTE — PLAN OF CARE
Problem: Patient Care Overview  Goal: Plan of Care/Patient Progress Review  Outcome: Improving  Pt VSS, on 2 lpm via nc with sats >95%. Pt states PCA dilaudid is effective on pain, was up x 1 to use bathroom SBA, voided spontaneously, urine clear,tea colored. BS hypoactive, pt passing gas, stoma wdl, colostomy 30 mls of watery, brownish output, and dressing remains c,d,i.

## 2018-03-05 LAB
BACTERIA SPEC CULT: ABNORMAL
BACTERIA SPEC CULT: ABNORMAL
HGB BLD-MCNC: 13 G/DL (ref 13.3–17.7)
PLATELET # BLD AUTO: 142 10E9/L (ref 150–450)
SPECIMEN SOURCE: ABNORMAL

## 2018-03-05 PROCEDURE — 25000132 ZZH RX MED GY IP 250 OP 250 PS 637: Performed by: INTERNAL MEDICINE

## 2018-03-05 PROCEDURE — 85049 AUTOMATED PLATELET COUNT: CPT | Performed by: HOSPITALIST

## 2018-03-05 PROCEDURE — 25000128 H RX IP 250 OP 636: Performed by: COLON & RECTAL SURGERY

## 2018-03-05 PROCEDURE — 85018 HEMOGLOBIN: CPT | Performed by: PHYSICIAN ASSISTANT

## 2018-03-05 PROCEDURE — 40000903 ZZH STATISTIC WOC PT EDUCATION, 31-45 MIN

## 2018-03-05 PROCEDURE — 36415 COLL VENOUS BLD VENIPUNCTURE: CPT | Performed by: PHYSICIAN ASSISTANT

## 2018-03-05 PROCEDURE — 25000125 ZZHC RX 250: Performed by: COLON & RECTAL SURGERY

## 2018-03-05 PROCEDURE — 36415 COLL VENOUS BLD VENIPUNCTURE: CPT | Performed by: HOSPITALIST

## 2018-03-05 PROCEDURE — 25000128 H RX IP 250 OP 636: Performed by: INTERNAL MEDICINE

## 2018-03-05 PROCEDURE — 12000000 ZZH R&B MED SURG/OB

## 2018-03-05 PROCEDURE — 99207 ZZC NON-BILLABLE SERV PER CHARTING: CPT | Performed by: INTERNAL MEDICINE

## 2018-03-05 RX ORDER — ACETAMINOPHEN 325 MG/1
975 TABLET ORAL 3 TIMES DAILY
Status: DISCONTINUED | OUTPATIENT
Start: 2018-03-05 | End: 2018-03-07

## 2018-03-05 RX ADMIN — ACETAMINOPHEN 975 MG: 325 TABLET, FILM COATED ORAL at 15:56

## 2018-03-05 RX ADMIN — SODIUM CHLORIDE, POTASSIUM CHLORIDE, SODIUM LACTATE AND CALCIUM CHLORIDE: 600; 310; 30; 20 INJECTION, SOLUTION INTRAVENOUS at 05:38

## 2018-03-05 RX ADMIN — SODIUM CHLORIDE, POTASSIUM CHLORIDE, SODIUM LACTATE AND CALCIUM CHLORIDE: 600; 310; 30; 20 INJECTION, SOLUTION INTRAVENOUS at 15:56

## 2018-03-05 RX ADMIN — ACETAMINOPHEN 1000 MG: 10 INJECTION, SOLUTION INTRAVENOUS at 09:24

## 2018-03-05 RX ADMIN — ACETAMINOPHEN 1000 MG: 10 INJECTION, SOLUTION INTRAVENOUS at 03:23

## 2018-03-05 RX ADMIN — PANTOPRAZOLE SODIUM 40 MG: 40 INJECTION, POWDER, FOR SOLUTION INTRAVENOUS at 09:24

## 2018-03-05 RX ADMIN — ACETAMINOPHEN 975 MG: 325 TABLET, FILM COATED ORAL at 22:30

## 2018-03-05 RX ADMIN — ENOXAPARIN SODIUM 40 MG: 40 INJECTION SUBCUTANEOUS at 12:08

## 2018-03-05 ASSESSMENT — ACTIVITIES OF DAILY LIVING (ADL)
ADLS_ACUITY_SCORE: 11

## 2018-03-05 NOTE — PROGRESS NOTES
COLON & RECTAL SURGERY  PROGRESS NOTE    March 5, 2018  Post-op Day # 4 left colectomy with transverse colostomy for obstructing cancer    SUBJECTIVE:  Pt doing well this morning, hopeful to be able to eat a little something. He describes discomfort prior to passing gas via stoma. He denies nausea, burping improved. He is ambulating often and urinating without difficulty.     OBJECTIVE:  Temp:  [96.5  F (35.8  C)-100.2  F (37.9  C)] 98.3  F (36.8  C)  Heart Rate:  [93-95] 95  Resp:  [16-18] 18  BP: (130)/(73-75) 130/73  SpO2:  [94 %-96 %] 94 %    Intake/Output Summary (Last 24 hours) at 03/05/18 0803  Last data filed at 03/05/18 0708   Gross per 24 hour   Intake             4575 ml   Output             1175 ml   Net             3400 ml       GENERAL:  Awake, alert, no acute distress, lying in bed  HEAD: Nomocephalic atraumatic  SCLERA: anicteric  EXTREMITIES: warm and well perfused  ABDOMEN:  Soft, appropriately tender, non-distended, no rebound or guarding, no peritoneal signs. Colostomy pink and viable with brown semi-formed stool in bag.   INCISION:  C/d/i, staples without erythema, induration, or fluctuance    LABS:  Lab Results   Component Value Date    WBC 9.4 03/04/2018     Lab Results   Component Value Date    HGB 10.8 03/04/2018     Lab Results   Component Value Date    HCT 33.5 03/04/2018     Lab Results   Component Value Date     03/05/2018     Last Basic Metabolic Panel:  Lab Results   Component Value Date     03/04/2018      Lab Results   Component Value Date    POTASSIUM 3.6 03/04/2018     Lab Results   Component Value Date    CHLORIDE 106 03/04/2018     Lab Results   Component Value Date    JANICE 7.9 03/04/2018     Lab Results   Component Value Date    CO2 28 03/04/2018     Lab Results   Component Value Date    BUN 13 03/04/2018     Lab Results   Component Value Date    CR 0.70 03/04/2018     Lab Results   Component Value Date    GLC 82 03/04/2018       ASSESSMENT/PLAN: POD 4 left colectomy  with transverse colostomy for obstructing cancer. Pt with Tmax 100.2 overnight, resolved this morning, remains otherwise vitally stable.    1. Advance to gentle clear liquid diet  2. Lovenox for prophylaxis  3. Continue PCA dilaudid and IV tylenol   4. Decrease IVF  5. Encourage OOB, ambulate    Christina Gordon PA-C  [facility]  Phone: [phone]   Colon and Rectal Surgery Attending Note    Patient seen and examined independently.  Agree with above assessment and plan.  Feeling better, + flatus and stool per stoma  abd firm round, expected tenderness, incision CDI with staples, stoma with brown output.  Plan as above  Clears.  Lovenox teaching for discharge.  Await pathology.    Joann Delcid MD  [facility]  Office Phone # [phone]        CRS Update  Notified of concerns of bleeding from mucous membrane of stoma and mucocutaneous junction that was controlled with pressure during appliance change with [facility] nurse this afternoon.     Plan:  - Recheck hgb  - Continue lovenox for prophylaxis  - Ordered hgb for the morning    Christina Gordon PA-C  [facility]  [phone]

## 2018-03-05 NOTE — PROGRESS NOTES
Oncology/Hematology Follow Up Note:    Assessment and Plan:  #1 Obstructive left-sided colon cancer, s/p left hemicolectomy     It was my pleasure to see the patient in the hospital today.  He underwent left hemicolectomy for his newly diagnosed obstructive left-sided colon cancer last Thursday.  Pathology report is currently still pending.     I previously informed the patient that if he is found to have lymph node positive disease (stage III), I would strongly recommend a course of adjuvant chemotherapy to decrease the risk of recurrence.  If his disease is lymph node negative, we would need to assess the risk factors, including extent, grade, and MSI status of the tumor to determine the need for adjuvant chemotherapy.  He already has one adverse risk factor, which is the obstructive nature of the tumor.     PLAN:  1. Follow up on pathology report  2. Will arrange follow-up as an outpatient     Raj Luke M.D.  [facility]  [phone]    Subjective:    Patient was doing relatively well this morning.  Surgical site pain has improved.  Continues to describe abdominal discomfort prior to passing gas via stoma.  No nausea or vomiting.  Will be advanced to clear liquid diet today.    Scheduled Medications:  Reviewed active medications    Labs:  CBC RESULTS:   Recent Labs   Lab Test  03/05/18   0657  03/04/18   0738  03/03/18   0727   03/01/18   1902  02/27/18   0545   WBC   --   9.4  9.5   --    --   10.5   HGB   --   10.8*  11.4*   --   16.6  14.8   HCT   --   33.5*  34.9*   --    --   44.1   MCV   --   101*  102*   --    --   99   PLT  142*  127*  111*   < >   --   212    < > = values in this interval not displayed.       CMP  Recent Labs  Lab 03/04/18  0738 03/03/18  0727 03/02/18  0737 03/01/18  1902 02/28/18  0636  02/26/18  2320    137  --  136 138  < > 137   POTASSIUM 3.6 3.9  --  4.7 4.0  < > 3.6   CHLORIDE 106 105  --  106 106  < > 104   CO2 28 27  --  23 26  < > 24   ANIONGAP 4 5  --  7 6  < >  "9   GLC 82 95 129* 135* 124*  < > 115*   BUN 13 13  --  12 14  < > 16   CR 0.70 0.89  --  0.74 0.89  < > 0.94   GFRESTIMATED >90 87  --  >90 88  < > 83   GFRESTBLACK >90 >90  --  >90 >90  < > >90   JANICE 7.9* 7.7*  --  7.6* 8.4*  < > 9.5   PROTTOTAL  --   --   --   --   --   --  8.0   ALBUMIN  --   --   --   --   --   --  4.1   BILITOTAL  --   --   --   --   --   --  0.7   ALKPHOS  --   --   --   --   --   --  75   AST  --   --   --   --   --   --  17   ALT  --   --   --   --   --   --  15   < > = values in this interval not displayed.    INR  Recent Labs  Lab 02/28/18  0636   INR 1.05       Objective/Physical Exam:  Blood pressure 136/76, pulse 100, temperature 97.1  F (36.2  C), temperature source Oral, resp. rate 20, height 1.778 m (5' 10\"), weight 91.2 kg (201 lb 1.6 oz), SpO2 94 %.  General: Awake, alert, and oriented.  Skin:  No bruising or skin rash noted.  Eyes:   Sclera anicteric.    Mouth:  Moist mucous membranes without ulceration  Abdomen:  Soft, nondistended.  Soft dark brown stool in colostomy bag.  Extremities:  Well perfused, no edema.    Raj Luke MD  [facility]  3/5/2018 10:04 AM        "

## 2018-03-05 NOTE — PROVIDER NOTIFICATION
Paged both MD Boo and MD Farhana about bleeding from ostomy during dressing change. Please read wound care nurse's note for more information.

## 2018-03-05 NOTE — CONSULTS
"CLINICAL NUTRITION SERVICES  -  ASSESSMENT NOTE      Malnutrition: Does not meet criteria at this time.        REASON FOR ASSESSMENT  Gab Holloway is a 57 year old male seen by Registered Dietitian for LOS (previously with low-fiber diet education).      NUTRITION HISTORY  - Information obtained from patient and chart.  - Regular diet at baseline.  Reports he has somewhat changed his eating habits over the past 1 year to intentionally induce weight loss.  \"Trying to watch portions\" also describes increasing exercise.  - Typically consumes meals BID.  Works evenings/overnights.  On days in which he works he consumes an afternoon meal and then another meal at work.  On weekends he tends to consume \"brunch\" and an evening meal.   - NKFA.      CURRENT NUTRITION ORDERS  Diet Order:     Clears    Current Intake/Tolerance:  NPO/clears x 7 days since admission.  Advanced to clears per CRS this AM.  Has taken water and apple juice thus far.  Initially he reports an increase in nausea and a feeling of fullness which resolved after walking.       PHYSICAL FINDINGS  Observed  No fat or muscle loss observed  Obtained from Chart/Interdisciplinary Team  Open L partial colectomy with colostomy placement 3/1 (r/t new colon CA)  Minimal colostomy output yesterday, 100 ml output thus far today    ANTHROPOMETRICS  Height: 5' 10\"  Weight: 91.4 kg (201#)  Body mass index is 28.85 kg/(m^2).  Weight Status:  Overweight BMI 25-29.9  IBW: 75.5 kg (166#)  % IBW: 121%  Weight History:  Wt Readings from Last 10 Encounters:   02/27/18 91.2 kg (201 lb 1.6 oz)   - Patient reports weighing 210-214# 1 year ago.  He reports intentionally losing ~10# over the past 1 year.      LABS  Labs reviewed    MEDICATIONS  Medications reviewed    Dosing Weight 79.5 kg - adjusted weight     ASSESSED NUTRITION NEEDS PER APPROVED PRACTICE GUIDELINES:  Estimated Energy Needs: 4093-5701 kcals (25-30 Kcal/Kg)  Justification: maintenance  Estimated Protein Needs: "  grams protein (1.2-1.5 g pro/Kg)  Justification: post-op  Estimated Fluid Needs: 8310-7843 mL (1 mL/Kcal)  Justification: maintenance and per provider pending fluid status    MALNUTRITION:  % Weight Loss:  Weight loss does not meet criteria for malnutrition   % Intake:  </= 50% for >/= 5 days (severe malnutrition)  Subcutaneous Fat Loss:  None observed  Muscle Loss:  None observed  Fluid Retention:  None noted    Malnutrition Diagnosis: Patient does not meet two of the above criteria necessary for diagnosing malnutrition    NUTRITION DIAGNOSIS:  Inadequate oral intake related to altered GI function post-op as evidenced by meeting <50% needs x 7 days since admission, diet advancement to clears w/in the past 24 hrs.       NUTRITION INTERVENTIONS  Recommendations / Nutrition Prescription  Diet per CRS.  Will follow-up for low-fiber diet education with diet advancement as able.     Addition of Ensure clear supplement to trial.       Implementation  Nutrition education: Provided education on diet per MD, addition of Ensure clear option.    Medical Food Supplement: As above.     Collaboration and Referral of Nutrition care: Discussed POC with team during rounds.    Nutrition Goals  Diet advancement past clears w/in 48 hrs.      MONITORING AND EVALUATION:  Progress towards goals will be monitored and evaluated per protocol and Practice Guidelines        Geovanna Vu RD, LD  Clinical Dietitian  3rd floor/ICU: [phone]  All other floors: [phone]  Weekend/holiday: [phone]

## 2018-03-05 NOTE — PROGRESS NOTES
[facility]  Hospitalist Progress Note  Patient Name: Gab Holloway    MRN: [id number]  Provider:  Jeet Haddad MD    Date:03/05/2018      Initial presenting complaint/issue to hospital (Diagnosis): abd pain     Summary of Stay: Gab Holloway is a 57 year old healthy male who was admitted on 2/26/2018 with abdominal pain/bloating. Found to have colonic mass with partial/early obstruction suspicious for malignancy. He had flex sig on 2/28 which showed partially obstructing mass- prelim pathology showed adenocarcinoma. He is s/p open colectomy w/ colostomy on 3/1/2018 per CRS-awaiting return of bowel function. Hem/onc consulted for planning of future treatment and discussion about prognosis.  He now is slowly advancing his diet and having return of bowel function.          Assessment and Plan:      Colonic obstruction 2/2 partially obstructing adenocarcinoma of colon s/p partial colectomy w/ colostomy on 3/1/2018. Pt presented with abd pain and e/o colonic obstruction 2/2 colonic mass.  Had GGE yesterday.Sigmoidoscopy on 2/28, likely malignant obstructing mass per prelim path after sigmoidoscopy   --appreciate CRS consult, s/p colectomy on 3/1 without any immediate complication  --will consult hem/onc  --cont IVF  --on dilaudid PCA, management deferred to Surgery  --NG already out, diet advancement per surgery, on clears    +ve peritoneal wash culture: growing gram +ve rods, suspect contaminant however I am not sure about its significance - ?contaminant vs leak  --defer to CRS, current plan appears to be no abx.    New dx of Adenocarcinoma of colon: oncology on board, will need close outpatient follow up.    Asymptomatic cholelithiasis.         # Pain Assessment:  Managed by surgical team.    DVT Prophylaxis:  - -lovenox post op   Code Status: Full Code  Discharge Dispo: home  Estimated Disch Date / # of Days until Discharge: TBD-pending return of bowel function.  ?1-2 more  "days.        Interval History:      pain  Passing gas through stoma, some stool  Advanced to clears per CRS  Gradually getting more hungry, feels he is recovering  Reduced fluids to 50 cc/hr, ok to saline lock later if good intake of clears        Data reviewed today: I reviewed all new labs and imaging reports over the last 24 hours. I personally reviewed no images or EKG's today.         Physical Exam:      Last Vital Signs:  /76 (BP Location: Left arm)  Pulse 100  Temp 97.1  F (36.2  C) (Oral)  Resp 20  Ht 1.778 m (5' 10\")  Wt 91.2 kg (201 lb 1.6 oz)  SpO2 94%  BMI 28.85 kg/m2  GENERAL: No apparent distress. Awake, alert, and fully oriented.  HEENT: Normocephalic, atraumatic. Extraocular movements intact.  CARDIOVASCULAR: Regular rate and rhythm without murmurs or rubs. No S3.  PULMONARY: Clear bilaterally.  ABDOMINAL: Soft, non-tender,  distended. Bowel sounds absent. No hepatosplenomegaly.  EXTREMITIES: No cyanosis or clubbing. No edema.  NEUROLOGICAL: CN 2-12 grossly intact, no focal neurological deficits.  DERMATOLOGICAL: No rash, ulcer, ecchymoses, jaundice.  PSYCH: appropriate           Medications:      All current medications were reviewed.         Data:      All new lab and imaging data was reviewed.   Data       Recent Labs  Lab 03/05/18  0657 03/04/18 0738 03/03/18 0727 03/01/18 1902 02/27/18  0545   WBC  --  9.4 9.5  --   --  10.5   HGB  --  10.8* 11.4*  --  16.6 14.8   HCT  --  33.5* 34.9*  --   --  44.1   MCV  --  101* 102*  --   --  99   * 127* 111*  < >  --  212   < > = values in this interval not displayed.    Recent Labs  Lab 03/04/18 0738 03/03/18 0727 03/02/18  0737 03/01/18 1902    137  --  136   POTASSIUM 3.6 3.9  --  4.7   CHLORIDE 106 105  --  106   CO2 28 27  --  23   ANIONGAP 4 5  --  7   GLC 82 95 129* 135*   BUN 13 13  --  12   CR 0.70 0.89  --  0.74   GFRESTIMATED >90 87  --  >90   GFRESTBLACK >90 >90  --  >90   JANICE 7.9* 7.7*  --  7.6*       No results " found for this or any previous visit (from the past 24 hour(s)).    03/05/2018  Jeet Haddad MD

## 2018-03-05 NOTE — PLAN OF CARE
Problem: Patient Care Overview  Goal: Plan of Care/Patient Progress Review  Outcome: Improving  Patient was admitted for colon mass removal and ostomy placement. VSS on RA. Ostomy stoma bag was replacement. Blood noted during bag change, read WOC noted for more details. Pain controlled by IV Dilaudid. LR running at 50 ml/hr. IV tylenol was changed to PO. Advanced to clear diet. Assist of 1. Up to chair once and walked in halls. Supplies for WOC given to pt. Continue plan of care.

## 2018-03-05 NOTE — PLAN OF CARE
Problem: Patient Care Overview  Goal: Plan of Care/Patient Progress Review  Outcome: No Change  Pt had a low grade fever @ start of shift, 0300 Temp @ 98.3 O, denies any pain, states PCA dilaudid effective, BS hypoactive, watery stool noted in colostomy bag 100 mls, penile edema appears to be resolving, pt reports minimal discomfort to site. Pt up x 1 to use bathroom

## 2018-03-05 NOTE — PROGRESS NOTES
"[facility] Nurse Inpatient Ostomy Assessment      Assessment of ostomy and needs for:  Ostomy needs Colostomy      Data:   History:      Per MD note(s): Summary of Stay: Gab Holloway is a 57 year old healthy male who was admitted on 2/26/2018 with abdominal pain/bloating. Found to have colonic mass with partial/early obstruction suspicious for malignancy. He had flex sig on 2/28 which showed partially obstructing mass- prelim pathology showed adenocarcinoma. He is s/p open colectomy w/ colostomy on 3/1/2018 per CRS-awaiting return of bowel function. Hem/onc consulted for planning of future treatment and discussion about prognosis.     Type of ostomy:  Colostomy--RRC had fallen out and in the pouch  Stoma assessment:   ? Size of stoma:  2\",  beefy red, moist, edematous, protruberant and bleeding and not able to apply pressure to totally stop the bleeding  ? A bridge in place?: No  Mucocutaneous Junction (MCJ):  intact  Peristomal skin:  intact  Abdominal assessment: slightly distended  ? N/G still in place?:  No  Output:  small, liquid and brown,      I/O last 3 completed shifts:  In: 4575 [P.O.:85; I.V.:4490]  Out: 875 [Urine:650; Stool:225]        Diet:             Active Diet Order      Diet      Clear Liquid Diet  Labs:   Recent Labs   Lab Test  03/04/18   0738   02/28/18   0636   02/26/18   2320   ALBUMIN   --    --    --    --   4.1   HGB  10.8*   < >   --    < >  15.8   INR   --    --   1.05   --    --            Intervention:     Patient's chart evaluated.      Assessments done today:  Pouch change done    Education: pt. States ;hes's not up to any teaching, given the bleeding from the stoma, will attempt to teach later this week    Prepared for discharge by: not today    Pt registered for ostomy supply samples? No:          Assessment:     Stoma assessment: healthy, beefy red, edematous and bleeding noted, pressure applied and finally stopped after 10 minutes with large gel " clot    Learning needs/ comprehension: will need teaching, observed only today    Effectiveness of current pouching/ supply plan: TBD         Plan:     Plan:   ? Current pouching system: [place] one piece flat pouch  ?  No discharge preparations started,   Pt  Had bleeding episode from stoma and stated he was not up for it.  Dr. Delcid notified  o Do [facility] Nurse recommend home care yes?   Follow up tomorrow  Face to face 40 minutes

## 2018-03-06 ENCOUNTER — APPOINTMENT (OUTPATIENT)
Dept: GENERAL RADIOLOGY | Facility: CLINIC | Age: 58
DRG: 329 | End: 2018-03-06
Attending: INTERNAL MEDICINE
Payer: COMMERCIAL

## 2018-03-06 ENCOUNTER — APPOINTMENT (OUTPATIENT)
Dept: CT IMAGING | Facility: CLINIC | Age: 58
DRG: 329 | End: 2018-03-06
Attending: INTERNAL MEDICINE
Payer: COMMERCIAL

## 2018-03-06 LAB
ANION GAP SERPL CALCULATED.3IONS-SCNC: 7 MMOL/L (ref 3–14)
BACTERIA SPEC CULT: ABNORMAL
BACTERIA SPEC CULT: ABNORMAL
BASE EXCESS BLDV CALC-SCNC: 3 MMOL/L
BASOPHILS # BLD AUTO: 0 10E9/L (ref 0–0.2)
BASOPHILS NFR BLD AUTO: 0.5 %
BUN SERPL-MCNC: 11 MG/DL (ref 7–30)
CALCIUM SERPL-MCNC: 8 MG/DL (ref 8.5–10.1)
CHLORIDE SERPL-SCNC: 103 MMOL/L (ref 94–109)
CO2 SERPL-SCNC: 28 MMOL/L (ref 20–32)
COPATH REPORT: NORMAL
CREAT SERPL-MCNC: 0.58 MG/DL (ref 0.66–1.25)
DIFFERENTIAL METHOD BLD: ABNORMAL
EOSINOPHIL # BLD AUTO: 0 10E9/L (ref 0–0.7)
EOSINOPHIL NFR BLD AUTO: 0.6 %
ERYTHROCYTE [DISTWIDTH] IN BLOOD BY AUTOMATED COUNT: 14.4 % (ref 10–15)
GFR SERPL CREATININE-BSD FRML MDRD: >90 ML/MIN/1.7M2
GLUCOSE SERPL-MCNC: 113 MG/DL (ref 70–99)
GRAM STN SPEC: ABNORMAL
HCO3 BLDV-SCNC: 26 MMOL/L (ref 21–28)
HCT VFR BLD AUTO: 38.6 % (ref 40–53)
HGB BLD-MCNC: 13.5 G/DL (ref 13.3–17.7)
HGB BLD-MCNC: 14 G/DL (ref 13.3–17.7)
IMM GRANULOCYTES # BLD: 0.1 10E9/L (ref 0–0.4)
IMM GRANULOCYTES NFR BLD: 1.1 %
LACTATE BLD-SCNC: 1.5 MMOL/L (ref 0.7–2)
LYMPHOCYTES # BLD AUTO: 0.8 10E9/L (ref 0.8–5.3)
LYMPHOCYTES NFR BLD AUTO: 12.5 %
Lab: ABNORMAL
MAGNESIUM SERPL-MCNC: 1.7 MG/DL (ref 1.6–2.3)
MCH RBC QN AUTO: 33 PG (ref 26.5–33)
MCHC RBC AUTO-ENTMCNC: 35 G/DL (ref 31.5–36.5)
MCV RBC AUTO: 94 FL (ref 78–100)
MONOCYTES # BLD AUTO: 0.7 10E9/L (ref 0–1.3)
MONOCYTES NFR BLD AUTO: 11.1 %
NEUTROPHILS # BLD AUTO: 4.9 10E9/L (ref 1.6–8.3)
NEUTROPHILS NFR BLD AUTO: 74.2 %
NRBC # BLD AUTO: 0 10*3/UL
NRBC BLD AUTO-RTO: 0 /100
O2/TOTAL GAS SETTING VFR VENT: ABNORMAL %
OXYHGB MFR BLDV: 86 %
PCO2 BLDV: 36 MM HG (ref 40–50)
PH BLDV: 7.48 PH (ref 7.32–7.43)
PLATELET # BLD AUTO: 193 10E9/L (ref 150–450)
PO2 BLDV: 51 MM HG (ref 25–47)
POTASSIUM SERPL-SCNC: 3.3 MMOL/L (ref 3.4–5.3)
POTASSIUM SERPL-SCNC: 3.3 MMOL/L (ref 3.4–5.3)
PROCALCITONIN SERPL-MCNC: 2.98 NG/ML
RBC # BLD AUTO: 4.09 10E12/L (ref 4.4–5.9)
SODIUM SERPL-SCNC: 138 MMOL/L (ref 133–144)
SPECIMEN SOURCE: ABNORMAL
SPECIMEN SOURCE: ABNORMAL
WBC # BLD AUTO: 6.5 10E9/L (ref 4–11)

## 2018-03-06 PROCEDURE — 99207 ZZC CDG-MDM COMPONENT: MEETS LOW - DOWN CODED: CPT | Performed by: INTERNAL MEDICINE

## 2018-03-06 PROCEDURE — 25000128 H RX IP 250 OP 636: Performed by: HOSPITALIST

## 2018-03-06 PROCEDURE — 82805 BLOOD GASES W/O2 SATURATION: CPT | Performed by: INTERNAL MEDICINE

## 2018-03-06 PROCEDURE — 99232 SBSQ HOSP IP/OBS MODERATE 35: CPT | Performed by: INTERNAL MEDICINE

## 2018-03-06 PROCEDURE — 85025 COMPLETE CBC W/AUTO DIFF WBC: CPT | Performed by: INTERNAL MEDICINE

## 2018-03-06 PROCEDURE — 85018 HEMOGLOBIN: CPT | Performed by: PHYSICIAN ASSISTANT

## 2018-03-06 PROCEDURE — 36569 INSJ PICC 5 YR+ W/O IMAGING: CPT

## 2018-03-06 PROCEDURE — 87040 BLOOD CULTURE FOR BACTERIA: CPT | Performed by: INTERNAL MEDICINE

## 2018-03-06 PROCEDURE — 25000125 ZZHC RX 250: Performed by: COLON & RECTAL SURGERY

## 2018-03-06 PROCEDURE — 83605 ASSAY OF LACTIC ACID: CPT | Performed by: INTERNAL MEDICINE

## 2018-03-06 PROCEDURE — 80048 BASIC METABOLIC PNL TOTAL CA: CPT | Performed by: INTERNAL MEDICINE

## 2018-03-06 PROCEDURE — 84132 ASSAY OF SERUM POTASSIUM: CPT | Performed by: INTERNAL MEDICINE

## 2018-03-06 PROCEDURE — 25000132 ZZH RX MED GY IP 250 OP 250 PS 637: Performed by: INTERNAL MEDICINE

## 2018-03-06 PROCEDURE — 83735 ASSAY OF MAGNESIUM: CPT | Performed by: INTERNAL MEDICINE

## 2018-03-06 PROCEDURE — 71260 CT THORAX DX C+: CPT

## 2018-03-06 PROCEDURE — 87186 SC STD MICRODIL/AGAR DIL: CPT | Performed by: INTERNAL MEDICINE

## 2018-03-06 PROCEDURE — 87077 CULTURE AEROBIC IDENTIFY: CPT | Performed by: INTERNAL MEDICINE

## 2018-03-06 PROCEDURE — 25000128 H RX IP 250 OP 636: Performed by: COLON & RECTAL SURGERY

## 2018-03-06 PROCEDURE — 12000000 ZZH R&B MED SURG/OB

## 2018-03-06 PROCEDURE — 74018 RADEX ABDOMEN 1 VIEW: CPT

## 2018-03-06 PROCEDURE — 87070 CULTURE OTHR SPECIMN AEROBIC: CPT | Performed by: INTERNAL MEDICINE

## 2018-03-06 PROCEDURE — 36415 COLL VENOUS BLD VENIPUNCTURE: CPT | Performed by: PHYSICIAN ASSISTANT

## 2018-03-06 PROCEDURE — 25000128 H RX IP 250 OP 636: Performed by: PHYSICIAN ASSISTANT

## 2018-03-06 PROCEDURE — 25000128 H RX IP 250 OP 636: Performed by: INTERNAL MEDICINE

## 2018-03-06 PROCEDURE — 25000125 ZZHC RX 250: Performed by: INTERNAL MEDICINE

## 2018-03-06 PROCEDURE — 40000275 ZZH STATISTIC RCP TIME EA 10 MIN

## 2018-03-06 PROCEDURE — 40000902 ZZH STATISTIC WOC PT EDUCATION, 16-30 MIN

## 2018-03-06 PROCEDURE — 36415 COLL VENOUS BLD VENIPUNCTURE: CPT | Performed by: INTERNAL MEDICINE

## 2018-03-06 PROCEDURE — 71045 X-RAY EXAM CHEST 1 VIEW: CPT

## 2018-03-06 PROCEDURE — 84145 PROCALCITONIN (PCT): CPT | Performed by: INTERNAL MEDICINE

## 2018-03-06 PROCEDURE — 94640 AIRWAY INHALATION TREATMENT: CPT

## 2018-03-06 PROCEDURE — 27210509 ZZH TRAY VALVED DOUBLE LUMEN

## 2018-03-06 PROCEDURE — 25000128 H RX IP 250 OP 636: Performed by: STUDENT IN AN ORGANIZED HEALTH CARE EDUCATION/TRAINING PROGRAM

## 2018-03-06 PROCEDURE — 87205 SMEAR GRAM STAIN: CPT | Performed by: INTERNAL MEDICINE

## 2018-03-06 RX ORDER — IOPAMIDOL 755 MG/ML
500 INJECTION, SOLUTION INTRAVASCULAR ONCE
Status: COMPLETED | OUTPATIENT
Start: 2018-03-06 | End: 2018-03-06

## 2018-03-06 RX ORDER — FUROSEMIDE 10 MG/ML
20 INJECTION INTRAMUSCULAR; INTRAVENOUS ONCE
Status: COMPLETED | OUTPATIENT
Start: 2018-03-06 | End: 2018-03-06

## 2018-03-06 RX ORDER — POTASSIUM CHLORIDE 7.45 MG/ML
10 INJECTION INTRAVENOUS
Status: DISCONTINUED | OUTPATIENT
Start: 2018-03-06 | End: 2018-03-08

## 2018-03-06 RX ORDER — IPRATROPIUM BROMIDE AND ALBUTEROL SULFATE 2.5; .5 MG/3ML; MG/3ML
3 SOLUTION RESPIRATORY (INHALATION) ONCE
Status: COMPLETED | OUTPATIENT
Start: 2018-03-06 | End: 2018-03-06

## 2018-03-06 RX ORDER — HEPARIN SODIUM,PORCINE 10 UNIT/ML
2-5 VIAL (ML) INTRAVENOUS
Status: DISCONTINUED | OUTPATIENT
Start: 2018-03-06 | End: 2018-03-06 | Stop reason: CLARIF

## 2018-03-06 RX ORDER — MAGNESIUM SULFATE HEPTAHYDRATE 40 MG/ML
4 INJECTION, SOLUTION INTRAVENOUS EVERY 4 HOURS PRN
Status: DISCONTINUED | OUTPATIENT
Start: 2018-03-06 | End: 2018-03-14 | Stop reason: HOSPADM

## 2018-03-06 RX ORDER — SODIUM CHLORIDE, SODIUM LACTATE, POTASSIUM CHLORIDE, CALCIUM CHLORIDE 600; 310; 30; 20 MG/100ML; MG/100ML; MG/100ML; MG/100ML
INJECTION, SOLUTION INTRAVENOUS CONTINUOUS
Status: ACTIVE | OUTPATIENT
Start: 2018-03-06 | End: 2018-03-08

## 2018-03-06 RX ORDER — POTASSIUM CHLORIDE 1500 MG/1
20-40 TABLET, EXTENDED RELEASE ORAL
Status: DISCONTINUED | OUTPATIENT
Start: 2018-03-06 | End: 2018-03-08

## 2018-03-06 RX ORDER — POTASSIUM CL/LIDO/0.9 % NACL 10MEQ/0.1L
10 INTRAVENOUS SOLUTION, PIGGYBACK (ML) INTRAVENOUS
Status: DISCONTINUED | OUTPATIENT
Start: 2018-03-06 | End: 2018-03-08

## 2018-03-06 RX ORDER — POTASSIUM CHLORIDE 29.8 MG/ML
20 INJECTION INTRAVENOUS
Status: DISCONTINUED | OUTPATIENT
Start: 2018-03-06 | End: 2018-03-08

## 2018-03-06 RX ORDER — LABETALOL HYDROCHLORIDE 5 MG/ML
10 INJECTION, SOLUTION INTRAVENOUS
Status: DISCONTINUED | OUTPATIENT
Start: 2018-03-06 | End: 2018-03-14 | Stop reason: HOSPADM

## 2018-03-06 RX ORDER — POTASSIUM CHLORIDE 1.5 G/1.58G
20-40 POWDER, FOR SOLUTION ORAL
Status: DISCONTINUED | OUTPATIENT
Start: 2018-03-06 | End: 2018-03-08

## 2018-03-06 RX ORDER — IPRATROPIUM BROMIDE AND ALBUTEROL SULFATE 2.5; .5 MG/3ML; MG/3ML
3 SOLUTION RESPIRATORY (INHALATION)
Status: DISCONTINUED | OUTPATIENT
Start: 2018-03-06 | End: 2018-03-14 | Stop reason: HOSPADM

## 2018-03-06 RX ORDER — PANTOPRAZOLE SODIUM 40 MG/1
40 TABLET, DELAYED RELEASE ORAL EVERY MORNING
Status: DISCONTINUED | OUTPATIENT
Start: 2018-03-07 | End: 2018-03-07

## 2018-03-06 RX ORDER — LIDOCAINE 40 MG/G
CREAM TOPICAL
Status: DISCONTINUED | OUTPATIENT
Start: 2018-03-06 | End: 2018-03-06 | Stop reason: CLARIF

## 2018-03-06 RX ADMIN — ONDANSETRON 4 MG: 2 INJECTION INTRAMUSCULAR; INTRAVENOUS at 00:56

## 2018-03-06 RX ADMIN — ENOXAPARIN SODIUM 40 MG: 40 INJECTION SUBCUTANEOUS at 13:02

## 2018-03-06 RX ADMIN — IPRATROPIUM BROMIDE AND ALBUTEROL SULFATE 3 ML: .5; 3 SOLUTION RESPIRATORY (INHALATION) at 10:10

## 2018-03-06 RX ADMIN — TAZOBACTAM SODIUM AND PIPERACILLIN SODIUM 3.38 G: 375; 3 INJECTION, SOLUTION INTRAVENOUS at 18:03

## 2018-03-06 RX ADMIN — POTASSIUM CHLORIDE 10 MEQ: 7.46 INJECTION, SOLUTION INTRAVENOUS at 23:18

## 2018-03-06 RX ADMIN — PANTOPRAZOLE SODIUM 40 MG: 40 INJECTION, POWDER, FOR SOLUTION INTRAVENOUS at 09:38

## 2018-03-06 RX ADMIN — LORAZEPAM 0.5 MG: 2 INJECTION INTRAMUSCULAR; INTRAVENOUS at 09:57

## 2018-03-06 RX ADMIN — PROCHLORPERAZINE EDISYLATE 10 MG: 5 INJECTION INTRAMUSCULAR; INTRAVENOUS at 04:09

## 2018-03-06 RX ADMIN — TAZOBACTAM SODIUM AND PIPERACILLIN SODIUM 3.38 G: 375; 3 INJECTION, SOLUTION INTRAVENOUS at 13:02

## 2018-03-06 RX ADMIN — POTASSIUM CHLORIDE 10 MEQ: 7.46 INJECTION, SOLUTION INTRAVENOUS at 20:52

## 2018-03-06 RX ADMIN — POTASSIUM CHLORIDE 10 MEQ: 7.46 INJECTION, SOLUTION INTRAVENOUS at 22:08

## 2018-03-06 RX ADMIN — HYDROMORPHONE HYDROCHLORIDE: 10 INJECTION, SOLUTION INTRAMUSCULAR; INTRAVENOUS; SUBCUTANEOUS at 15:20

## 2018-03-06 RX ADMIN — Medication 2 G: at 18:51

## 2018-03-06 RX ADMIN — FUROSEMIDE 20 MG: 10 INJECTION, SOLUTION INTRAMUSCULAR; INTRAVENOUS at 06:44

## 2018-03-06 RX ADMIN — IOPAMIDOL 78 ML: 755 INJECTION, SOLUTION INTRAVENOUS at 11:34

## 2018-03-06 RX ADMIN — SODIUM CHLORIDE, POTASSIUM CHLORIDE, SODIUM LACTATE AND CALCIUM CHLORIDE: 600; 310; 30; 20 INJECTION, SOLUTION INTRAVENOUS at 15:13

## 2018-03-06 RX ADMIN — SODIUM CHLORIDE 96 ML: 9 INJECTION, SOLUTION INTRAVENOUS at 11:35

## 2018-03-06 ASSESSMENT — ACTIVITIES OF DAILY LIVING (ADL)
ADLS_ACUITY_SCORE: 12
ADLS_ACUITY_SCORE: 11
ADLS_ACUITY_SCORE: 12
ADLS_ACUITY_SCORE: 11

## 2018-03-06 ASSESSMENT — PAIN DESCRIPTION - DESCRIPTORS
DESCRIPTORS: PRESSURE
DESCRIPTORS: BURNING

## 2018-03-06 NOTE — PLAN OF CARE
Problem: Patient Care Overview  Goal: Plan of Care/Patient Progress Review  Outcome: Improving  Pt remains hospitalized from POD 4 from a colectomy.  Today during ostomy bag change there was bleeding - please refer to WOC's note.  This shift pt had + gas, + stool 50 mL, tan and red output with a few clots.  PCA continues pt used 0.6mg of dilaudid, rating pain 2-5/10.  Pt showered this shift.  Midline incision is closed with staples and PRAVIN. VSS.

## 2018-03-06 NOTE — PROVIDER NOTIFICATION
Notified Dr. HORTA of change in condition, increased resp, tachycardia, diaphoresis, abd distention. Received New orders.

## 2018-03-06 NOTE — DISCHARGE INSTRUCTIONS
1. [facility] Dr uLke -Arranged follow-up as an outpatient in 3 months for the 1st surveillance visit.    [address] [facility] [address], [place] [address]   Phone: [phone]      2. [facility]-Establish care with Dr Andrews for primary care.    Address  [address] [place] [address]   Contact  Appointments: [phone])   Information: [phone]   Fax: [phone]   Urgent Care: [phone]

## 2018-03-06 NOTE — PLAN OF CARE
Problem: Patient Care Overview  Goal: Plan of Care/Patient Progress Review  Outcome: Declining  POD#5 colectomy with colostomy, (see previous note about change in condition), discussed with MD CT of chest and abd XRAY and all new lab orders. NGT placed and 1300cc removed, feeling better after NGT placement and AM lasix. PCA pump and O2 required, needs encouragement for IS and walking. PCDs on and functioning

## 2018-03-06 NOTE — PROGRESS NOTES
COLON & RECTAL SURGERY  PROGRESS NOTE    March 6, 2018  Post-op Day #5 left colectomy with transverse colostomy for obstructing cancer    SUBJECTIVE:   Pt doing well this morning but did have nausea last night on the verge of vomiting.  Nausea subsided this AM.  Pain well controlled.      OBJECTIVE:  Temp:  [97.2  F (36.2  C)-98.5  F (36.9  C)] 97.2  F (36.2  C)  Pulse:  [105] 105  Heart Rate:  [] 101  Resp:  [20-22] 20  BP: (133-167)/() 145/92  SpO2:  [92 %-96 %] 93 %    Intake/Output Summary (Last 24 hours) at 03/06/18 0914  Last data filed at 03/06/18 0806   Gross per 24 hour   Intake             2246 ml   Output             1575 ml   Net              671 ml       GENERAL:  Awake, alert, no acute distress  ABDOMEN:  Soft, distended.  Tender around the incision.  INcision healing well with staples in situ.    LABS:  Lab Results   Component Value Date    WBC 9.4 03/04/2018     Lab Results   Component Value Date    HGB 14.0 03/06/2018     Lab Results   Component Value Date    HCT 33.5 03/04/2018     Lab Results   Component Value Date     03/05/2018     Last Basic Metabolic Panel:  Lab Results   Component Value Date     03/04/2018      Lab Results   Component Value Date    POTASSIUM 3.6 03/04/2018     Lab Results   Component Value Date    CHLORIDE 106 03/04/2018     Lab Results   Component Value Date    JANICE 7.9 03/04/2018     Lab Results   Component Value Date    CO2 28 03/04/2018     Lab Results   Component Value Date    BUN 13 03/04/2018     Lab Results   Component Value Date    CR 0.70 03/04/2018     Lab Results   Component Value Date    GLC 82 03/04/2018       ASSESSMENT/PLAN: POD5 left colectomy with transverse colostomy for obstructing cancer.   Had nasuea overnight that resolved.  Pain well controlled.  Had bleeding around the stoma that resolved.  Hgb stable.      1. Continue with CLD  2. Lovenox sc teaching  3. Continue PCA dilaudid and IV tylenol   4. Decrease IVF  5. Encourage OOB,  ambulate     Patient was discussed with Dr. Farhana Villa, Colorectal Fellow  [facility]  Phone: [phone]      Colon and Rectal Surgery Attending Note    Patient seen and examined independently.  Agree with above assessment and plan.  Tachy earlier. CT chest without PE, no abdominal CT done.   WBC count normal this AM.   NGT with 1.3 out   abd firm distended with out rebound or guarding. Staples in place  Stoma irrigated with 100 cc, 250 dark brown stool resulted.    Plan  NPO  TPN  PT  PCA   Lasix and abx for PNA per hospitalist.    If further concerns, obtain CT a/p.    Joann Delcid MD  [facility]  Office Phone # [phone]

## 2018-03-06 NOTE — PROGRESS NOTES
[facility]  Hospitalist Progress Note  Patient Name: Gab Holloway    MRN: [id number]  Provider:  Jeet Haddad MD    Date:03/06/2018      Initial presenting complaint/issue to hospital (Diagnosis): abd pain     Summary of Stay: Gab Holloway is a 57 year old healthy male who was admitted on 2/26/2018 with abdominal pain/bloating. Found to have colonic mass with partial/early obstruction suspicious for malignancy. He had flex sig on 2/28 which showed partially obstructing mass- prelim pathology showed adenocarcinoma. He is s/p open colectomy w/ colostomy on 3/1/2018 per CRS-awaiting return of bowel function. Hem/onc consulted for planning of future treatment and discussion about prognosis.  He now is slowly advancing his diet and having return of bowel function.    Early 3/6 he developed increased productive cough and increased work of breathing.  Chest x-ray showed low lung volumes, and subsequent CT scan was negative for pulmonary embolism but revealed bilateral effusions with bibasilar infiltrates suggestive of pneumonia.  He has been started on IV Zosyn pending blood and sputum cultures.          Assessment and Plan:      Colonic obstruction 2/2 partially obstructing adenocarcinoma of colon s/p partial colectomy w/ colostomy on 3/1/2018. Pt presented with abd pain and e/o colonic obstruction 2/2 colonic mass.  Had GGE yesterday.Sigmoidoscopy on 2/28, likely malignant obstructing mass per prelim path after sigmoidoscopy   --appreciate CRS consult, s/p colectomy on 3/1 without any immediate complication  --Consulted hem/onc  --on dilaudid PCA, management deferred to Surgery  --NG, diet advancement per surgery    Acute hypoxemic respiratory failure: This primarily manifested itself on 3/6.  He has had productive cough of green sputum, tachypnea, and hypoxia.  CT chest was negative for PE but does show bilateral infiltrates concerning for pneumonia.  He has been started on IV  "Zosyn pending blood and sputum cultures.    +ve peritoneal wash culture: growing gram +ve rods, suspect contaminant however I am not sure about its significance - ?contaminant vs leak  --defer to CRS, current plan appears to be no abx for this indication.    New dx of Adenocarcinoma of colon: oncology on board, will need close outpatient follow up.    Asymptomatic cholelithiasis.         # Pain Assessment:  Managed by surgical team.    DVT Prophylaxis:  - -lovenox post op   Code Status: Full Code  Discharge Dispo: home  Estimated Disch Date / # of Days until Discharge: TBD-pending return of bowel function and respiratory status.  ?2 more days.        Interval History:      He developed increased work of breathing and productive cough early this morning.  Chest x-ray showed low lung volumes, and CT chest was negative for PE but did suggest bilateral infiltrates and effusions  Starting Zosyn for apparent pneumonia pending cultures  He otherwise denies new complaints, denies chest pain  Continues to have some output into his ostomy      Data reviewed today: I reviewed all new labs and imaging reports over the last 24 hours. I personally reviewed no images or EKG's today.         Physical Exam:      Last Vital Signs:  /85 (BP Location: Left arm)  Pulse 120  Temp 96.6  F (35.9  C) (Oral)  Resp (!) 32  Ht 1.778 m (5' 10\")  Wt 91.2 kg (201 lb 1.6 oz)  SpO2 92%  BMI 28.85 kg/m2  GENERAL: No apparent distress. Awake, alert, and fully oriented.  HEENT: Normocephalic, atraumatic. Extraocular movements intact.  CARDIOVASCULAR: Regular rate and rhythm without murmurs or rubs. No S3.  PULMONARY: diminished bilaterally, increased work of breathing, productive cough.  ABDOMINAL: Soft, non-tender,  distended. Bowel sounds absent. No hepatosplenomegaly.  EXTREMITIES: No cyanosis or clubbing. No edema.  NEUROLOGICAL: CN 2-12 grossly intact, no focal neurological deficits.  DERMATOLOGICAL: No rash, ulcer, ecchymoses, " jaundice.  PSYCH: appropriate           Medications:      All current medications were reviewed.         Data:      All new lab and imaging data was reviewed.   Data       Recent Labs  Lab 03/06/18  0923 03/06/18  0718 03/05/18  1512 03/05/18  0657 03/04/18  0738 03/03/18  0727   WBC 6.5  --   --   --  9.4 9.5   HGB 13.5 14.0 13.0*  --  10.8* 11.4*   HCT 38.6*  --   --   --  33.5* 34.9*   MCV 94  --   --   --  101* 102*     --   --  142* 127* 111*       Recent Labs  Lab 03/06/18  0923 03/04/18  0738 03/03/18  0727    138 137   POTASSIUM 3.3* 3.6 3.9   CHLORIDE 103 106 105   CO2 28 28 27   ANIONGAP 7 4 5   * 82 95   BUN 11 13 13   CR 0.58* 0.70 0.89   GFRESTIMATED >90 >90 87   GFRESTBLACK >90 >90 >90   JANICE 8.0* 7.9* 7.7*       Recent Results (from the past 24 hour(s))   XR Chest Port 1 View    Narrative    CHEST PORTABLE ONE VIEW March 6, 2018 8:28 AM    HISTORY: Respiratory distress.     COMPARISON: None.      Impression    IMPRESSION: Shallow inspiration. Pulmonary vasculature felt to be  upper limits of normal by portable technique. Otherwise negative.     JOÃO HAMILTON MD   XR Abdomen 1 View    Narrative    ABDOMEN ONE VIEW March 6, 2018 12:18 PM    HISTORY: Abdominal distension, status post recent colectomy for colon  cancer.     COMPARISON: CT scan 2/27/2018.      Impression    IMPRESSION: Surgical staples project over the midline abdomen and  pelvis. Air-filled loops of small bowel and colon in a nonspecific  pattern, possibly mild ileus.     JOÃO HAMILTON MD   CT Chest Pulmonary Embolism w Contrast    Narrative    CT CHEST PULMONARY EMBOLISM WITH CONTRAST  3/6/2018 12:40 PM     HISTORY: Status post colectomy for colon cancer, now with hypoxia and  increased respiratory rate.  Question PE vs new pneumonia.     TECHNIQUE: Thin section axial images are performed from the thoracic  inlet to the lung bases utilizing 78 mL of Isovue 370 IV contrast  without adverse event. Coronal reformatted  images are also generated.  Radiation dose for this scan was reduced using automated exposure  control, adjustment of the mA and/or kV according to patient size, or  iterative reconstruction technique.    FINDINGS:  Respiratory motion artifact is present on the exam.  Bibasilar lung consolidation is present with small bilateral pleural  effusions. Findings could indicate pneumonia. Fluid is present in the  distal esophagus, possibly indicating reflux. No esophageal wall  thickening or obstruction is evident. No enlarged lymph nodes. No  evidence of pulmonary embolism. Thoracic aorta is unremarkable.  Limited images upper abdomen demonstrate calcified gallstones and  probable fatty liver infiltration. Gallbladder is otherwise  unremarkable. Imaged portions of the upper abdominal organs are  otherwise within normal limits. Postsurgical changes are noted in the  upper abdomen consistent with recent colectomy.      Impression    IMPRESSION:  1. No evidence of pulmonary embolism. Thoracic aorta is unremarkable.  2. Bibasilar lung consolidation concerning for pneumonia. Small  bilateral pleural effusions are also evident.  3. Cholelithiasis and probable fatty infiltration of the liver.  4. Postop changes upper abdomen from recent colectomy.  5. Small amount of fluid in the distal esophagus, possibly due to  reflux. No esophageal wall thickening.    NELSON HICKS MD       03/06/2018  Jeet Haddad MD

## 2018-03-06 NOTE — PLAN OF CARE
Problem: Patient Care Overview  Goal: Plan of Care/Patient Progress Review  POD 5 left colectomy with transverse colostomy   Ambulatory Status:  Pt up SBA  VS:  HTN, tachycardia  Pain: PCA for pain  Resp: LS clear/dimminished  GI:  zofran and compazine for nausea.  On clear liquid diet, has not taken any input tonight.  BS hypoactive.  Passing flatus.  Last BM 3/6 into ostomy.  Consults:  Colorectal, oncology and WOC  Disposition:  tbd

## 2018-03-06 NOTE — PROGRESS NOTES
Oncology/Hematology Follow Up Note:    Assessment and Plan:  #1 Obstructive left-sided colon cancer, s/p left hemicolectomy      It was my pleasure to see the patient in the hospital today.  He underwent left hemicolectomy for his newly diagnosed obstructive left-sided colon cancer last Thursday.  Pathology report is currently still pending, but should be back later today.      If he is found to have lymph node positive disease (stage III), I would strongly recommend a course of adjuvant chemotherapy to decrease the risk of recurrence.  If his disease is lymph node negative, we would need to assess the risk factors, including extent, grade, and MSI status of the tumor to determine the need for adjuvant chemotherapy.  He already has one adverse risk factor, which is the obstructive nature of the tumor.      PLAN:  1. Follow up on pathology report (should be back later today).  Will discuss with patient tomorrow morning.  2. Will arrange follow-up as an outpatient      Raj Luke M.D.  [facility]  [phone]       Subjective:    The patient developed shortness of breath overnight.  Was empirically given IV Lasix this morning for diuresis.  Scheduled for a CT PE protocol later this morning.    Advanced to clear liquid diet yesterday.  He developed abdominal bloating and nausea after dinner last night.  Stool output remains normal.      Labs:  All labs reviewed    CBC  Recent Labs  Lab 03/06/18 0923 03/06/18 0718 03/05/18  1512 03/05/18  0657 03/04/18  0738 03/03/18  0727   WBC 6.5  --   --   --  9.4 9.5   HGB 13.5 14.0 13.0*  --  10.8* 11.4*   MCV 94  --   --   --  101* 102*     --   --  142* 127* 111*       CMP  Recent Labs  Lab 03/06/18 0923 03/04/18  0738 03/03/18  0727 03/02/18  0737 03/01/18  1902    138 137  --  136   POTASSIUM 3.3* 3.6 3.9  --  4.7   CHLORIDE 103 106 105  --  106   CO2 28 28 27  --  23   ANIONGAP 7 4 5  --  7   * 82 95 129* 135*   BUN 11 13 13  --  12   CR 0.58*  0.70 0.89  --  0.74   GFRESTIMATED >90 >90 87  --  >90   GFRESTBLACK >90 >90 >90  --  >90   JANICE 8.0* 7.9* 7.7*  --  7.6*       INR  Recent Labs  Lab 02/28/18  0636   INR 1.05       Blood Culture  Recent Labs  Lab 03/01/18  1400   CULT On day 5, isolated in broth only:Gram positive bacilli resembling diphtheroids*  Culture in progress  On day 2, isolated in broth only:Bacillus species, not anthracis nor cereus group*  Critical Value/Significant Value, preliminary result only, called to and read back byIsabella Valdivia RN @1211 on 3/3/18. .         Raj Luke MD  [facility]  3/6/2018 1:29 PM

## 2018-03-06 NOTE — PROGRESS NOTES
"X-cover    Called for increased wheezing and mildly increased respiratory distress.     No hx smoking. Denies CP and notably on Lovenox 40 mg daily for DVT prophylaxis.     Exam:  BP (!) 161/104  Pulse 100  Temp 98.5  F (36.9  C) (Oral)  Resp 20  Ht 1.778 m (5' 10\")  Wt 91.2 kg (201 lb 1.6 oz)  SpO2 95%  BMI 28.85 kg/m2 on 3 LPM by NC.  Audible upper airway \"wheeze\". Mildly dyspneic.  No rales or egophany.   Diffusely volume overloaded with bd distention, LE edema notable.     New upper airway wheeze without known heart or lung disease in this pt who is now POD#5 s/p left leilani-colectomy with colostomy for new dx adenoca. Appears volume overloaded.     Will give a small dose of lasix to see how he tolerates this. Doubt PE. Doubt overt CHF.     KP  "

## 2018-03-06 NOTE — PROGRESS NOTES
brief update:    CT scan obtained due to decline in respiratory status w/ productive cough, hypoxia and tachypnea shows likely bibasilar pneumonia but no PE.  Abd XR shows likely mild ileus.  Starting zosyn for pneumonia, checking blood and sputum cultures as well as blood gas.  Lactate normal.  His CT scan also appears to show bilateral effusions.  Incentive spirometry should be continued.  Full note to follow.    Jeet Haddad MD

## 2018-03-06 NOTE — PROGRESS NOTES
"[facility] Nurse Inpatient Ostomy Assessment      Assessment of ostomy and needs for:   New Colostomy      Data:   History:      Per MD note(s): Summary of Stay: Gab Holloway is a 57 year old healthy male who was admitted on 2/26/2018 with abdominal pain/bloating. Found to have colonic mass with partial/early obstruction suspicious for malignancy. He had flex sig on 2/28 which showed partially obstructing mass- prelim pathology showed adenocarcinoma. He is s/p open colectomy w/ colostomy on 3/1/2018 per CRS-awaiting return of bowel function. Hem/onc consulted for planning of future treatment and discussion about prognosis.     Type of ostomy:  Colostomy   Stoma assessment:   ? Size of stoma:  2\",  beefy red, moist, edematous, firm, protruberant   ? A bridge in place?: No  Mucocutaneous Junction (MCJ):  Not visualized today, barrier in place; has been intact  Peristomal skin:  Not visualized today, barrier in place; has been intact  Abdominal assessment: slightly distended  ? N/G still in place?:  Yes, was just replaced today  Output:  small, liquid and brown    I/O last 3 completed shifts:  In: 943 [P.O.:200; I.V.:743]  Out: 2700 [Urine:2525; Emesis/NG output:100; Stool:75]       Diet:         Active Diet Order      Diet      NPO for Medical/Clinical Reasons Except for: No Exceptions    Labs:   Recent Labs   Lab Test  03/06/18   0923   02/28/18   0636   02/26/18   2320   ALBUMIN   --    --    --    --   4.1   HGB  13.5   < >   --    < >  15.8   INR   --    --   1.05   --    --    WBC  6.5   < >   --    < >  10.8    < > = values in this interval not displayed.           Intervention:     Patient's chart evaluated.      Assessments done today:  Stoma, pouching system, readiness to learn, supply needs    Education: pt very worn out today from various procedures and not up for any education    Prepared for discharge by: supplies gathered    Pt registered for ostomy supply samples? No: not at " this time         Assessment:     Stoma assessment: healthy, beefy red, edematous, no active bleeding noted today. Pouch that was applied yesterday Mon 3-5 remains intact.      Learning needs/ comprehension: pt not physically feeling well enough for much education yet.  Will continue to provide ostomy teaching as able.      Effectiveness of current pouching/ supply plan: TBD         Plan:     Plan:   ? Current pouching system: [place] one piece flat pouch.  Additional supplies in room.   ?  Continue to prepare for d/c - will finalize supplies and d/c instructions prior to discharge  o Do [facility] Nurse recommend home care:  Yes

## 2018-03-06 NOTE — PROGRESS NOTES
0630 Pt respiratory status decreasing.  He is requiring 3L of O2 to stay in low 90's.  He is wheezy and short of breath.  MD paged, one time order of Lasix ordered and fluids to be SL.

## 2018-03-06 NOTE — CONSULTS
Pt doesn't have a primary care provider.  He is agreeable to establish care with [facility] Dr Andrews.  Pt given hand out on clinic and dc avs updated.  Silvina RN CTS [phone]

## 2018-03-07 LAB
ALBUMIN SERPL-MCNC: 1.9 G/DL (ref 3.4–5)
ALP SERPL-CCNC: 127 U/L (ref 40–150)
ALT SERPL W P-5'-P-CCNC: 87 U/L (ref 0–70)
ANION GAP SERPL CALCULATED.3IONS-SCNC: 6 MMOL/L (ref 3–14)
AST SERPL W P-5'-P-CCNC: 150 U/L (ref 0–45)
BACTERIA SPEC CULT: ABNORMAL
BACTERIA SPEC CULT: ABNORMAL
BASOPHILS # BLD AUTO: 0 10E9/L (ref 0–0.2)
BASOPHILS NFR BLD AUTO: 0.4 %
BILIRUB SERPL-MCNC: 1.2 MG/DL (ref 0.2–1.3)
BUN SERPL-MCNC: 15 MG/DL (ref 7–30)
CALCIUM SERPL-MCNC: 7.7 MG/DL (ref 8.5–10.1)
CHLORIDE SERPL-SCNC: 106 MMOL/L (ref 94–109)
CO2 SERPL-SCNC: 29 MMOL/L (ref 20–32)
COPATH REPORT: NORMAL
CREAT SERPL-MCNC: 0.66 MG/DL (ref 0.66–1.25)
DIFFERENTIAL METHOD BLD: ABNORMAL
EOSINOPHIL # BLD AUTO: 0.1 10E9/L (ref 0–0.7)
EOSINOPHIL NFR BLD AUTO: 1.4 %
ERYTHROCYTE [DISTWIDTH] IN BLOOD BY AUTOMATED COUNT: 14.9 % (ref 10–15)
GFR SERPL CREATININE-BSD FRML MDRD: >90 ML/MIN/1.7M2
GLUCOSE BLDC GLUCOMTR-MCNC: 104 MG/DL (ref 70–99)
GLUCOSE SERPL-MCNC: 97 MG/DL (ref 70–99)
GRAM STN SPEC: NORMAL
HCT VFR BLD AUTO: 32.3 % (ref 40–53)
HGB BLD-MCNC: 11.2 G/DL (ref 13.3–17.7)
IMM GRANULOCYTES # BLD: 0.1 10E9/L (ref 0–0.4)
IMM GRANULOCYTES NFR BLD: 1.5 %
INR PPP: 1.12 (ref 0.86–1.14)
LYMPHOCYTES # BLD AUTO: 1.2 10E9/L (ref 0.8–5.3)
LYMPHOCYTES NFR BLD AUTO: 14.6 %
Lab: ABNORMAL
Lab: NORMAL
MAGNESIUM SERPL-MCNC: 2.2 MG/DL (ref 1.6–2.3)
MCH RBC QN AUTO: 33.5 PG (ref 26.5–33)
MCHC RBC AUTO-ENTMCNC: 34.7 G/DL (ref 31.5–36.5)
MCV RBC AUTO: 97 FL (ref 78–100)
MONOCYTES # BLD AUTO: 0.7 10E9/L (ref 0–1.3)
MONOCYTES NFR BLD AUTO: 9.3 %
NEUTROPHILS # BLD AUTO: 5.8 10E9/L (ref 1.6–8.3)
NEUTROPHILS NFR BLD AUTO: 72.8 %
NRBC # BLD AUTO: 0 10*3/UL
NRBC BLD AUTO-RTO: 0 /100
PHOSPHATE SERPL-MCNC: 2.1 MG/DL (ref 2.5–4.5)
PLATELET # BLD AUTO: 204 10E9/L (ref 150–450)
POTASSIUM SERPL-SCNC: 3.3 MMOL/L (ref 3.4–5.3)
PREALB SERPL IA-MCNC: 8 MG/DL (ref 15–45)
PROT SERPL-MCNC: 5.5 G/DL (ref 6.8–8.8)
RBC # BLD AUTO: 3.34 10E12/L (ref 4.4–5.9)
SODIUM SERPL-SCNC: 141 MMOL/L (ref 133–144)
SPECIMEN SOURCE: ABNORMAL
SPECIMEN SOURCE: NORMAL
WBC # BLD AUTO: 7.9 10E9/L (ref 4–11)

## 2018-03-07 PROCEDURE — 99231 SBSQ HOSP IP/OBS SF/LOW 25: CPT | Performed by: INTERNAL MEDICINE

## 2018-03-07 PROCEDURE — 85610 PROTHROMBIN TIME: CPT | Performed by: INTERNAL MEDICINE

## 2018-03-07 PROCEDURE — 25000132 ZZH RX MED GY IP 250 OP 250 PS 637: Performed by: INTERNAL MEDICINE

## 2018-03-07 PROCEDURE — 25000125 ZZHC RX 250: Performed by: HOSPITALIST

## 2018-03-07 PROCEDURE — 83735 ASSAY OF MAGNESIUM: CPT | Performed by: INTERNAL MEDICINE

## 2018-03-07 PROCEDURE — 85025 COMPLETE CBC W/AUTO DIFF WBC: CPT | Performed by: INTERNAL MEDICINE

## 2018-03-07 PROCEDURE — 25000125 ZZHC RX 250: Performed by: COLON & RECTAL SURGERY

## 2018-03-07 PROCEDURE — 80053 COMPREHEN METABOLIC PANEL: CPT | Performed by: INTERNAL MEDICINE

## 2018-03-07 PROCEDURE — 40000904 ZZH STATISTIC WOC PT EDUCATION, 46-60 MIN

## 2018-03-07 PROCEDURE — 25000128 H RX IP 250 OP 636: Performed by: STUDENT IN AN ORGANIZED HEALTH CARE EDUCATION/TRAINING PROGRAM

## 2018-03-07 PROCEDURE — 25000125 ZZHC RX 250: Performed by: INTERNAL MEDICINE

## 2018-03-07 PROCEDURE — 00000146 ZZHCL STATISTIC GLUCOSE BY METER IP

## 2018-03-07 PROCEDURE — 99207 ZZC CDG-MDM COMPONENT: MEETS LOW - DOWN CODED: CPT | Performed by: INTERNAL MEDICINE

## 2018-03-07 PROCEDURE — 25000128 H RX IP 250 OP 636: Performed by: COLON & RECTAL SURGERY

## 2018-03-07 PROCEDURE — 12000000 ZZH R&B MED SURG/OB

## 2018-03-07 PROCEDURE — 84100 ASSAY OF PHOSPHORUS: CPT | Performed by: INTERNAL MEDICINE

## 2018-03-07 PROCEDURE — 40000257 ZZH STATISTIC CONSULT NO CHARGE VASC ACCESS

## 2018-03-07 PROCEDURE — 84134 ASSAY OF PREALBUMIN: CPT | Performed by: INTERNAL MEDICINE

## 2018-03-07 PROCEDURE — 25000128 H RX IP 250 OP 636: Performed by: HOSPITALIST

## 2018-03-07 PROCEDURE — 25000128 H RX IP 250 OP 636: Performed by: INTERNAL MEDICINE

## 2018-03-07 RX ORDER — NICOTINE POLACRILEX 4 MG
15-30 LOZENGE BUCCAL
Status: DISCONTINUED | OUTPATIENT
Start: 2018-03-07 | End: 2018-03-14 | Stop reason: HOSPADM

## 2018-03-07 RX ORDER — DIPHENHYDRAMINE HCL 25 MG
25 CAPSULE ORAL EVERY 6 HOURS PRN
Status: DISCONTINUED | OUTPATIENT
Start: 2018-03-07 | End: 2018-03-14 | Stop reason: HOSPADM

## 2018-03-07 RX ORDER — ACETAMINOPHEN 10 MG/ML
1000 INJECTION, SOLUTION INTRAVENOUS EVERY 8 HOURS
Status: DISCONTINUED | OUTPATIENT
Start: 2018-03-07 | End: 2018-03-11

## 2018-03-07 RX ORDER — DEXTROSE MONOHYDRATE 25 G/50ML
25-50 INJECTION, SOLUTION INTRAVENOUS
Status: DISCONTINUED | OUTPATIENT
Start: 2018-03-07 | End: 2018-03-14 | Stop reason: HOSPADM

## 2018-03-07 RX ADMIN — ACETAMINOPHEN 1000 MG: 10 INJECTION, SOLUTION INTRAVENOUS at 16:09

## 2018-03-07 RX ADMIN — TAZOBACTAM SODIUM AND PIPERACILLIN SODIUM 3.38 G: 375; 3 INJECTION, SOLUTION INTRAVENOUS at 05:38

## 2018-03-07 RX ADMIN — DIPHENHYDRAMINE HYDROCHLORIDE 25 MG: 25 CAPSULE ORAL at 16:58

## 2018-03-07 RX ADMIN — ACETAMINOPHEN 1000 MG: 10 INJECTION, SOLUTION INTRAVENOUS at 09:04

## 2018-03-07 RX ADMIN — TAZOBACTAM SODIUM AND PIPERACILLIN SODIUM 3.38 G: 375; 3 INJECTION, SOLUTION INTRAVENOUS at 12:35

## 2018-03-07 RX ADMIN — ASCORBIC ACID, VITAMIN A PALMITATE, CHOLECALCIFEROL, THIAMINE HYDROCHLORIDE, RIBOFLAVIN-5 PHOSPHATE SODIUM, PYRIDOXINE HYDROCHLORIDE, NIACINAMIDE, DEXPANTHENOL, ALPHA-TOCOPHEROL ACETATE, VITAMIN K1, FOLIC ACID, BIOTIN, CYANOCOBALAMIN: 200; 3300; 200; 6; 3.6; 6; 40; 15; 10; 150; 600; 60; 5 INJECTION, SOLUTION INTRAVENOUS at 20:28

## 2018-03-07 RX ADMIN — POTASSIUM CHLORIDE 20 MEQ: 29.8 INJECTION, SOLUTION INTRAVENOUS at 13:38

## 2018-03-07 RX ADMIN — HYDROMORPHONE HYDROCHLORIDE: 10 INJECTION, SOLUTION INTRAMUSCULAR; INTRAVENOUS; SUBCUTANEOUS at 22:54

## 2018-03-07 RX ADMIN — POTASSIUM CHLORIDE 10 MEQ: 7.46 INJECTION, SOLUTION INTRAVENOUS at 00:55

## 2018-03-07 RX ADMIN — ENOXAPARIN SODIUM 40 MG: 40 INJECTION SUBCUTANEOUS at 12:35

## 2018-03-07 RX ADMIN — POTASSIUM PHOSPHATE, MONOBASIC AND POTASSIUM PHOSPHATE, DIBASIC 15 MMOL: 224; 236 INJECTION, SOLUTION, CONCENTRATE INTRAVENOUS at 13:43

## 2018-03-07 RX ADMIN — I.V. FAT EMULSION 250 ML: 20 EMULSION INTRAVENOUS at 20:28

## 2018-03-07 RX ADMIN — TAZOBACTAM SODIUM AND PIPERACILLIN SODIUM 3.38 G: 375; 3 INJECTION, SOLUTION INTRAVENOUS at 00:52

## 2018-03-07 RX ADMIN — SODIUM CHLORIDE, POTASSIUM CHLORIDE, SODIUM LACTATE AND CALCIUM CHLORIDE: 600; 310; 30; 20 INJECTION, SOLUTION INTRAVENOUS at 22:53

## 2018-03-07 RX ADMIN — PANTOPRAZOLE SODIUM 40 MG: 40 INJECTION, POWDER, FOR SOLUTION INTRAVENOUS at 08:55

## 2018-03-07 RX ADMIN — TAZOBACTAM SODIUM AND PIPERACILLIN SODIUM 3.38 G: 375; 3 INJECTION, SOLUTION INTRAVENOUS at 19:18

## 2018-03-07 ASSESSMENT — ACTIVITIES OF DAILY LIVING (ADL)
ADLS_ACUITY_SCORE: 11

## 2018-03-07 NOTE — PLAN OF CARE
Problem: Patient Care Overview  Goal: Plan of Care/Patient Progress Review  Outcome: No Change  HR continues to be tachy.  Continues on 3L O2.  Other VSS.  Denies pain, did not use dilaudid PCA.  Abd continues to be rounded/distended.  100mL soft, brown stool from ostomy.  400mL from NG.  Voided 250mL dark brown urine.  Lungs coarse, productive cough continues.  Iv zosyn q6h.  Up with A-1.  Will monitor.

## 2018-03-07 NOTE — PROGRESS NOTES
[facility]  Hospitalist Progress Note  Patient Name: Gab Holloway    MRN: [id number]  Provider:  Jeet Haddad MD  Date:03/07/2018      Initial presenting complaint/issue to hospital (Diagnosis): abd pain     Summary of Stay: Gab Holloway is a 57 year old healthy male who was admitted on 2/26/2018 with abdominal pain/bloating. Found to have colonic mass with partial/early obstruction suspicious for malignancy. He had flex sig on 2/28 which showed partially obstructing mass- prelim pathology showed adenocarcinoma. He is s/p open colectomy w/ colostomy on 3/1/2018 per CRS-awaiting return of bowel function. Hem/onc consulted for planning of future treatment and discussion about prognosis.  He now is slowly advancing his diet and having return of bowel function.    Early 3/6 he developed increased productive cough and increased work of breathing.  Chest x-ray showed low lung volumes, and subsequent CT scan was negative for pulmonary embolism but revealed bilateral effusions with bibasilar infiltrates suggestive of pneumonia.  He has been started on IV Zosyn pending blood and sputum cultures.  He did have NG tube replaced as well with large output which also seemed to make him feel better.          Assessment and Plan:      Colonic obstruction 2/2 partially obstructing adenocarcinoma of colon s/p partial colectomy w/ colostomy on 3/1/2018. Pt presented with abd pain and e/o colonic obstruction 2/2 colonic mass.Sigmoidoscopy on 2/28, likely malignant obstructing mass per prelim path after sigmoidoscopy.  Suspect ileus at this point as well.  --appreciate CRS consult, s/p colectomy on 3/1 without any immediate complication  --Consulted hem/onc  --on dilaudid PCA, management deferred to Surgery  --NG per Surgery  --PICC Placed, ?TPN    Acute hypoxemic respiratory failure: This primarily manifested itself on 3/6.  He has had productive cough of green sputum, tachypnea, and hypoxia.  CT  "chest was negative for PE but does show bilateral infiltrates concerning for pneumonia.  He has been started on IV Zosyn pending blood and sputum cultures.    +ve peritoneal wash culture: growing gram +ve rods, suspect contaminant however I am not sure about its significance - ?contaminant vs leak  --defer to CRS, current plan appears to be no abx for this indication.    New dx of Adenocarcinoma of colon: oncology on board, will need close outpatient follow up.    Asymptomatic cholelithiasis.         # Pain Assessment:  Managed by surgical team.    DVT Prophylaxis:  - -lovenox post op   Code Status: Full Code  Discharge Dispo: home  Estimated Disch Date / # of Days until Discharge: TBD-pending return of bowel function and respiratory status.  ?2 more days.        Interval History:      Feeling better today after gastric decompression and abx  PICC Placed yesterday  Still has an ileus  Still has productive cough      Data reviewed today: I reviewed all new labs and imaging reports over the last 24 hours. I personally reviewed no images or EKG's today.         Physical Exam:      Last Vital Signs:  /74 (BP Location: Left arm)  Pulse 120  Temp 98.1  F (36.7  C) (Oral)  Resp 18  Ht 1.778 m (5' 10\")  Wt 91.2 kg (201 lb 1.6 oz)  SpO2 97%  BMI 28.85 kg/m2  GENERAL: No apparent distress. Awake, alert, and fully oriented.  HEENT: Normocephalic, atraumatic. Extraocular movements intact.  CARDIOVASCULAR: Regular rate and rhythm without murmurs or rubs. No S3.  PULMONARY: diminished bilaterally, increased work of breathing, productive cough.  ABDOMINAL: Soft, non-tender,  distended. Bowel sounds absent. No hepatosplenomegaly.  EXTREMITIES: No cyanosis or clubbing. No edema.  NEUROLOGICAL: CN 2-12 grossly intact, no focal neurological deficits.  DERMATOLOGICAL: No rash, ulcer, ecchymoses, jaundice.  PSYCH: appropriate           Medications:      All current medications were reviewed.         Data:      All new lab " and imaging data was reviewed.   Data       Recent Labs  Lab 03/06/18  0923 03/06/18  0718 03/05/18  1512 03/05/18  0657 03/04/18  0738 03/03/18  0727   WBC 6.5  --   --   --  9.4 9.5   HGB 13.5 14.0 13.0*  --  10.8* 11.4*   HCT 38.6*  --   --   --  33.5* 34.9*   MCV 94  --   --   --  101* 102*     --   --  142* 127* 111*       Recent Labs  Lab 03/06/18  1357 03/06/18  0923 03/04/18  0738 03/03/18  0727   NA  --  138 138 137   POTASSIUM 3.3* 3.3* 3.6 3.9   CHLORIDE  --  103 106 105   CO2  --  28 28 27   ANIONGAP  --  7 4 5   GLC  --  113* 82 95   BUN  --  11 13 13   CR  --  0.58* 0.70 0.89   GFRESTIMATED  --  >90 >90 87   GFRESTBLACK  --  >90 >90 >90   JANICE  --  8.0* 7.9* 7.7*       Recent Results (from the past 24 hour(s))   XR Chest Port 1 View    Narrative    CHEST PORTABLE ONE VIEW March 6, 2018 8:28 AM    HISTORY: Respiratory distress.     COMPARISON: None.      Impression    IMPRESSION: Shallow inspiration. Pulmonary vasculature felt to be  upper limits of normal by portable technique. Otherwise negative.     JOÃO HAMILTON MD   XR Abdomen 1 View    Narrative    ABDOMEN ONE VIEW March 6, 2018 12:18 PM    HISTORY: Abdominal distension, status post recent colectomy for colon  cancer.     COMPARISON: CT scan 2/27/2018.      Impression    IMPRESSION: Surgical staples project over the midline abdomen and  pelvis. Air-filled loops of small bowel and colon in a nonspecific  pattern, possibly mild ileus.     JOÃO HAMILTON MD   CT Chest Pulmonary Embolism w Contrast    Narrative    CT CHEST PULMONARY EMBOLISM WITH CONTRAST  3/6/2018 12:40 PM     HISTORY: Status post colectomy for colon cancer, now with hypoxia and  increased respiratory rate.  Question PE vs new pneumonia.     TECHNIQUE: Thin section axial images are performed from the thoracic  inlet to the lung bases utilizing 78 mL of Isovue 370 IV contrast  without adverse event. Coronal reformatted images are also generated.  Radiation dose for this scan  was reduced using automated exposure  control, adjustment of the mA and/or kV according to patient size, or  iterative reconstruction technique.    FINDINGS:  Respiratory motion artifact is present on the exam.  Bibasilar lung consolidation is present with small bilateral pleural  effusions. Findings could indicate pneumonia. Fluid is present in the  distal esophagus, possibly indicating reflux. No esophageal wall  thickening or obstruction is evident. No enlarged lymph nodes. No  evidence of pulmonary embolism. Thoracic aorta is unremarkable.  Limited images upper abdomen demonstrate calcified gallstones and  probable fatty liver infiltration. Gallbladder is otherwise  unremarkable. Imaged portions of the upper abdominal organs are  otherwise within normal limits. Postsurgical changes are noted in the  upper abdomen consistent with recent colectomy.      Impression    IMPRESSION:  1. No evidence of pulmonary embolism. Thoracic aorta is unremarkable.  2. Bibasilar lung consolidation concerning for pneumonia. Small  bilateral pleural effusions are also evident.  3. Cholelithiasis and probable fatty infiltration of the liver.  4. Postop changes upper abdomen from recent colectomy.  5. Small amount of fluid in the distal esophagus, possibly due to  reflux. No esophageal wall thickening.    NELSON HICKS MD       03/07/2018  Jeet Haddad MD

## 2018-03-07 NOTE — PROGRESS NOTES
"COLON & RECTAL SURGERY  PROGRESS NOTE    March 7, 2018  Post-op Day # 6 left colectomy with transverse colostomy for obstructing cancer.    SUBJECTIVE:  Pt feeling less nauseous and distended since placement of NG tube. He describes discomfort across his upper abdomen similar to a \"sunburn.\" He ambulated yesterday. He is urinating without difficulty.     OBJECTIVE:  Temp:  [96.6  F (35.9  C)-99.5  F (37.5  C)] 98.1  F (36.7  C)  Pulse:  [105-120] 120  Heart Rate:  [] 109  Resp:  [18-34] 18  BP: (114-155)/(70-94) 128/74  SpO2:  [92 %-97 %] 97 %    Intake/Output Summary (Last 24 hours) at 03/07/18 0748  Last data filed at 03/07/18 0525   Gross per 24 hour   Intake             1620 ml   Output             5355 ml   Net            -3735 ml       GENERAL:  Awake, alert, no acute distress, lying in bed.   HEAD: Nomocephalic atraumatic  SCLERA: anicteric  EXTREMITIES: warm and well perfused  ABDOMEN:  Soft, appropriately tender, distention improved compared to previous exam, no rebound or guarding, no peritoneal signs. Colostomy pink and viable with RRC in os and soft brown stool in bag.   INCISION:  C/d/i, staples without erythema or induration    LABS:  Lab Results   Component Value Date    WBC 6.5 03/06/2018     Lab Results   Component Value Date    HGB 13.5 03/06/2018     Lab Results   Component Value Date    HCT 38.6 03/06/2018     Lab Results   Component Value Date     03/06/2018     Last Basic Metabolic Panel:  Lab Results   Component Value Date     03/06/2018      Lab Results   Component Value Date    POTASSIUM 3.3 03/06/2018     Lab Results   Component Value Date    CHLORIDE 103 03/06/2018     Lab Results   Component Value Date    JANICE 8.0 03/06/2018     Lab Results   Component Value Date    CO2 28 03/06/2018     Lab Results   Component Value Date    BUN 11 03/06/2018     Lab Results   Component Value Date    CR 0.58 03/06/2018     Lab Results   Component Value Date     03/06/2018 "       ASSESSMENT/PLAN: POD 6 left colectomy with transverse colostomy for obstructing cancer. Due to increased productive cough and increased work of breathing on 3/6 chest CT obtained with bilateral effusions with bibasilar infiltrates suggestive of pneumonia. Abd XR 3/6 with air-filled loops of small bowel and colon, possibly mild ileus. Pt remained afebrile overnight, tachycardic 100-120s, normotensive this morning. Requiring 3L NC.      1. NPO, okay for ice chips, measure and record intake  2. TPN  3. Continue NG tube  4. Continue PCA dilaudid and PRN pain control  5. Stoma education  6. Encourage OOB, ambulate  7. Lovenox for prophylaxis  8. Antibiotics per hospitalist    Christina Gordon PA-C  [facility]  Phone: [phone]     Colon and Rectal Surgery Attending Note    Patient seen and examined independently.  Agree with above assessment and plan.  Feeling better with NGT. >2L out  abd softer, non-tender, staples in place, stoma pink with edema, productive, minimal gas  Plan   NPO, NGT,TPN  Encourage ambulation  Discussed the pathology with him yesterday will need oncology follow up as a out patient.    Joann Delcdi MD  [facility]  Office Phone # [phone]

## 2018-03-07 NOTE — CONSULTS
CLINICAL NUTRITION SERVICES  -  BRIEF NOTE    - Received Pharmacy/Nutrition to start and manage TPN consult.  - Initially with improvement in GI function and diet previously advanced.  However made NPO again yesterday d/t abdominal distention and nausea.  NGT placed - 1780 ml output yesterday.    - NPO/clears x 8 days since admission.  - PICC placed yesterday.  Discussed nutrition-related POC with hospitalist, RN, and PharmD.  - Biochemical review:   Na, mag WNL   K slightly low at 3.3 --> ok to initiate PN at first step (RN ordering replacement)   Phos slightly low at 2.1 --> ok to initiate PN at first step (RN ordering replacement)  - Labs reviewed:   IV lactated ringers at 75 ml/hr  - Goal regimen below:    - Clinimix (D15, AA5) at goal rate of 85 ml/hr x 24 hrs + 250 ml 20% lipids daily  - 2040 mls provides 306 g CHO, 102 g protein (1.3 g/kg), 1948 kcal (25 kcal/kg), GIR: 2.7, 26% kcal from fat.  - Initiate at 40 ml/hr x 24 hrs.  - Total fluids (TPN + IVFs) = 85 ml/hr or per MD      Dosing Weight 79.5 kg - adjusted weight      ASSESSED NUTRITION NEEDS PER APPROVED PRACTICE GUIDELINES:  Estimated Energy Needs: 8241-4224 kcals (24-30 Kcal/Kg)  Justification: maintenance  Estimated Protein Needs:  grams protein (1.2-1.5 g pro/Kg)  Justification: post-op  Estimated Fluid Needs: 6267-1294 mL (1 mL/Kcal)  Justification: maintenance and per provider pending fluid status    - Will continue following.  Electrolyte check tomorrow AM.      Geovanna Vu, RD, LD  Clinical Dietitian  3rd floor/ICU: [phone]  All other floors: [phone]  Weekend/holiday: [phone]

## 2018-03-07 NOTE — PLAN OF CARE
Problem: Patient Care Overview  Goal: Plan of Care/Patient Progress Review  Outcome: No Change    Ambulatory Status:  Pt up SBA w/ gait belt. Up in chair x1, walked in halls x1  Orientation: alert and oriented x4   VS:  tmax 99.5, tachy, currently on 3L O2 via NC  Pain:  Well managed, no use of PCA this shift, c/o abdominal discomfort  Resp: LS crackles. Cough frequent, productive. Sputum sent to lab.   GI:  Denies nausea.  NPO, NG to LIS. Hypoactive BS. Small amount of gas and stool in ostomy.   :  Voiding w/out difficulty.   Skin:  Midline abd incision, PRAVIN. Stoma is red, protruding.   Tx:  Zoysn  Labs:  K 3.3, replaced per protocol. Mg 1.7, replaced per protocol, re-check am.   Consults:  WOC, hem/onc  Disposition:  TBD

## 2018-03-07 NOTE — PROVIDER NOTIFICATION
"Dr. Haddad paged \"Patient c/o of itchy rash on his back. Could we get  Benadryl please.\"    New order received for Benadryl q6hr PRN per Dr. Haddad.   "

## 2018-03-07 NOTE — PROGRESS NOTES
Oncology/Hematology Follow Up Note:    Assessment and Plan:  #1 Obstructive left-sided colon cancer, stage II, s/p left hemicolectomy      It was my pleasure to see the patient in the hospital today.  He underwent left hemicolectomy for his newly diagnosed obstructive left-sided colon cancer last Thursday.  Pathology report showed a well differentiated T3 tumor.  All resected lymph nodes were negative.  Surgical margins were negative.  MMR testing currently pending.      PLAN:  1. Explained to the patient that he has stage II colon cancer.  The obstructive nature of the tumor is a negative prognostic factor, but it is well differentiated and T3 instead of T4.  Therefore, I think the risk of recurrence is too low to justify adjuvant chemotherapy  2. Will arrange follow-up as an outpatient in 3 months for the 1st surveillance visit.    #2 Health care associated pneumonia  - On antibiotics  - Managed by primary team    #3 Post-op ileus  - Currently has NGT in place  - Continue to follow colorectal surgery and nutrition recommendations.      Raj Luke M.D.  [facility]  [phone]    Subjective:    The patient has been started on antibiotics for pneumonia.  His shortness of breath and cough have improved over the past 24 hours.  Remains afebrile.  Due to intolerance of clear liquid diet, an abdominal X-ray was done, which showed evidence of post-op ileus.  An NG tube was placed.      Labs:  All labs reviewed    CBC  Recent Labs  Lab 03/07/18  1020 03/06/18  0923 03/06/18 0718 03/05/18  0657 03/04/18  0738   WBC 7.9 6.5  --   --   --  9.4   HGB 11.2* 13.5 14.0  < >  --  10.8*   MCV 97 94  --   --   --  101*    193  --   --  142* 127*   < > = values in this interval not displayed.    CMP  Recent Labs  Lab 03/07/18  1020 03/06/18  1357 03/06/18  0923 03/04/18  0738 03/03/18  0727     --  138 138 137   POTASSIUM 3.3* 3.3* 3.3* 3.6 3.9   CHLORIDE 106  --  103 106 105   CO2 29  --  28 28 27   ANIONGAP  6  --  7 4 5   GLC 97  --  113* 82 95   BUN 15  --  11 13 13   CR 0.66  --  0.58* 0.70 0.89   GFRESTIMATED >90  --  >90 >90 87   GFRESTBLACK >90  --  >90 >90 >90   JANICE 7.7*  --  8.0* 7.9* 7.7*   MAG 2.2 1.7  --   --   --    PHOS 2.1*  --   --   --   --    PROTTOTAL 5.5*  --   --   --   --    ALBUMIN 1.9*  --   --   --   --    BILITOTAL 1.2  --   --   --   --    ALKPHOS 127  --   --   --   --    *  --   --   --   --    ALT 87*  --   --   --   --        INR  Recent Labs  Lab 03/07/18  1020   INR 1.12       Blood Culture  Recent Labs  Lab 03/06/18  1914 03/06/18  1356 03/06/18  1040 03/01/18  1400   CULT No growth after 11 hours No growth after 17 hours Canceled, Test credited>10 Squamous epithelial cells/low power field indicates oral contamination. Please recollect.*  Notification of test cancellation was given Zulay Quinones RN [email or web address] @ 1346. 03/06/18 On day 5, isolated in broth only:Gram positive bacilli resembling diphtheroids*  Culture in progress  On day 2, isolated in broth only:Bacillus species, not anthracis nor cereus group*  Critical Value/Significant Value, preliminary result only, called to and read back byIsabella Valdivia RN @8007 on 3/3/18. ch.         Raj Luke MD  [facility]  3/7/2018 12:39 PM

## 2018-03-07 NOTE — PROGRESS NOTES
"[facility] Nurse Inpatient Ostomy Assessment      Assessment of ostomy and needs for:   New Colostomy      Data:   History:      Per MD note(s): Summary of Stay: Gab Holloway is a 57 year old healthy male who was admitted on 2/26/2018 with abdominal pain/bloating. Found to have colonic mass with partial/early obstruction suspicious for malignancy. He had flex sig on 2/28 which showed partially obstructing mass- prelim pathology showed adenocarcinoma. He is s/p open colectomy w/ colostomy on 3/1/2018 per CRS-awaiting return of bowel function. Hem/onc consulted for planning of future treatment and discussion about prognosis.     Type of ostomy:  Colostomy   Stoma assessment: seen thru pouch  ? Size of stoma:  2\",  beefy red, moist, edematous, firm, protruberant, RRC in pouch not Os  Mucocutaneous Junction (MCJ):  Not visualized today, barrier in place; has been intact  Peristomal skin:  Not visualized today, barrier in place; has been intact  Abdominal assessment: slightly distended  ? N/G still in place?:  Yes  Output:  small, liquid and brown      Intake/Output Summary (Last 24 hours) at 03/07/18 1552  Last data filed at 03/07/18 1342   Gross per 24 hour   Intake             2572 ml   Output             3080 ml   Net             -508 ml         Active Diet Order      Diet      NPO for Medical/Clinical Reasons Except for: No Exceptions, Ice Chips    Labs:   Recent Labs   Lab Test  03/07/18   1020   ALBUMIN  1.9*   HGB  11.2*   INR  1.12   WBC  7.9           Intervention:     Patient's chart evaluated.      Assessments done today:  Stoma, pouching system, readiness to learn, supply needs    Education: demonstration on pouch change and skin care, discussion about products    Prepared for discharge by: supplies reviewed    Pt registered for ostomy supply samples? Discharge         Assessment:     Stoma assessment: healthy, beefy red, edematous, no active bleeding noted; Pouch placed 3-5 remains " intact.      Learning needs/ comprehension: pt observed, asked questions and was very engaged with education today.       Effectiveness of current pouching/ supply plan: TBD         Plan:     Plan:   ? Current pouching system: [place] one piece flat pouch.  Additional supplies in room.   ?  Continue to prepare for d/c - will finalize supplies and d/c instructions prior to discharge  o Do WOC Nurse recommend home care:  Yes, spouse was present for extensive education prior to surgery but suspect pt will need support when discharged.

## 2018-03-07 NOTE — PLAN OF CARE
Problem: Patient Care Overview  Goal: Plan of Care/Patient Progress Review  Outcome: Improving  Patient was admitted for colon mass. POD 6 for left colectomy transverse colostomy.  Denies pain and did not use PCA during this shift. Weaned from 3L to RA stats at 92%. Continues to be tachy at times. ABD round/distendend. NG tube: 550 mL out with low interm suction. Ostomy 200 mL. Voiding well. Diet: NPO with ice chips. Denies nausea this shift. Lungs coarse, has aspiration PNA. Encouraged to use IS. Up SBA and went for 1 walk. LR running at 75 through PICC. TX: IV zosyn.     LABs- K: 3.3 replaced with 20 mEq bag (only 1 per pharmacy due to phos replacement having potassium also in it). Phos: 2.1 and replaced with IV bag over 4 hrs per MAR (still running at this time). Mg was 2.2, no replacement needed. Recheck tomorrow morning for all labs. Labs do not need to be checked 2 hrs after K replacement due to pt still has phos IV bag running and this evening he will be started on TPN which will make the lab be off, per pharmacy.

## 2018-03-08 LAB
ALBUMIN SERPL-MCNC: 1.9 G/DL (ref 3.4–5)
ALP SERPL-CCNC: 134 U/L (ref 40–150)
ALT SERPL W P-5'-P-CCNC: 63 U/L (ref 0–70)
ANION GAP SERPL CALCULATED.3IONS-SCNC: 6 MMOL/L (ref 3–14)
AST SERPL W P-5'-P-CCNC: 71 U/L (ref 0–45)
BILIRUB SERPL-MCNC: 0.8 MG/DL (ref 0.2–1.3)
BUN SERPL-MCNC: 12 MG/DL (ref 7–30)
CALCIUM SERPL-MCNC: 7.6 MG/DL (ref 8.5–10.1)
CHLORIDE SERPL-SCNC: 105 MMOL/L (ref 94–109)
CO2 SERPL-SCNC: 29 MMOL/L (ref 20–32)
CREAT SERPL-MCNC: 0.68 MG/DL (ref 0.66–1.25)
GFR SERPL CREATININE-BSD FRML MDRD: >90 ML/MIN/1.7M2
GLUCOSE BLDC GLUCOMTR-MCNC: 101 MG/DL (ref 70–99)
GLUCOSE BLDC GLUCOMTR-MCNC: 108 MG/DL (ref 70–99)
GLUCOSE BLDC GLUCOMTR-MCNC: 109 MG/DL (ref 70–99)
GLUCOSE BLDC GLUCOMTR-MCNC: 117 MG/DL (ref 70–99)
GLUCOSE BLDC GLUCOMTR-MCNC: 124 MG/DL (ref 70–99)
GLUCOSE SERPL-MCNC: 97 MG/DL (ref 70–99)
MAGNESIUM SERPL-MCNC: 1.9 MG/DL (ref 1.6–2.3)
PHOSPHATE SERPL-MCNC: 3 MG/DL (ref 2.5–4.5)
PLATELET # BLD AUTO: 215 10E9/L (ref 150–450)
POTASSIUM SERPL-SCNC: 3.2 MMOL/L (ref 3.4–5.3)
POTASSIUM SERPL-SCNC: 3.2 MMOL/L (ref 3.4–5.3)
PREALB SERPL IA-MCNC: 10 MG/DL (ref 15–45)
PROT SERPL-MCNC: 5.4 G/DL (ref 6.8–8.8)
SODIUM SERPL-SCNC: 140 MMOL/L (ref 133–144)

## 2018-03-08 PROCEDURE — 84134 ASSAY OF PREALBUMIN: CPT | Performed by: HOSPITALIST

## 2018-03-08 PROCEDURE — 25000128 H RX IP 250 OP 636: Performed by: INTERNAL MEDICINE

## 2018-03-08 PROCEDURE — 25000132 ZZH RX MED GY IP 250 OP 250 PS 637: Performed by: INTERNAL MEDICINE

## 2018-03-08 PROCEDURE — 25000128 H RX IP 250 OP 636: Performed by: COLON & RECTAL SURGERY

## 2018-03-08 PROCEDURE — 84132 ASSAY OF SERUM POTASSIUM: CPT | Performed by: HOSPITALIST

## 2018-03-08 PROCEDURE — 12000000 ZZH R&B MED SURG/OB

## 2018-03-08 PROCEDURE — 83735 ASSAY OF MAGNESIUM: CPT | Performed by: HOSPITALIST

## 2018-03-08 PROCEDURE — 00000146 ZZHCL STATISTIC GLUCOSE BY METER IP

## 2018-03-08 PROCEDURE — 25000125 ZZHC RX 250: Performed by: COLON & RECTAL SURGERY

## 2018-03-08 PROCEDURE — 99231 SBSQ HOSP IP/OBS SF/LOW 25: CPT | Performed by: INTERNAL MEDICINE

## 2018-03-08 PROCEDURE — 80053 COMPREHEN METABOLIC PANEL: CPT | Performed by: HOSPITALIST

## 2018-03-08 PROCEDURE — 40000902 ZZH STATISTIC WOC PT EDUCATION, 16-30 MIN

## 2018-03-08 PROCEDURE — 84100 ASSAY OF PHOSPHORUS: CPT | Performed by: HOSPITALIST

## 2018-03-08 PROCEDURE — 99207 ZZC CDG-MDM COMPONENT: MEETS LOW - DOWN CODED: CPT | Performed by: INTERNAL MEDICINE

## 2018-03-08 PROCEDURE — 25000125 ZZHC RX 250: Performed by: INTERNAL MEDICINE

## 2018-03-08 PROCEDURE — 85049 AUTOMATED PLATELET COUNT: CPT | Performed by: HOSPITALIST

## 2018-03-08 RX ORDER — POTASSIUM CHLORIDE 29.8 MG/ML
20 INJECTION INTRAVENOUS
Status: DISCONTINUED | OUTPATIENT
Start: 2018-03-08 | End: 2018-03-14 | Stop reason: HOSPADM

## 2018-03-08 RX ORDER — POTASSIUM CL/LIDO/0.9 % NACL 10MEQ/0.1L
10 INTRAVENOUS SOLUTION, PIGGYBACK (ML) INTRAVENOUS
Status: DISCONTINUED | OUTPATIENT
Start: 2018-03-08 | End: 2018-03-14 | Stop reason: HOSPADM

## 2018-03-08 RX ORDER — POTASSIUM CHLORIDE 1.5 G/1.58G
20-40 POWDER, FOR SOLUTION ORAL
Status: DISCONTINUED | OUTPATIENT
Start: 2018-03-08 | End: 2018-03-14 | Stop reason: HOSPADM

## 2018-03-08 RX ORDER — POTASSIUM CHLORIDE 1500 MG/1
20-40 TABLET, EXTENDED RELEASE ORAL
Status: DISCONTINUED | OUTPATIENT
Start: 2018-03-08 | End: 2018-03-14 | Stop reason: HOSPADM

## 2018-03-08 RX ORDER — POTASSIUM CHLORIDE 7.45 MG/ML
10 INJECTION INTRAVENOUS
Status: DISCONTINUED | OUTPATIENT
Start: 2018-03-08 | End: 2018-03-14 | Stop reason: HOSPADM

## 2018-03-08 RX ORDER — SODIUM CHLORIDE, SODIUM LACTATE, POTASSIUM CHLORIDE, CALCIUM CHLORIDE 600; 310; 30; 20 MG/100ML; MG/100ML; MG/100ML; MG/100ML
INJECTION, SOLUTION INTRAVENOUS CONTINUOUS
Status: DISCONTINUED | OUTPATIENT
Start: 2018-03-08 | End: 2018-03-14 | Stop reason: HOSPADM

## 2018-03-08 RX ADMIN — TAZOBACTAM SODIUM AND PIPERACILLIN SODIUM 3.38 G: 375; 3 INJECTION, SOLUTION INTRAVENOUS at 09:35

## 2018-03-08 RX ADMIN — TAZOBACTAM SODIUM AND PIPERACILLIN SODIUM 3.38 G: 375; 3 INJECTION, SOLUTION INTRAVENOUS at 22:03

## 2018-03-08 RX ADMIN — ACETAMINOPHEN 1000 MG: 10 INJECTION, SOLUTION INTRAVENOUS at 16:45

## 2018-03-08 RX ADMIN — ENOXAPARIN SODIUM 40 MG: 40 INJECTION SUBCUTANEOUS at 12:37

## 2018-03-08 RX ADMIN — POTASSIUM CHLORIDE 20 MEQ: 29.8 INJECTION, SOLUTION INTRAVENOUS at 17:13

## 2018-03-08 RX ADMIN — PANTOPRAZOLE SODIUM 40 MG: 40 INJECTION, POWDER, FOR SOLUTION INTRAVENOUS at 09:27

## 2018-03-08 RX ADMIN — PHENOL 1 ML: 1.5 LIQUID ORAL at 21:35

## 2018-03-08 RX ADMIN — Medication 2 G: at 12:39

## 2018-03-08 RX ADMIN — I.V. FAT EMULSION 250 ML: 20 EMULSION INTRAVENOUS at 22:05

## 2018-03-08 RX ADMIN — PHENOL 1 ML: 1.5 LIQUID ORAL at 20:05

## 2018-03-08 RX ADMIN — TAZOBACTAM SODIUM AND PIPERACILLIN SODIUM 3.38 G: 375; 3 INJECTION, SOLUTION INTRAVENOUS at 02:15

## 2018-03-08 RX ADMIN — ACETAMINOPHEN 1000 MG: 10 INJECTION, SOLUTION INTRAVENOUS at 01:21

## 2018-03-08 RX ADMIN — TAZOBACTAM SODIUM AND PIPERACILLIN SODIUM 3.38 G: 375; 3 INJECTION, SOLUTION INTRAVENOUS at 14:11

## 2018-03-08 RX ADMIN — POTASSIUM CHLORIDE 20 MEQ: 29.8 INJECTION, SOLUTION INTRAVENOUS at 15:00

## 2018-03-08 ASSESSMENT — PAIN DESCRIPTION - DESCRIPTORS: DESCRIPTORS: RADIATING

## 2018-03-08 ASSESSMENT — ACTIVITIES OF DAILY LIVING (ADL)
ADLS_ACUITY_SCORE: 11

## 2018-03-08 NOTE — PROGRESS NOTES
Focus: ostomy  D; pt reports feeling very sleepy today and just wants to rest.   Stoma is pink and functioning; 150cc effluent emptied, brownish liquid with small amount of bloody clot threads, intact pouch  I: discussion and review, empty pouch  A/P: pt not able to review and discuss education on stoma today, WOC will return in am

## 2018-03-08 NOTE — PLAN OF CARE
Problem: Patient Care Overview  Goal: Plan of Care/Patient Progress Review  Outcome: Improving    Magnesium and potassium replaced per protocal. Pt complained of pain rated 3/10 in abdomen that was radiating to left side. Pt has PCA pump but has not used any medication during the shift. Abdomen was distended and bowel sounds were faint and hypoactive. Pt reported to be passing flatus. Incision was approximated and open to air. Pt up with a stand by assist, walked in the halls and uses urinal at bedside. Continues on TPN and NG to intermittent-suction. Pt reports having trouble sleeping - this was discussed with MD who stated he would add something for sleep.

## 2018-03-08 NOTE — PROGRESS NOTES
"COLON & RECTAL SURGERY  PROGRESS NOTE    March 8, 2018  Post-op Day # 7, left colectomy with transverse colostomy for obstructing cancer.     SUBJECTIVE:  Patient reports feeling better with NG in.  Discouraged that he had been doing well and feels he \"took a step back.\"  Denies abdominal pain.  Stoma functioning.    OBJECTIVE:  Temp:  [96.1  F (35.6  C)-98.2  F (36.8  C)] 98.2  F (36.8  C)  Heart Rate:  [85-98] 96  Resp:  [16-18] 16  BP: (125-139)/(70-83) 139/83  SpO2:  [91 %-96 %] 95 %    Intake/Output Summary (Last 24 hours) at 03/08/18 0716  Last data filed at 03/08/18 0500   Gross per 24 hour   Intake             2498 ml   Output             3430 ml   Net             -932 ml       GENERAL:  Awake, alert, no acute distress, resting in bed  HEAD: Normocephalic atraumatic  SCLERA: Anicteric  EXTREMITIES: Warm and well perfused  ABDOMEN:  Soft, appropriately tender, somewhat distended. No guarding, rigidity, or peritoneal signs. Colostomy pink and viable; RRC in place with brown output in bag  INCISION:  C/d/i, midline staples; no erythema, fluctuance, or induration    LABS:  Lab Results   Component Value Date    WBC 7.9 03/07/2018     Lab Results   Component Value Date    HGB 11.2 03/07/2018     Lab Results   Component Value Date    HCT 32.3 03/07/2018     Lab Results   Component Value Date     03/08/2018     Last Basic Metabolic Panel:  Lab Results   Component Value Date     03/08/2018      Lab Results   Component Value Date    POTASSIUM 3.2 03/08/2018     Lab Results   Component Value Date    CHLORIDE 105 03/08/2018     Lab Results   Component Value Date    JANICE 7.6 03/08/2018     Lab Results   Component Value Date    CO2 29 03/08/2018     Lab Results   Component Value Date    BUN 12 03/08/2018     Lab Results   Component Value Date    CR 0.68 03/08/2018     Lab Results   Component Value Date    GLC 97 03/08/2018       ASSESSMENT/PLAN: POD 7 left colectomy with transverse colostomy for obstructing " cancer. 3/6 chest CT with bilateral effusions with bibasilar infiltrates suggestive of pneumonia. Abd XR 3/6 with air-filled loops of small bowel and colon, possibly mild ileus. Afebrile, VSS and not requiring supp O2 overnight.  1700 cc out NGT.     1. Continue NPO, NGT, TPN  2. Continue PCA dilaudid and PRN pain control  3. Stoma education  4. Encourage OOB, ambulate  5. Lovenox for prophylaxis  6. Antibiotics for PNA per hospitalist      Samantha Lozano PA-C  [facility]  Phone: [phone]    CRS Staff  Seen and examined.  Agree with above.  Has an ileus.  Abdomen soft, wound looks good.  Some output via stoma.  Cont NG and TPN.    Jorge Lan MD  Colorectal Surgery  [phone] (office)  [phone] (pager)  [email or web address]

## 2018-03-08 NOTE — PLAN OF CARE
Problem: Patient Care Overview  Goal: Plan of Care/Patient Progress Review  Outcome: Improving  A&O x4. VSS, afebrile on RA. SBA when oob. LS diminished, denies SOB. Denies pain. BS + x4, faint, denies N/V, NPO except ice chips, NG to LIS, output green stomach like contents, 250 cc out this shift. Dbl lumen PICC Rt arm, infusing. TPN @ 40 ml/hr, LR @ 45 ml/hr for total fluid of 85 ml/hr. Lipids @ 20.8. Dilaudid PCA, pt has not used this shift.  and 117 this shift. Incision on abdomen open to air, stapled, CDI. Ostomy with good output 200 cc this shift, stoma red, protruding. Continue with plan of care.

## 2018-03-08 NOTE — PROGRESS NOTES
Oncology/Hematology Follow Up Note:    Assessment and Plan:  #1 Obstructive left-sided colon cancer, stage II, s/p left hemicolectomy      It was my pleasure to see the patient in the hospital today.  He underwent left hemicolectomy for his newly diagnosed obstructive left-sided colon cancer last Thursday.  Pathology report showed a grade 2 T3 tumor.  All resected lymph nodes were negative.  Surgical margins were negative.  MMR proficient      PLAN:  1. Explained to the patient that he has stage II colon cancer.  The obstructive nature of the tumor is a negative prognostic factor, but it is moderately differentiated and T3 instead of T4.  There was no perineural or lymphovascular invasion.  Therefore, I think the risk of recurrence is too low to justify adjuvant chemotherapy  2. Will arrange follow-up as an outpatient in 3 months for the 1st surveillance visit.     #2 Health care associated pneumonia  - On Zosyn  - Managed by primary team     #3 Post-op ileus  - Currently has NGT in place  - On TPN  - Continue to follow colorectal surgery and nutrition recommendations.      Raj Luke M.D.  [facility]  [phone]    Subjective:    Shortness of breath and cough continue to improve.  Now off supplemental oxygen.  Remains afebrile.    Due to failed clear liquid diet trial, an NGT was placed, and TPN was started.      Labs:  All labs reviewed    CBC  Recent Labs  Lab 03/08/18  0545 03/07/18  1020 03/06/18  0923 03/06/18  0718  03/04/18  0738   WBC  --  7.9 6.5  --   --  9.4   HGB  --  11.2* 13.5 14.0  < > 10.8*   MCV  --  97 94  --   --  101*    204 193  --   < > 127*   < > = values in this interval not displayed.    CMP  Recent Labs  Lab 03/08/18  0545 03/07/18  1020 03/06/18  1357 03/06/18  0923 03/04/18  0738    141  --  138 138   POTASSIUM 3.2* 3.3* 3.3* 3.3* 3.6   CHLORIDE 105 106  --  103 106   CO2 29 29  --  28 28   ANIONGAP 6 6  --  7 4   GLC 97 97  --  113* 82   BUN 12 15  --  11 13   CR  0.68 0.66  --  0.58* 0.70   GFRESTIMATED >90 >90  --  >90 >90   GFRESTBLACK >90 >90  --  >90 >90   JANICE 7.6* 7.7*  --  8.0* 7.9*   MAG 1.9 2.2 1.7  --   --    PHOS 3.0 2.1*  --   --   --    PROTTOTAL 5.4* 5.5*  --   --   --    ALBUMIN 1.9* 1.9*  --   --   --    BILITOTAL 0.8 1.2  --   --   --    ALKPHOS 134 127  --   --   --    AST 71* 150*  --   --   --    ALT 63 87*  --   --   --        INR  Recent Labs  Lab 03/07/18  1020   INR 1.12       Blood Culture  Recent Labs  Lab 03/06/18  2230 03/06/18  1914 03/06/18  1356 03/06/18  1040 03/01/18  1400   CULT Moderate growthNormal sarah to date No growth after 2 days No growth after 2 days Canceled, Test credited>10 Squamous epithelial cells/low power field indicates oral contamination. Please recollect.*  Notification of test cancellation was given Zulay Quinones RN [email or web address] @ 1346.cg 03/06/18 On day 5, isolated in broth only:Propionibacterium (Cutibacterium) acnes*  Susceptibility testing of Propionibacterium species is not done from this source. Our antibiogram indicates that Propionibacterum species is susceptible to penicillin and cefotaxime and most are susceptible to clindamycin.Matilde Leblanc M.D., Medical Director  On day 2, isolated in broth only:Bacillus species, not anthracis nor cereus group*  Critical Value/Significant Value, preliminary result only, called to and read back byIsabella Valdivia, RN @1212 on 3/3/18. .         Raj Luke MD  [facility]  3/8/2018 10:35 AM

## 2018-03-08 NOTE — PROGRESS NOTES
"SPIRITUAL HEALTH SERVICES  SPIRITUAL ASSESSMENT Progress Note  [facility] Med. Surg. 5    PRIMARY FOCUS:     Goals of care    Emotional/spiritual/Catholic distress    Support for coping    ILLNESS CIRCUMSTANCES:   Reviewed documentation. Oriented pt Yoel to  services and offered reflective conversation, which integrated elements of illness and family narratives.     Context of Serious Illness/Symptom(s) - Yoel reported that he had surgery for stage II colon cancer.  He had a \"setback\" with PNA, which necessitated an NG tube and NPO.    Persons/Resources Involved - He named his wife, four sons and their families, mother, and sister.    DISTRESS:     Emotional/Existential/Relational Distress - none expressed.  He reported that he will not need chemo or radiation but ongoing monitoring with care provider.    Spiritual/Tenriism Distress - none named.    Social/Cultural/Economic Distress - none identified.  Yoel works as a .    SPIRIT (Coping):     Mandaeism/Margarita - Yoel talked about developing more of a relationship with God; he did not mention an affiliation with a margarita community.    Spiritual Practice(s) - not discussed.    Emotional/Existential/Relational Connections -   o Yoel shared that he cj by \"staying positive\" and reflected that he would like to make some lifestyle changes, including changing his work and being more attentive to his healthcare (i.e routine check-ups/preventative care).  o He enjoys walking, camping, hunting, and fishing.  o He has four grandson and is expecting another grandchild in a day or two.    SENSE-MAKING:    Goals of Care - not discussed.  Informed him how to request another  visit per his needs.    Meaning/Sense-Making - Yoel reflected on how he cj with difficulties and identified some lifestyle changes.    PLAN: No further plans; I and other chaplains remain available per pt/family request.    Tino Kramer M.Div., [facility]  Staff   Pager [phone]  "

## 2018-03-08 NOTE — PLAN OF CARE
Problem: Patient Care Overview  Goal: Plan of Care/Patient Progress Review  Outcome: No Change  Pt A&O, up in halls with SBA, Tier A activity level. VSS, afebrile + sats maintained on RA. Denies pain, PCA dilaudid, pt not using this evening. Pt's wife concerned about jaundice, LFTs elevated, note left for rounding MD and ofirmev held until addressed. Abd incision with staples, CDI. BS+, faint, +flatus. Ostomy with good output, 280cc, emptied per nursing staff. NG to LIS, 550cc out, -n/v. TPN initiated, . C/o itching rash on back, PRN benadryl x1. Continues IV zosyn.

## 2018-03-08 NOTE — PROGRESS NOTES
[facility]  Hospitalist Progress Note  Patient Name: Gab Holloway    MRN: [id number]  Provider:  Jeet Haddad MD  Date:03/08/2018      Initial presenting complaint/issue to hospital (Diagnosis): abd pain     Summary of Stay: Gab Holloway is a 57 year old healthy male who was admitted on 2/26/2018 with abdominal pain/bloating. Found to have colonic mass with partial/early obstruction suspicious for malignancy. He had flex sig on 2/28 which showed partially obstructing mass- prelim pathology showed adenocarcinoma. He is s/p open colectomy w/ colostomy on 3/1/2018 per CRS-awaiting return of bowel function. Hem/onc consulted for planning of future treatment and discussion about prognosis.      Early 3/6 he developed increased productive cough and increased work of breathing.  Chest x-ray showed low lung volumes, and subsequent CT scan was negative for pulmonary embolism but revealed bilateral effusions with bibasilar infiltrates suggestive of pneumonia.  He has been started on IV Zosyn pending blood and sputum cultures.  He did have NG tube replaced as well with large output which also seemed to make him feel better.  He now has been weaned to room air.    He continues to have an ileus, and has been started on TPN as per colorectal surgery.          Assessment and Plan:      Colonic obstruction 2/2 partially obstructing adenocarcinoma of colon s/p partial colectomy w/ colostomy on 3/1/2018. Pt presented with abd pain and e/o colonic obstruction 2/2 colonic mass.Sigmoidoscopy on 2/28, malignant obstructing mass per path.  Suspect ileus at this point as well.  --appreciate CRS consult, s/p colectomy on 3/1 without any immediate complication  --Consulted hem/onc  --on dilaudid PCA, management deferred to Surgery  --NG per Surgery  --PICC Placed, TPN started 3/7.    Acute hypoxemic respiratory failure: This primarily manifested itself on 3/6.  He has had productive cough of green  "sputum, tachypnea, and hypoxia.  CT chest was negative for PE but does show bilateral infiltrates concerning for pneumonia.  He has been started on IV Zosyn pending blood and sputum cultures.  As of 3/8 he has been weaned to room air and cough is improved    +ve peritoneal wash culture: growing gram +ve rods, susp he has had poor sleep, ect contaminant however I am not sure about its significance - ?contaminant vs leak  --defer to CRS, current plan appears to be no abx for this indication.    New dx of Adenocarcinoma of colon: oncology on board, will need close outpatient follow up.    Asymptomatic cholelithiasis.         # Pain Assessment:  Managed by surgical team.    DVT Prophylaxis:  - -lovenox post op   Code Status: Full Code  Discharge Dispo: home  Estimated Disch Date / # of Days until Discharge: TBD-pending return of bowel function and respiratory status.  ?2 more days.        Interval History:      Feeling better today after gastric decompression and abx  He has had poor sleep, and requests to have as needed IV Ativan available in the evening as his mind races and he feels anxious.  NG tube still in place  Cough is better, and he has been weaned to room air  Has had some output into his ostomy      Data reviewed today: I reviewed all new labs and imaging reports over the last 24 hours. I personally reviewed no images or EKG's today.         Physical Exam:      Last Vital Signs:  /83 (BP Location: Left arm)  Pulse 120  Temp 98.2  F (36.8  C) (Oral)  Resp 16  Ht 1.778 m (5' 10\")  Wt 97.2 kg (214 lb 3.2 oz)  SpO2 95%  BMI 30.73 kg/m2  GENERAL: No apparent distress. Awake, alert, and fully oriented.  HEENT: Normocephalic, atraumatic. Extraocular movements intact.  NG tube in place  CARDIOVASCULAR: Regular rate and rhythm without murmurs or rubs. No S3.  PULMONARY: diminished bilaterally, increased work of breathing, productive cough.  ABDOMINAL: Soft, non-tender,  distended. Bowel sounds absent. No " hepatosplenomegaly.  EXTREMITIES: No cyanosis or clubbing. No edema.  NEUROLOGICAL: CN 2-12 grossly intact, no focal neurological deficits.  DERMATOLOGICAL: No rash, ulcer, ecchymoses, jaundice.  PSYCH: appropriate           Medications:      All current medications were reviewed.         Data:      All new lab and imaging data was reviewed.   Data       Recent Labs  Lab 03/08/18  0545 03/07/18  1020 03/06/18  0923 03/06/18  0718  03/04/18  0738   WBC  --  7.9 6.5  --   --  9.4   HGB  --  11.2* 13.5 14.0  < > 10.8*   HCT  --  32.3* 38.6*  --   --  33.5*   MCV  --  97 94  --   --  101*    204 193  --   < > 127*   < > = values in this interval not displayed.    Recent Labs  Lab 03/08/18  0545 03/07/18  1020 03/06/18  1357 03/06/18  0923    141  --  138   POTASSIUM 3.2* 3.3* 3.3* 3.3*   CHLORIDE 105 106  --  103   CO2 29 29  --  28   ANIONGAP 6 6  --  7   GLC 97 97  --  113*   BUN 12 15  --  11   CR 0.68 0.66  --  0.58*   GFRESTIMATED >90 >90  --  >90   GFRESTBLACK >90 >90  --  >90   JANICE 7.6* 7.7*  --  8.0*       No results found for this or any previous visit (from the past 24 hour(s)).    03/08/2018  Jeet Haddad MD

## 2018-03-08 NOTE — PROGRESS NOTES
CLINICAL NUTRITION SERVICES - REASSESSMENT NOTE    Recommendations Ordered by Registered Dietitian (RD):   OK to increase TPN to goal tonight:   Clinimix (D15, AA5) at goal rate of 85 ml/hr x 24 hrs + 250 ml 20% lipids daily   - 2040 mls provides 306 g CHO, 102 g protein (1.3 g/kg), 1948 kcal (25 kcal/kg), GIR: 2.7, 26% kcal from fat.      EVALUATION OF PROGRESS TOWARD GOALS   Diet:  NPO x9 days, with failed trial CLD on 3/5 r/t increased abdominal distention and nausea.   Pt repots feeling better with NGT to LIS, denies questions regarding TPN.     Nutrition Support: PN start via PICC 3/7. PN currently running Clinimix @ 40 ml/hr + 250 ml 20% lipid daily providing 1182 kcal (14.9 kcal/kg, 59% energy needs), and 48 g pro (0.6 g/kg, 51% protein needs) with goal as follows:     Nutrition Support Parenteral:  Type of Access: PICC  Parenteral Frequency:  Continuous  Parenteral Regimen: Clinimix D15 AA5 @ 85 ml/hr + 250 mL 20% lipids daily.   Total Parenteral Provisions: 2040 mls provides 306 g CHO, 102 g protein (1.3 g/kg), 1948 kcal (25 kcal/kg), GIR: 2.7, 26% kcal from fat.  Total Fluids: 85 mL/hr or per provider pending fluid status    Tolerance:   POD #7 ostomy placement  Labs -   Phos 3.0, Mg 1.9 - NL  K 3.2 (L), OK from nutrition perspective   BGs <150  No baseline TAG available.     Meds -   LR @ 75 ml/hr --> total fluid to meet 85 mL/hr or per MD  SSI    I/O -   Ostomy outputs 3/7: 580 mL, 3/6: 400 mL, 3/5: 225 mL  NG to LIS 3/7: 1550 mL, 3/6: 1780 mL  Weight up ~13# since admit (?fluid related)   Vitals:    02/27/18 0208 03/08/18 0500   Weight: 91.2 kg (201 lb 1.6 oz) 97.2 kg (214 lb 3.2 oz)       Dosing Weight 79.5 kg - adjusted weight      ASSESSED NUTRITION NEEDS PER APPROVED PRACTICE GUIDELINES:  Estimated Energy Needs: 1988-2385 kcals (25-30 Kcal/Kg)  Justification: maintenance  Estimated Protein Needs:  grams protein (1.2-1.5 g pro/Kg)  Justification: post-op  Estimated Fluid Needs: 7512-7444 mL (1  "mL/Kcal)  Justification: maintenance and per provider pending fluid status    NEW FINDINGS:   - Per CRS pt feeling better with NG in. Feeling discouraged about \"Step back\" but denies abdominal pain.     Previous Goals:   Diet advancement past clears w/in 48 hrs.  Evaluation: Not met -- now requiring TPN to meet nutrient needs    Previous Nutrition Diagnosis:   Inadequate oral intake related to altered GI function post-op as evidenced by meeting <50% needs x 7 days since admission, diet advancement to clears w/in the past 24 hrs.   Evaluation: Declining, updated below    MALNUTRITION  % Weight Loss:  Weight loss does not meet criteria for malnutrition   % Intake:  </= 50% for >/= 5 days (severe malnutrition)   Subcutaneous Fat Loss:  None observed  Muscle Loss:  None observed  Fluid Retention:  None noted     Malnutrition Diagnosis: Patient does not meet two of the above criteria necessary for diagnosing malnutrition    CURRENT NUTRITION DIAGNOSIS  Inadequate protein-energy intake related to inadequate oral intake due to GI dysfunction with TPN requirement not yet at goal as evidenced by NPO diet order with intakes <25% estimated needs x9+ days and PN start yesterday meeting <60% energy and protein needs x1 day with plan for increase to goal tonight.     INTERVENTIONS  Recommendations / Nutrition Prescription  Increase TPN provisions this evening to goal:   Clinimix D15 AA5 @ 85 ml/hr + 250 mL 20% lipids daily.   Total Parenteral Provisions: 2040 mls provides 306 g CHO, 102 g protein (1.3 g/kg), 1948 kcal (25 kcal/kg), GIR: 2.7, 26% kcal from fat.  Total Fluids: 85 mL/hr or per provider pending fluid status  DW: 79.5 kg    Diet per CRS once appropriate     Implementation  PN Schedule: orders updated to goal as above  Collaboration and Referral of Nutrition care: Pt POC discussed in interdisciplinary rounds, TPN increase d/w PharmD  Nutrition Education: TPN plan d/w patient. Denies questions at this time. "     Goals  TPN to meet % estimated needs while NPO.       MONITORING AND EVALUATION:  Progress towards goals will be monitored and evaluated per protocol and Practice Guidelines      Anjali Peace RD, LD  3rd floor/ICU: [phone]  All other floors: [phone]  Weekend/holiday: [phone]  Office: [phone]

## 2018-03-09 ENCOUNTER — APPOINTMENT (OUTPATIENT)
Dept: CT IMAGING | Facility: CLINIC | Age: 58
DRG: 329 | End: 2018-03-09
Attending: PHYSICIAN ASSISTANT
Payer: COMMERCIAL

## 2018-03-09 LAB
ANION GAP SERPL CALCULATED.3IONS-SCNC: 5 MMOL/L (ref 3–14)
BACTERIA SPEC CULT: ABNORMAL
BACTERIA SPEC CULT: ABNORMAL
BUN SERPL-MCNC: 11 MG/DL (ref 7–30)
CALCIUM SERPL-MCNC: 7.6 MG/DL (ref 8.5–10.1)
CHLORIDE SERPL-SCNC: 107 MMOL/L (ref 94–109)
CO2 SERPL-SCNC: 27 MMOL/L (ref 20–32)
CREAT SERPL-MCNC: 0.63 MG/DL (ref 0.66–1.25)
GFR SERPL CREATININE-BSD FRML MDRD: >90 ML/MIN/1.7M2
GLUCOSE BLDC GLUCOMTR-MCNC: 112 MG/DL (ref 70–99)
GLUCOSE BLDC GLUCOMTR-MCNC: 124 MG/DL (ref 70–99)
GLUCOSE BLDC GLUCOMTR-MCNC: 128 MG/DL (ref 70–99)
GLUCOSE BLDC GLUCOMTR-MCNC: 132 MG/DL (ref 70–99)
GLUCOSE BLDC GLUCOMTR-MCNC: 145 MG/DL (ref 70–99)
GLUCOSE SERPL-MCNC: 120 MG/DL (ref 70–99)
MAGNESIUM SERPL-MCNC: 2.3 MG/DL (ref 1.6–2.3)
PHOSPHATE SERPL-MCNC: 2.6 MG/DL (ref 2.5–4.5)
POTASSIUM SERPL-SCNC: 3.6 MMOL/L (ref 3.4–5.3)
PROCALCITONIN SERPL-MCNC: 0.45 NG/ML
SODIUM SERPL-SCNC: 139 MMOL/L (ref 133–144)
SPECIMEN SOURCE: ABNORMAL

## 2018-03-09 PROCEDURE — 84145 PROCALCITONIN (PCT): CPT | Performed by: HOSPITALIST

## 2018-03-09 PROCEDURE — G0463 HOSPITAL OUTPT CLINIC VISIT: HCPCS

## 2018-03-09 PROCEDURE — 80048 BASIC METABOLIC PNL TOTAL CA: CPT | Performed by: COLON & RECTAL SURGERY

## 2018-03-09 PROCEDURE — 74178 CT ABD&PLV WO CNTR FLWD CNTR: CPT

## 2018-03-09 PROCEDURE — 99232 SBSQ HOSP IP/OBS MODERATE 35: CPT | Performed by: HOSPITALIST

## 2018-03-09 PROCEDURE — 25000128 H RX IP 250 OP 636: Performed by: COLON & RECTAL SURGERY

## 2018-03-09 PROCEDURE — 25000128 H RX IP 250 OP 636: Performed by: HOSPITALIST

## 2018-03-09 PROCEDURE — 12000000 ZZH R&B MED SURG/OB

## 2018-03-09 PROCEDURE — 25000128 H RX IP 250 OP 636: Performed by: INTERNAL MEDICINE

## 2018-03-09 PROCEDURE — 84145 PROCALCITONIN (PCT): CPT | Performed by: COLON & RECTAL SURGERY

## 2018-03-09 PROCEDURE — 25000131 ZZH RX MED GY IP 250 OP 636 PS 637: Performed by: HOSPITALIST

## 2018-03-09 PROCEDURE — 25000125 ZZHC RX 250: Performed by: COLON & RECTAL SURGERY

## 2018-03-09 PROCEDURE — 00000146 ZZHCL STATISTIC GLUCOSE BY METER IP

## 2018-03-09 PROCEDURE — 25000125 ZZHC RX 250: Performed by: INTERNAL MEDICINE

## 2018-03-09 PROCEDURE — 25000125 ZZHC RX 250: Performed by: HOSPITALIST

## 2018-03-09 PROCEDURE — 83735 ASSAY OF MAGNESIUM: CPT | Performed by: COLON & RECTAL SURGERY

## 2018-03-09 PROCEDURE — 84100 ASSAY OF PHOSPHORUS: CPT | Performed by: COLON & RECTAL SURGERY

## 2018-03-09 PROCEDURE — 99207 ZZC CDG-MDM COMPONENT: MEETS LOW - DOWN CODED: CPT | Performed by: HOSPITALIST

## 2018-03-09 RX ORDER — IOPAMIDOL 755 MG/ML
500 INJECTION, SOLUTION INTRAVASCULAR ONCE
Status: COMPLETED | OUTPATIENT
Start: 2018-03-09 | End: 2018-03-09

## 2018-03-09 RX ADMIN — I.V. FAT EMULSION 250 ML: 20 EMULSION INTRAVENOUS at 20:20

## 2018-03-09 RX ADMIN — POTASSIUM CHLORIDE 20 MEQ: 29.8 INJECTION, SOLUTION INTRAVENOUS at 01:15

## 2018-03-09 RX ADMIN — ACETAMINOPHEN 1000 MG: 10 INJECTION, SOLUTION INTRAVENOUS at 08:32

## 2018-03-09 RX ADMIN — PANTOPRAZOLE SODIUM 40 MG: 40 INJECTION, POWDER, FOR SOLUTION INTRAVENOUS at 08:24

## 2018-03-09 RX ADMIN — INSULIN ASPART 1 UNITS: 100 INJECTION, SOLUTION INTRAVENOUS; SUBCUTANEOUS at 01:47

## 2018-03-09 RX ADMIN — IOPAMIDOL 100 ML: 755 INJECTION, SOLUTION INTRAVENOUS at 15:26

## 2018-03-09 RX ADMIN — POTASSIUM CHLORIDE 20 MEQ: 29.8 INJECTION, SOLUTION INTRAVENOUS at 02:32

## 2018-03-09 RX ADMIN — ENOXAPARIN SODIUM 40 MG: 40 INJECTION SUBCUTANEOUS at 12:16

## 2018-03-09 RX ADMIN — ASCORBIC ACID, VITAMIN A PALMITATE, CHOLECALCIFEROL, THIAMINE HYDROCHLORIDE, RIBOFLAVIN-5 PHOSPHATE SODIUM, PYRIDOXINE HYDROCHLORIDE, NIACINAMIDE, DEXPANTHENOL, ALPHA-TOCOPHEROL ACETATE, VITAMIN K1, FOLIC ACID, BIOTIN, CYANOCOBALAMIN: 200; 3300; 200; 6; 3.6; 6; 40; 15; 10; 150; 600; 60; 5 INJECTION, SOLUTION INTRAVENOUS at 10:23

## 2018-03-09 RX ADMIN — TAZOBACTAM SODIUM AND PIPERACILLIN SODIUM 3.38 G: 375; 3 INJECTION, SOLUTION INTRAVENOUS at 04:19

## 2018-03-09 RX ADMIN — SODIUM CHLORIDE 66 ML: 9 INJECTION, SOLUTION INTRAVENOUS at 15:27

## 2018-03-09 RX ADMIN — PHENOL 1 ML: 1.5 LIQUID ORAL at 23:04

## 2018-03-09 RX ADMIN — ACETAMINOPHEN 1000 MG: 10 INJECTION, SOLUTION INTRAVENOUS at 00:46

## 2018-03-09 RX ADMIN — PHENOL 1 ML: 1.5 LIQUID ORAL at 20:20

## 2018-03-09 ASSESSMENT — ACTIVITIES OF DAILY LIVING (ADL)
ADLS_ACUITY_SCORE: 12
ADLS_ACUITY_SCORE: 11
ADLS_ACUITY_SCORE: 12
ADLS_ACUITY_SCORE: 11

## 2018-03-09 NOTE — PLAN OF CARE
Problem: Patient Care Overview  Goal: Plan of Care/Patient Progress Review  Outcome: Improving  Pt alert and oriented x4.  Independent standby assist in room. Denies any pain and did not use PCA medication on this shift. Complaints of shortness of breath with exertion. Abdomen is distend and bowel sounds are hypoactive. Awaiting transport to CT. Incision is CDI with no drainage. Ostomy output 20ml and NG attached to low-intermittent suction. Blood glucose within range and no inulin adjustment was needed. There is a diffuse pustule rash on pts back. MD DC zosyn.

## 2018-03-09 NOTE — PROGRESS NOTES
COLON & RECTAL SURGERY  PROGRESS NOTE    March 9, 2018  Post-op Day # 8, left colectomy with transverse colostomy for obstructing cancer.     SUBJECTIVE:  Patient reports he feels ok.  Denies abdominal pain.  Still copious bilious output from NG.  Minimal output from stoma - scant brown liquid at bottom of bag, no gas. RRC fell out yesterday.    OBJECTIVE:  Temp:  [97.5  F (36.4  C)-97.9  F (36.6  C)] 97.7  F (36.5  C)  Pulse:  [81] 81  Heart Rate:  [83-91] 83  Resp:  [18-20] 18  BP: (119-131)/(70-79) 128/77  SpO2:  [94 %-95 %] 94 %    Intake/Output Summary (Last 24 hours) at 03/09/18 1104  Last data filed at 03/09/18 0841   Gross per 24 hour   Intake             2665 ml   Output             3075 ml   Net             -410 ml       GENERAL:  Awake, alert, no acute distress, resting in bed  HEAD: Normocephalic atraumatic  SCLERA: Anicteric  EXTREMITIES: Warm and well perfused  ABDOMEN:  Soft, appropriately tender, distended. No guarding, rigidity, or peritoneal signs. Dr. Lan replaced RRC in stoma os. Scant brown liquid in bag, no gas.  INCISION:  C/d/i, staples midline without erythema    LABS:  Lab Results   Component Value Date    WBC 7.9 03/07/2018     Lab Results   Component Value Date    HGB 11.2 03/07/2018     Lab Results   Component Value Date    HCT 32.3 03/07/2018     Lab Results   Component Value Date     03/08/2018     Last Basic Metabolic Panel:  Lab Results   Component Value Date     03/09/2018      Lab Results   Component Value Date    POTASSIUM 3.6 03/09/2018     Lab Results   Component Value Date    CHLORIDE 107 03/09/2018     Lab Results   Component Value Date    JANICE 7.6 03/09/2018     Lab Results   Component Value Date    CO2 27 03/09/2018     Lab Results   Component Value Date    BUN 11 03/09/2018     Lab Results   Component Value Date    CR 0.63 03/09/2018     Lab Results   Component Value Date     03/09/2018       ASSESSMENT/PLAN: POD 8 left colectomy with transverse  colostomy for obstructing cancer. 3/6 chest CT with bilateral effusions with bibasilar infiltrates suggestive of pneumonia. Abd XR 3/6 with air-filled loops of small bowel and colon, possibly mild ileus. Afebrile, VSS.  1250 cc out NGT, minimal gas or stool from stoma.  RRC replaced by Dr. Lan today.      1. Repeat CT a/p today  2. Continue NPO, NGT, TPN  3. Continue IV Tylenol, PCA dilaudid and PRN pain control  4. Stoma education  5. Encourage OOB, ambulate  6. Lovenox for prophylaxis  7. Antibiotics for PNA per hospitalist    Samantha Lozano PA-C  [facility]  Phone: [phone]    I performed a history and physical examination of the patient and discussed their management with the physician assistant. I reviewed the physician assistants note and agree with the documented findings and plan of care.       CT reviewed.  Ileus resolving.  Cont NG due to bilious output.    Jorge Lan MD  Colorectal Surgery  [phone] (office)  [phone] (pager)  [email or web address]

## 2018-03-09 NOTE — PROGRESS NOTES
[facility]    Hospitalist Progress Note  Name: Gab Holloway    MRN: [id number]  Provider:  Neftaly Pineda DO, MPH  Date of Service: 03/09/2018    Summary of Stay: Gab Holloway is a 57 year old healthy male who was admitted on 2/26/2018 with abdominal pain/bloating. Found to have colonic mass with partial/early obstruction suspicious for malignancy. He had flex sig on 2/28 which showed partially obstructing mass- prelim pathology showed adenocarcinoma. He is s/p open colectomy w/ colostomy on 3/1/2018 per CRS-awaiting return of bowel function. Hem/onc consulted for planning of future treatment and discussion about prognosis.       Early 3/6 he developed increased productive cough and increased work of breathing.  Chest x-ray showed low lung volumes, and subsequent CT scan was negative for pulmonary embolism but revealed bilateral effusions with bibasilar infiltrates suggestive of pneumonia.  He has been started on IV Zosyn pending blood and sputum cultures.  He did have NG tube replaced as well with large output which also seemed to make him feel better.  He now has been weaned to room air.     He continues to have an ileus, and has been started on TPN as per colorectal surgery.    Problem List:   Colonic obstruction 2/2 partially obstructing adenocarcinoma of colon s/p partial colectomy w/ colostomy on 3/1/2018. Pt presented with abd pain and e/o colonic obstruction 2/2 colonic mass.Sigmoidoscopy on 2/28, malignant obstructing mass per path.  Suspect ileus at this point as well.  --appreciate CRS consult, s/p colectomy on 3/1 without any immediate complication  --Consulted hem/onc  --On dilaudid PCA, management deferred to Surgery  --NG per Surgery  --PICC Placed, TPN started 3/7.     Acute hypoxemic respiratory failure: This primarily manifested itself on 3/6.  He has had productive cough of green sputum, tachypnea, and hypoxia.  CT chest was negative for PE but does show bilateral infiltrates  concerning for pneumonia.  He was started on IV Zosyn pending blood and sputum cultures.  Sputum culture growing staph aureus of questionable significant.  He remains afebrile and on RA.  --stop zosyn  --add procalcitonin  --monitor respiratory status and potential fevers     Positive peritoneal wash culture: growing gram +ve rods, suspect contaminant however I am not sure about its significance.  --defer to CRS, current plan appears to be no abx for this indication.     New dx of Adenocarcinoma of colon: oncology on board, will need close outpatient follow up.     Asymptomatic cholelithiasis.     Back rash:  Maculopapular.  Not pruritic.  Possible drug rash.  --stop zosyn as above and monitor rash    # Pain Assessment:   Current Pain Score 3/9/2018 3/9/2018 3/9/2018   Patient currently in pain? - denies denies   Pain score (0-10) 2 - -   Pain location - - -   Pain descriptors - - -   - Gab is experiencing pain due to surgery. Pain management was discussed and the plan was created in a collaborative fashion.  Gab's response to the current recommendations: engaged  - PCA    DVT Prophylaxis: Enoxaparin (Lovenox) SQ  Code Status: Full Code  Disposition: Expected discharge in 2+ days to home. Goals prior to discharge include surgical recovery.   Incidental Findings: As above  Family updated today: No     Interval History   Assumed care from previous hospitalist. The history was fully reviewed.  The patient reports doing well. No chest pain or shortness of breath. No nausea, vomiting, diarrhea, constipation. No fevers. Has rash on back, not itching. No other specific complaints identified.     -Data reviewed today: I reviewed all new labs and imaging results over the last 24 hours.    Physical Exam   Temp: 97.7  F (36.5  C) Temp src: Oral BP: 128/77 Pulse: 81 Heart Rate: 83 Resp: 18 SpO2: 94 % O2 Device: None (Room air)    Vitals:    02/27/18 0208 03/08/18 0500 03/09/18 0615   Weight: 91.2 kg (201 lb 1.6 oz) 97.2  kg (214 lb 3.2 oz) 91.2 kg (201 lb)     Vital Signs with Ranges  Temp:  [97.3  F (36.3  C)-97.9  F (36.6  C)] 97.7  F (36.5  C)  Pulse:  [81-83] 81  Heart Rate:  [83-91] 83  Resp:  [18-20] 18  BP: (119-131)/(70-79) 128/77  SpO2:  [94 %-95 %] 94 %  I/O last 3 completed shifts:  In: 2665 [I.V.:1115]  Out: 3275 [Urine:1700; Emesis/NG output:1250; Stool:325]    GENERAL: No apparent distress. Awake, alert, and fully oriented.  HEENT: Normocephalic, atraumatic. Extraocular movements intact.  CARDIOVASCULAR: Regular rate and rhythm without murmurs or rubs. No S3.  PULMONARY: Clear bilaterally.  GASTROINTESTINAL: Soft, non-tender, non-distended. Bowel sounds normoactive.   EXTREMITIES: No cyanosis or clubbing. No edema.  NEUROLOGICAL: CN 2-12 grossly intact, no focal neurological deficits.  DERMATOLOGICAL: No ulcer, bruising, nor jaundice. Diffuse maculopapular rash on back.    Medications     parenteral nutrition - Clinimix E 85 mL/hr at 03/09/18 0841     lactated ringers 10 mL/hr at 03/09/18 0840     IV fluid REPLACEMENT ONLY       HYDROmorphone       - MEDICATION INSTRUCTIONS -         pantoprazole (PROTONIX) IV  40 mg Intravenous Daily with breakfast     acetaminophen  1,000 mg Intravenous Q8H     insulin aspart  1-12 Units Subcutaneous Q4H     lipids  250 mL Intravenous Q24H     sodium chloride (PF)  10 mL Intracatheter Q7 Days     enoxaparin  40 mg Subcutaneous Q24H     Data     Laboratory:    Recent Labs  Lab 03/08/18  0545 03/07/18  1020 03/06/18  0923 03/06/18  0718  03/04/18  0738   WBC  --  7.9 6.5  --   --  9.4   HGB  --  11.2* 13.5 14.0  < > 10.8*   HCT  --  32.3* 38.6*  --   --  33.5*   MCV  --  97 94  --   --  101*    204 193  --   < > 127*   < > = values in this interval not displayed.    Recent Labs  Lab 03/09/18  0650 03/08/18  2210 03/08/18  0545 03/07/18  1020     --  140 141   POTASSIUM 3.6 3.2* 3.2* 3.3*   CHLORIDE 107  --  105 106   CO2 27  --  29 29   ANIONGAP 5  --  6 6   *  --   97 97   BUN 11  --  12 15   CR 0.63*  --  0.68 0.66   GFRESTIMATED >90  --  >90 >90   GFRESTBLACK >90  --  >90 >90   JANICE 7.6*  --  7.6* 7.7*       Recent Labs  Lab 03/06/18  2230 03/06/18  1914 03/06/18  1356 03/06/18  1040   CULT Moderate growthNormal sarah  Moderate growthStaphylococcus aureusSusceptibility testing in progress* No growth after 3 days No growth after 3 days Canceled, Test credited>10 Squamous epithelial cells/low power field indicates oral contamination. Please recollect.*  Notification of test cancellation was given Zulay Quinones RN [id number] @ 1346.cg 03/06/18       Imaging:  No results found for this or any previous visit (from the past 24 hour(s)).      Neftaly Pineda DO MPH  [facility] Hospitalist  [address] [place] [address]  Pager: [phone]  03/09/2018

## 2018-03-09 NOTE — PLAN OF CARE
Problem: Patient Care Overview  Goal: Plan of Care/Patient Progress Review  Outcome: Improving  VSS, no temp   Patient denies pain all night. Slept throughout the night.  Q4 blood sugars, TPN at 85ml/hr with lipids infusing.  K+ replaced x1   NGT hoscbu=321ll  Ostomy red/edematous, 50cc of brown watery stool  abd incision healing well, no s/s of infection  DL PICC line infusing and has excellent blood return

## 2018-03-09 NOTE — PLAN OF CARE
Problem: Patient Care Overview  Goal: Plan of Care/Patient Progress Review  Outcome: Improving    Ambulatory Status:  Pt up SBA, steady gait.  Orientation: alert and oriented x4  VS:  VSS  Pain:  Denies pain this shift  Resp: LS fine crackles. Infrequent, productive cough  GI:  Denies nausea. NPO, ice chips. . Hypoactive BS.  Passing flatus.  Ostomy output 125 mL, watery-brown. Stoma very edematous. NG output 300 mL, greenish.   :  WDL  Skin:  WDL    Tx:  IV Zosyn, TPN, NG, NPO   Labs:  Potassium 3.2, replaced per protocol. Re-check 3.2, will replace next shift.   Consults:  WOC, colorectal   Disposition:  TBD

## 2018-03-09 NOTE — PROGRESS NOTES
Oncology/Hematology Follow Up Note:    Assessment and Plan:  #1 Obstructive left-sided colon cancer, stage II, s/p left hemicolectomy      It was my pleasure to see the patient in the hospital today.  He underwent left hemicolectomy for his newly diagnosed obstructive left-sided colon cancer last Thursday.  Pathology report showed a grade 2 T3 tumor.  All resected lymph nodes were negative.  Surgical margins were negative.  MMR proficient      PLAN:  1. Explained to the patient that he has stage II colon cancer.  The obstructive nature of the tumor is a negative prognostic factor, but it is moderately differentiated and T3 instead of T4.  There was no perineural or lymphovascular invasion.  Therefore, I think the risk of recurrence is too low to justify adjuvant chemotherapy  2. Will arrange follow-up as an outpatient in 3 months for the 1st surveillance visit.      #2 Health care associated pneumonia  - Clinically improved  - Managed by primary team      #3 Post-op ileus  - Currently has NGT in place. Still has hypoactive bowel sounds.  - On TPN  - Continue to follow colorectal surgery and nutrition recommendations.      Raj Luke M.D.  [facility]  [phone]      Subjective:    Shortness of breath continues to improve.  Productive cough has resolved.  The patient continues to have about 500-600 cc of NG output daily.  Minimal colostomy output.  Tolerating TPN.    Scheduled Medications:  Reviewed active medications    Labs:  CBC RESULTS:   Recent Labs   Lab Test  03/08/18   0545  03/07/18   1020  03/06/18   0923  03/06/18   0718   03/04/18   0738   WBC   --   7.9  6.5   --    --   9.4   HGB   --   11.2*  13.5  14.0   < >  10.8*   HCT   --   32.3*  38.6*   --    --   33.5*   MCV   --   97  94   --    --   101*   PLT  215  204  193   --    < >  127*    < > = values in this interval not displayed.       CMP  Recent Labs  Lab 03/09/18  0650 03/08/18  2210 03/08/18  0545 03/07/18  1020 03/06/18  1357  "03/06/18  0923     --  140 141  --  138   POTASSIUM 3.6 3.2* 3.2* 3.3* 3.3* 3.3*   CHLORIDE 107  --  105 106  --  103   CO2 27  --  29 29  --  28   ANIONGAP 5  --  6 6  --  7   *  --  97 97  --  113*   BUN 11  --  12 15  --  11   CR 0.63*  --  0.68 0.66  --  0.58*   GFRESTIMATED >90  --  >90 >90  --  >90   GFRESTBLACK >90  --  >90 >90  --  >90   JANICE 7.6*  --  7.6* 7.7*  --  8.0*   MAG 2.3  --  1.9 2.2 1.7  --    PHOS 2.6  --  3.0 2.1*  --   --    PROTTOTAL  --   --  5.4* 5.5*  --   --    ALBUMIN  --   --  1.9* 1.9*  --   --    BILITOTAL  --   --  0.8 1.2  --   --    ALKPHOS  --   --  134 127  --   --    AST  --   --  71* 150*  --   --    ALT  --   --  63 87*  --   --        INR  Recent Labs  Lab 03/07/18  1020   INR 1.12       Objective/Physical Exam:  Blood pressure 128/77, pulse 81, temperature 97.7  F (36.5  C), temperature source Oral, resp. rate 18, height 1.778 m (5' 10\"), weight 91.2 kg (201 lb), SpO2 94 %.  General appearance: Awake, alert, and oriented  HEENT:NGT in place.  Lungs: chest is clear to auscultation  Abdomen: Hypoactive bowel sounds.  Minimal dark brown liquid output in colostomy bag.    Raj Luke MD  [facility]  3/9/2018 10:13 AM        "

## 2018-03-09 NOTE — PROGRESS NOTES
"[facility] Nurse Inpatient Ostomy Assessment      Assessment of ostomy and needs for:   New Colostomy      Data:   History:      Per MD note(s): Summary of Stay: Gab Holloway is a 57 year old healthy male who was admitted on 2/26/2018 with abdominal pain/bloating. Found to have colonic mass with partial/early obstruction suspicious for malignancy. He had flex sig on 2/28 which showed partially obstructing mass- prelim pathology showed adenocarcinoma. He is s/p open colectomy w/ colostomy on 3/1/2018 per CRS-awaiting return of bowel function. Hem/onc consulted for planning of future treatment and discussion about prognosis.     Type of ostomy:  Colostomy   Stoma assessment:   ? Size of stoma:  2\",  beefy red, moist, edematous, firm, protruberant, RRC in Os  Mucocutaneous Junction (MCJ):   Intact with sutures  Peristomal skin:  intact  Abdominal assessment: firm, distended, intact staple line  ? N/G still in place?:  Yes  Output:  small, liquid and bloody with loose clots on stoma      Intake/Output Summary (Last 24 hours) at 03/09/18 1242  Last data filed at 03/09/18 1023   Gross per 24 hour   Intake             2665 ml   Output             3300 ml   Net             -635 ml         Active Diet Order      Diet      NPO for Medical/Clinical Reasons Except for: No Exceptions, Ice Chips  Pt on TPN    Labs:   Recent Labs   Lab Test  03/08/18   0545  03/07/18   1020   ALBUMIN  1.9*  1.9*   HGB   --   11.2*   INR   --   1.12   WBC   --   7.9         Intervention:     Patient's chart evaluated.      Assessments done today:  Stoma, pouch system changed, readiness to learn, supply needs reviewed    Education: demonstration on pouch change with pt and then pouch changed, discussion on skin care, product options from 1-2 pc and closure systems    Prepared for discharge by: supplies reviewed    Pt registered for ostomy supply samples? On Discharge         Assessment:     Stoma assessment: healthy, beefy " red, edematous, no active bleeding noted but large clot easily slid off of stoma; Pouch changed to 2 pc for easy viewing and placement of RRC to Os.     Learning needs/ comprehension: pt observed pouch change, asked questions and was very engaged with education today.       Effectiveness of current pouching/ supply plan: TBD         Plan:     Plan:   ? Current pouching system: Coloplast 2 piece convex pouch.  Additional supplies in room.   ?  Continue to prepare for d/c - will finalize supplies and d/c instructions prior to discharge  o Do [facility] Nurse recommend home care:  Yes, spouse was present for extensive education prior to surgery but suspect pt will need support when discharged.

## 2018-03-10 LAB
GLUCOSE BLDC GLUCOMTR-MCNC: 106 MG/DL (ref 70–99)
GLUCOSE BLDC GLUCOMTR-MCNC: 110 MG/DL (ref 70–99)
GLUCOSE BLDC GLUCOMTR-MCNC: 118 MG/DL (ref 70–99)
GLUCOSE BLDC GLUCOMTR-MCNC: 120 MG/DL (ref 70–99)
GLUCOSE BLDC GLUCOMTR-MCNC: 129 MG/DL (ref 70–99)
GLUCOSE BLDC GLUCOMTR-MCNC: 130 MG/DL (ref 70–99)
MAGNESIUM SERPL-MCNC: 2.1 MG/DL (ref 1.6–2.3)
PHOSPHATE SERPL-MCNC: 2.8 MG/DL (ref 2.5–4.5)
POTASSIUM SERPL-SCNC: 3.7 MMOL/L (ref 3.4–5.3)

## 2018-03-10 PROCEDURE — 99232 SBSQ HOSP IP/OBS MODERATE 35: CPT | Performed by: HOSPITALIST

## 2018-03-10 PROCEDURE — 84100 ASSAY OF PHOSPHORUS: CPT | Performed by: HOSPITALIST

## 2018-03-10 PROCEDURE — 25000125 ZZHC RX 250: Performed by: INTERNAL MEDICINE

## 2018-03-10 PROCEDURE — 84132 ASSAY OF SERUM POTASSIUM: CPT | Performed by: HOSPITALIST

## 2018-03-10 PROCEDURE — 25000125 ZZHC RX 250: Performed by: COLON & RECTAL SURGERY

## 2018-03-10 PROCEDURE — 25000128 H RX IP 250 OP 636: Performed by: PHYSICIAN ASSISTANT

## 2018-03-10 PROCEDURE — 25000125 ZZHC RX 250: Performed by: HOSPITALIST

## 2018-03-10 PROCEDURE — 25000128 H RX IP 250 OP 636: Performed by: COLON & RECTAL SURGERY

## 2018-03-10 PROCEDURE — 83735 ASSAY OF MAGNESIUM: CPT | Performed by: HOSPITALIST

## 2018-03-10 PROCEDURE — 99207 ZZC CDG-MDM COMPONENT: MEETS LOW - DOWN CODED: CPT | Performed by: HOSPITALIST

## 2018-03-10 PROCEDURE — 12000000 ZZH R&B MED SURG/OB

## 2018-03-10 PROCEDURE — 00000146 ZZHCL STATISTIC GLUCOSE BY METER IP

## 2018-03-10 PROCEDURE — 25000128 H RX IP 250 OP 636: Performed by: HOSPITALIST

## 2018-03-10 RX ADMIN — PANTOPRAZOLE SODIUM 40 MG: 40 INJECTION, POWDER, FOR SOLUTION INTRAVENOUS at 09:14

## 2018-03-10 RX ADMIN — I.V. FAT EMULSION 250 ML: 20 EMULSION INTRAVENOUS at 19:39

## 2018-03-10 RX ADMIN — SODIUM CHLORIDE, POTASSIUM CHLORIDE, SODIUM LACTATE AND CALCIUM CHLORIDE: 600; 310; 30; 20 INJECTION, SOLUTION INTRAVENOUS at 08:10

## 2018-03-10 RX ADMIN — LORAZEPAM 0.5 MG: 2 INJECTION INTRAMUSCULAR; INTRAVENOUS at 00:21

## 2018-03-10 RX ADMIN — ASCORBIC ACID, VITAMIN A PALMITATE, CHOLECALCIFEROL, THIAMINE HYDROCHLORIDE, RIBOFLAVIN-5 PHOSPHATE SODIUM, PYRIDOXINE HYDROCHLORIDE, NIACINAMIDE, DEXPANTHENOL, ALPHA-TOCOPHEROL ACETATE, VITAMIN K1, FOLIC ACID, BIOTIN, CYANOCOBALAMIN: 200; 3300; 200; 6; 3.6; 6; 40; 15; 10; 150; 600; 60; 5 INJECTION, SOLUTION INTRAVENOUS at 09:31

## 2018-03-10 RX ADMIN — ENOXAPARIN SODIUM 40 MG: 40 INJECTION SUBCUTANEOUS at 13:08

## 2018-03-10 ASSESSMENT — ACTIVITIES OF DAILY LIVING (ADL)
ADLS_ACUITY_SCORE: 11

## 2018-03-10 NOTE — PLAN OF CARE
Problem: Patient Care Overview  Goal: Plan of Care/Patient Progress Review  Patient remains hospitalized following colon mass obstruction, currently POD #8. Pt did not complain of pain but does have PCA dilaudid. Did not use this shift. Bowel sounds hypoactive. Passing gas. Having BM through ostomy. Currently on NPO diet, with ice chips. Incision open to air. Voiding via urinal. Ambulating with stand by assist. NG in place. Room air. VSS.

## 2018-03-10 NOTE — PROGRESS NOTES
COLON & RECTAL SURGERY  PROGRESS NOTE    March 10, 2018  Post-op Day # 9 left colectomy and colostomy for cancer    SUBJECTIVE:  NG with >1.5L output yesterday.  CT showing ileus and expected abdominal fluid from surgery.  Patient with minimal pain.  Trying to walk more. No n/v.  No stoma output    OBJECTIVE:  Temp:  [96.5  F (35.8  C)-100  F (37.8  C)] 96.9  F (36.1  C)  Heart Rate:  [84-91] 84  Resp:  [16-18] 18  BP: (125-136)/(76-83) 130/77  SpO2:  [94 %-97 %] 94 %    Intake/Output Summary (Last 24 hours) at 03/10/18 1150  Last data filed at 03/10/18 0807   Gross per 24 hour   Intake           2601.8 ml   Output             3350 ml   Net           -748.2 ml       GENERAL:  Awake, alert, no acute distress  ABDOMEN:  Soft, appropriately tender, mod distended  INCISION:  C/d/i, stoma without any air in bag, scant green fluid output    ASSESSMENT/PLAN: POD#9 left colectomy and colostomy for cancer  1. Continue NG and NPO.  CT yesterday confirmed post op ileus.  Await full bowel function with significant air in stoma bag.  2. Encourage ambulation  3. Continue IV tylenol for pain control.  Minimize narcotics as able  4. Afebrile and normal WBC so no need for Abx.  Fluid in abd on CT is expected post op.  5. Continue TPN  6. lovenox for prophylaxis  7. Appreciate hospitalist help with medical co-morbidities     Joann Mayorga MD  [facility]  Pager:  [phone]  Phone: [phone]  Fax: [phone]

## 2018-03-10 NOTE — PROGRESS NOTES
[facility]    Hospitalist Progress Note  Name: Gab Holloway    MRN: [id number]  Provider:  Neftaly Pineda DO MPH  Date of Service: 03/10/2018    Summary of Stay: Gab Holloway is a 57 year old healthy male who was admitted on 2/26/2018 with abdominal pain/bloating. Found to have colonic mass with partial/early obstruction suspicious for malignancy. He had flex sig on 2/28 which showed partially obstructing mass- prelim pathology showed adenocarcinoma. He is s/p open colectomy w/ colostomy on 3/1/2018 per CRS-awaiting return of bowel function. Hem/onc consulted for planning of future treatment and discussion about prognosis.       Early 3/6 he developed increased productive cough and increased work of breathing.  Chest x-ray showed low lung volumes, and subsequent CT scan was negative for pulmonary embolism but revealed bilateral effusions with bibasilar infiltrates suggestive of pneumonia.  He has been started on IV Zosyn pending blood and sputum cultures.  He did have NG tube replaced as well with large output which also seemed to make him feel better.  He now has been weaned to room air.     He continues to have an ileus, and has been started on TPN as per colorectal surgery.    Problem List:   Colonic obstruction 2/2 partially obstructing adenocarcinoma of colon s/p partial colectomy w/ colostomy on 3/1/2018. Pt presented with abd pain and e/o colonic obstruction 2/2 colonic mass.Sigmoidoscopy on 2/28, malignant obstructing mass per path.  Suspect ileus at this point as well.  --appreciate CRS consult, s/p colectomy on 3/1 without any immediate complication.  --Consulted Heme/Onc.  --On dilaudid PCA, management deferred to Surgery.  --NGT per Surgery.  --PICC Placed, TPN started 3/7.     Acute hypoxemic respiratory failure: This primarily manifested itself on 3/6.  He has had productive cough of green sputum, tachypnea, and hypoxia.  CT chest was negative for PE but does show bilateral  infiltrates concerning for pneumonia.  He was started on IV Zosyn pending blood and sputum cultures.  Sputum culture growing staph aureus of questionable significant.  He remains afebrile and on RA.  CT shows atelectasis which likely explains Tm 100 overnight but monitor closely.  --watch off zosyn  --procalcitonin moderate  --monitor respiratory status and potential fevers     Positive peritoneal wash culture: growing gram +ve rods, suspect contaminant however I am not sure about its significance.  --defer to CRS, current plan appears to be no abx for this indication.     New dx of Adenocarcinoma of colon: Oncology on board, will need close outpatient follow up.     Asymptomatic cholelithiasis.     Back rash:  Maculopapular.  Not pruritic.  Possible drug rash.  Appears improved.  --stopped zosyn as above and monitor rash  --antihistamines PRN     # Pain Assessment:   Current Pain Score 3/10/2018 3/10/2018 3/9/2018   Patient currently in pain? - denies denies   Pain score (0-10) 0 - -   Pain location - - -   Pain descriptors - - -   - Gab is experiencing pain due to surgery. Pain management was discussed and the plan was created in a collaborative fashion.  Gab's response to the current recommendations: engaged  - PCA    DVT Prophylaxis: Enoxaparin (Lovenox) SQ  Code Status: Full Code  Disposition: Expected discharge in 2+ days to home. Goals prior to discharge include surgical recovery.   Incidental Findings: As above  Family updated today: No     Interval History   The patient reports doing well. No chest pain or shortness of breath. No nausea, vomiting, diarrhea, constipation. Tm 100. Has rash on back, not itching. No other specific complaints identified.     -Data reviewed today: I reviewed all new labs and imaging results over the last 24 hours.    Physical Exam   Temp: 96.9  F (36.1  C) Temp src: Axillary BP: 130/77   Heart Rate: 84 Resp: 18 SpO2: 94 % O2 Device: None (Room air)    Vitals:    02/27/18  0208 03/08/18 0500 03/09/18 0615   Weight: 91.2 kg (201 lb 1.6 oz) 97.2 kg (214 lb 3.2 oz) 91.2 kg (201 lb)     Vital Signs with Ranges  Temp:  [96.5  F (35.8  C)-100  F (37.8  C)] 96.9  F (36.1  C)  Heart Rate:  [84-91] 84  Resp:  [16-18] 18  BP: (125-136)/(76-83) 130/77  SpO2:  [94 %-97 %] 94 %  I/O last 3 completed shifts:  In: 3201.8 [P.O.:600; I.V.:341.8]  Out: 3800 [Urine:2275; Emesis/NG output:1525]    GENERAL: No apparent distress. Awake, alert, and fully oriented.  HEENT: Normocephalic, atraumatic. Extraocular movements intact.  CARDIOVASCULAR: Regular rate and rhythm without murmurs or rubs. No S3.  PULMONARY: Clear bilaterally.  GASTROINTESTINAL: Soft, non-tender, non-distended. Bowel sounds normoactive.   EXTREMITIES: No cyanosis or clubbing. No edema.  NEUROLOGICAL: CN 2-12 grossly intact, no focal neurological deficits.  DERMATOLOGICAL: No ulcer, bruising, nor jaundice. Diffuse maculopapular rash on back.    Medications     parenteral nutrition - Clinimix E 85 mL/hr at 03/10/18 0931     lactated ringers 10 mL/hr at 03/10/18 0810     IV fluid REPLACEMENT ONLY       HYDROmorphone       - MEDICATION INSTRUCTIONS -         pantoprazole (PROTONIX) IV  40 mg Intravenous Daily with breakfast     acetaminophen  1,000 mg Intravenous Q8H     insulin aspart  1-12 Units Subcutaneous Q4H     lipids  250 mL Intravenous Q24H     sodium chloride (PF)  10 mL Intracatheter Q7 Days     enoxaparin  40 mg Subcutaneous Q24H     Data     Laboratory:    Recent Labs  Lab 03/08/18  0545 03/07/18  1020 03/06/18  0923 03/06/18  0718  03/04/18  0738   WBC  --  7.9 6.5  --   --  9.4   HGB  --  11.2* 13.5 14.0  < > 10.8*   HCT  --  32.3* 38.6*  --   --  33.5*   MCV  --  97 94  --   --  101*    204 193  --   < > 127*   < > = values in this interval not displayed.    Recent Labs  Lab 03/10/18  0630 03/09/18  0650 03/08/18  2210 03/08/18  0545 03/07/18  1020   NA  --  139  --  140 141   POTASSIUM 3.7 3.6 3.2* 3.2* 3.3*    CHLORIDE  --  107  --  105 106   CO2  --  27  --  29 29   ANIONGAP  --  5  --  6 6   GLC  --  120*  --  97 97   BUN  --  11  --  12 15   CR  --  0.63*  --  0.68 0.66   GFRESTIMATED  --  >90  --  >90 >90   GFRESTBLACK  --  >90  --  >90 >90   JANICE  --  7.6*  --  7.6* 7.7*       Recent Labs  Lab 03/06/18  2230 03/06/18  1914 03/06/18  1356 03/06/18  1040   CULT Moderate growthNormal sarah  Moderate growthStaphylococcus aureus* No growth after 4 days No growth after 4 days Canceled, Test credited>10 Squamous epithelial cells/low power field indicates oral contamination. Please recollect.*  Notification of test cancellation was given Zulay Quinones RN [email or web address] @ 1346.cg 03/06/18       Imaging:  Recent Results (from the past 24 hour(s))   CT Abdomen Pelvis w/o & w Contrast    Narrative    CT ABDOMEN AND PELVIS WITHOUT AND WITH CONTRAST   3/9/2018 3:33 PM     HISTORY: Post abdominal surgery, question ileus vs bowel obstruction.     TECHNIQUE:  CT abdomen and pelvis with 100 mL Isovue-370 IV. Radiation  dose for this scan was reduced using automated exposure control,  adjustment of the mA and/or kV according to patient size, or iterative  reconstruction technique.    COMPARISON: CT chest, abdomen, and pelvis 2/27/2018. CT chest  3/6/2018.    FINDINGS: Again seen are bibasilar moderate pleural effusions,  slightly larger in volume. Bilateral lower lobe prominent atelectasis  an/or airspace consolidation again identified.    There is a nasogastric tube in place within the gastric lumen. There  is a right abdominal ostomy. There are several moderately dilated  proximal to mid small bowel loops identified. There is no abrupt  transition point to decompressed distal small bowel leading to the  right colon. There is wall thickening of the remaining proximal colon  leading to the ostomy site. There is some small pericolic gutter fluid  with mild peripheral enhancement, for example at the left flank series  2 image 33.  Scattered areas of mild edema of the fat in the abdomen  noted.    Cholelithiasis. Liver, adrenals, spleen, pancreas, and kidneys show no  acute abnormalities. No acute aortic abnormality.      Impression    IMPRESSION:  1. Several moderately dilated small bowel loops identified. There is  no abrupt transition to decompression identified. Rather, there is  some gentle decompression at the distal small bowel. As such, this  could represent ileus.  2. Bilateral pericolic gutter small layering fluid with some mild  peripheral enhancement. Correlate with any evidence of peritonitis.  3. Moderate bibasilar pleural effusions appears slightly larger.  Bilateral lower lobe prominent atelectasis and/or airspace disease  again noted.  4. Cholelithiasis.    MD Neftaly IVEY DO MPH  [facility]  [address] [place] [address]  Pager: [phone]  03/10/2018

## 2018-03-10 NOTE — PLAN OF CARE
Problem: Patient Care Overview  Goal: Plan of Care/Patient Progress Review  Outcome: Improving  Patient POD 9 for abdominal surgery  Orientation: A/O x4, calls as needed  VS stable, afebrile, denies pain    LS: clear and equal  GI: + bowel sounds, +brown watery stools and gas in ostomy, NG to LIS  : Adequate urine output, voiding into urinal   Diet: NPO except ice chips  PICC: double lumen, TPN infusing  Activity: Up with SBA, ambulated the halls this shift   Labs:  K+, Mg, Phos all WNL, patient is on protocols  Disposition: Expected discharge TBD

## 2018-03-10 NOTE — PLAN OF CARE
Problem: Patient Care Overview  Goal: Plan of Care/Patient Progress Review  Outcome: No Change  Patient slept through the shift with limited disturbances. NGT to low cont suction. TPN infusing at 85ml/hr. Dose of IV Ativan given for anxiety and to facilitate sleep. Spouse sleeping in room. Denies pain and has not used PCA and refused tylenol IV this shift. No resp distress noted and did not require O2.

## 2018-03-10 NOTE — PROGRESS NOTES
Onc chart check.  Patient to f/u with Dr. Luke in 3 months for stage II colon cancer surveillance.

## 2018-03-11 LAB
GLUCOSE BLDC GLUCOMTR-MCNC: 119 MG/DL (ref 70–99)
GLUCOSE BLDC GLUCOMTR-MCNC: 119 MG/DL (ref 70–99)
GLUCOSE BLDC GLUCOMTR-MCNC: 133 MG/DL (ref 70–99)
GLUCOSE BLDC GLUCOMTR-MCNC: 136 MG/DL (ref 70–99)
GLUCOSE BLDC GLUCOMTR-MCNC: 137 MG/DL (ref 70–99)
GLUCOSE BLDC GLUCOMTR-MCNC: 137 MG/DL (ref 70–99)
PLATELET # BLD AUTO: 324 10E9/L (ref 150–450)

## 2018-03-11 PROCEDURE — 25000125 ZZHC RX 250: Performed by: INTERNAL MEDICINE

## 2018-03-11 PROCEDURE — 00000146 ZZHCL STATISTIC GLUCOSE BY METER IP

## 2018-03-11 PROCEDURE — 99207 ZZC CDG-MDM COMPONENT: MEETS LOW - DOWN CODED: CPT | Performed by: HOSPITALIST

## 2018-03-11 PROCEDURE — 25000125 ZZHC RX 250: Performed by: COLON & RECTAL SURGERY

## 2018-03-11 PROCEDURE — 12000000 ZZH R&B MED SURG/OB

## 2018-03-11 PROCEDURE — 99232 SBSQ HOSP IP/OBS MODERATE 35: CPT | Performed by: HOSPITALIST

## 2018-03-11 PROCEDURE — 25000128 H RX IP 250 OP 636: Performed by: COLON & RECTAL SURGERY

## 2018-03-11 PROCEDURE — 85049 AUTOMATED PLATELET COUNT: CPT | Performed by: HOSPITALIST

## 2018-03-11 RX ORDER — ACETAMINOPHEN 10 MG/ML
1000 INJECTION, SOLUTION INTRAVENOUS EVERY 6 HOURS PRN
Status: DISCONTINUED | OUTPATIENT
Start: 2018-03-11 | End: 2018-03-13

## 2018-03-11 RX ADMIN — PANTOPRAZOLE SODIUM 40 MG: 40 INJECTION, POWDER, FOR SOLUTION INTRAVENOUS at 08:08

## 2018-03-11 RX ADMIN — ENOXAPARIN SODIUM 40 MG: 40 INJECTION SUBCUTANEOUS at 13:14

## 2018-03-11 RX ADMIN — I.V. FAT EMULSION 250 ML: 20 EMULSION INTRAVENOUS at 20:11

## 2018-03-11 ASSESSMENT — ACTIVITIES OF DAILY LIVING (ADL)
ADLS_ACUITY_SCORE: 11

## 2018-03-11 NOTE — PROGRESS NOTES
COLON & RECTAL SURGERY  PROGRESS NOTE    March 11, 2018  Post-op Day # 10    SUBJECTIVE:  Doing well.  Stoma appliance filled with air last night.  Scant liquid output.  No n/v.  NG output decreased    OBJECTIVE:  Temp:  [95.9  F (35.5  C)-99.5  F (37.5  C)] 95.9  F (35.5  C)  Pulse:  [84] 84  Heart Rate:  [82-86] 82  Resp:  [16-18] 16  BP: (121-124)/(72-76) 124/76  SpO2:  [95 %-97 %] 96 %    Intake/Output Summary (Last 24 hours) at 03/11/18 1505  Last data filed at 03/11/18 1423   Gross per 24 hour   Intake             2322 ml   Output             3900 ml   Net            -1578 ml       GENERAL:  Awake, alert, no acute distress  ABDOMEN:  Soft, appropriately tender, non-distended  INCISION:  C/d/i, stoma pink, RRC in place    ASSESSMENT/PLAN: POD#10  1. NG removed  2. Ice chips only for now.  If starts to pass more flatus or stool into bag then can advance to clear liquids  3. Encourage ambulation  4. Avoid narcotics  5. Continue TPN  6. lovenox  7. Appreciate hospitalist help    Joann Mayorga MD  [facility]  Pager:  [phone]  Phone: [phone]  Fax: [phone]

## 2018-03-11 NOTE — PLAN OF CARE
Problem: Patient Care Overview  Goal: Plan of Care/Patient Progress Review  Outcome: No Change  Pt A&O, up in halls independently, Tier A activity level. VSS, afebrile + sats maintained on RA. Denies pain, refused scheduled Tylenol, no delivered doses of PCA dilaudid. BS hypoactive, +flatus, ostomy pouch burped x1. Abd incision with staples, CDI. NG to LIS, 400cc output, -n/v. NPO, TPN infusing,  + 118, no SSI required. Adequate UOP.

## 2018-03-11 NOTE — PROGRESS NOTES
[facility]    Hospitalist Progress Note  Name: Gab Holloway    MRN: [id number]  Provider:  Neftaly Pineda DO MPH  Date of Service: 03/11/2018    Summary of Stay: Gab Holloway is a 57 year old healthy male who was admitted on 2/26/2018 with abdominal pain/bloating. Found to have colonic mass with partial/early obstruction suspicious for malignancy. He had flex sig on 2/28 which showed partially obstructing mass- prelim pathology showed adenocarcinoma. He is s/p open colectomy w/ colostomy on 3/1/2018 per CRS-awaiting return of bowel function. Hem/onc consulted for planning of future treatment and discussion about prognosis.       Early 3/6 he developed increased productive cough and increased work of breathing.  Chest x-ray showed low lung volumes, and subsequent CT scan was negative for pulmonary embolism but revealed bilateral effusions with bibasilar infiltrates suggestive of pneumonia.  He has been started on IV Zosyn pending blood and sputum cultures.  He did have NG tube replaced as well with large output which also seemed to make him feel better.  He now has been weaned to room air.     He continues to have an ileus, and has been started on TPN as per colorectal surgery.  He is slowly recovering but overall showing gradual improvement.     Problem List:   Colonic obstruction 2/2 partially obstructing adenocarcinoma of colon s/p partial colectomy w/ colostomy on 3/1/2018. Pt presented with abd pain and e/o colonic obstruction 2/2 colonic mass.  Sigmoidoscopy on 2/28, malignant obstructing mass per path.  Suspect ileus at this point as well.  --Appreciate CRS consult.   --Status post colectomy on 3/1 without any immediate complication.  --Consulted Heme/Onc.  --Stop dilaudid PCA as he is not using it, still has IV dilaudid PRN.  --Make APAP IV PRN as he is not using it and has no pain.  --NGT mgmt per Surgery.  --PICC Placed, TPN started 3/7.     Acute hypoxemic respiratory failure: This  primarily manifested itself on 3/6.  He has had productive cough of green sputum, tachypnea, and hypoxia.  CT chest was negative for PE but does show bilateral infiltrates concerning for pneumonia.  He was started on IV Zosyn pending blood and sputum cultures.  Sputum culture growing staph aureus of questionable significant.  He remains essentially afebrile and on RA.  CT shows atelectasis which likely explains Tm 100 overnight but monitor closely.  --watch off zosyn  --procalcitonin moderate  --monitor respiratory status and potential fevers     Positive peritoneal wash culture: growing gram +ve rods, suspect contaminant however I am not sure about its significance.  --Defer to CRS, current plan appears to be no abx for this indication.     New dx of Adenocarcinoma of colon: Oncology on board, will need close outpatient follow up.     Asymptomatic cholelithiasis.     Back rash:  Maculopapular.  Not pruritic.  Possible drug rash.  Appears improved.  --Stopped zosyn as above and monitor rash  --Antihistamines PRN     # Pain Assessment:   Current Pain Score 3/11/2018 3/10/2018 3/10/2018   Patient currently in pain? denies denies denies   Pain score (0-10) - 0 0   Pain location - - -   Pain descriptors - - -   - Gab is experiencing pain due to surgery. Pain management was discussed and the plan was created in a collaborative fashion.  Gab's response to the current recommendations: engaged  - PRN IV dilaudid and APAP    DVT Prophylaxis: Enoxaparin (Lovenox) SQ  Code Status: Full Code  Disposition: Expected discharge in 2+ days to home. Goals prior to discharge include surgical recovery.   Incidental Findings: As above  Family updated today: Yes, spouse at bedside.     Interval History   The patient reports doing well. No chest pain or shortness of breath. No nausea, vomiting, diarrhea, constipation. Tm mid 99. Has rash on back, not itching and he reports improvement. No other specific complaints identified.      -Data reviewed today: I reviewed all new labs and imaging results over the last 24 hours.    Physical Exam   Temp: 95.9  F (35.5  C) Temp src: Oral BP: 124/76 Pulse: 84 Heart Rate: 82 Resp: 16 SpO2: 96 % O2 Device: None (Room air)    Vitals:    03/08/18 0500 03/09/18 0615 03/11/18 0559   Weight: 97.2 kg (214 lb 3.2 oz) 91.2 kg (201 lb) 86.1 kg (189 lb 12.8 oz)     Vital Signs with Ranges  Temp:  [95.9  F (35.5  C)-99.5  F (37.5  C)] 95.9  F (35.5  C)  Pulse:  [84] 84  Heart Rate:  [82-86] 82  Resp:  [16-18] 16  BP: (121-124)/(72-76) 124/76  SpO2:  [95 %-97 %] 96 %  I/O last 3 completed shifts:  In: 2364.7 [I.V.:143.5; IV Piggyback:81]  Out: 4125 [Urine:3325; Emesis/NG output:700; Stool:100]    GENERAL: No apparent distress. Awake, alert, and fully oriented.  HEENT: Normocephalic, atraumatic. Extraocular movements intact.  CARDIOVASCULAR: Regular rate and rhythm without murmurs or rubs. No S3.  PULMONARY: Clear bilaterally.  GASTROINTESTINAL: Soft, non-tender, non-distended. Bowel sounds normoactive.   EXTREMITIES: No cyanosis or clubbing. No edema.  NEUROLOGICAL: CN 2-12 grossly intact, no focal neurological deficits.  DERMATOLOGICAL: No ulcer, bruising, nor jaundice. Diffuse maculopapular rash on back, improved.    Medications     parenteral nutrition - Clinimix E 85 mL/hr at 03/10/18 1710     lactated ringers 10 mL/hr at 03/10/18 0810     IV fluid REPLACEMENT ONLY       - MEDICATION INSTRUCTIONS -         pantoprazole (PROTONIX) IV  40 mg Intravenous Daily with breakfast     insulin aspart  1-12 Units Subcutaneous Q4H     lipids  250 mL Intravenous Q24H     sodium chloride (PF)  10 mL Intracatheter Q7 Days     enoxaparin  40 mg Subcutaneous Q24H     Data     Laboratory:    Recent Labs  Lab 03/11/18  0800 03/08/18  0545 03/07/18  1020 03/06/18  0923 03/06/18  0718   WBC  --   --  7.9 6.5  --    HGB  --   --  11.2* 13.5 14.0   HCT  --   --  32.3* 38.6*  --    MCV  --   --  97 94  --     215 204 193  --         Recent Labs  Lab 03/10/18  0630 03/09/18  0650 03/08/18  2210 03/08/18  0545 03/07/18  1020   NA  --  139  --  140 141   POTASSIUM 3.7 3.6 3.2* 3.2* 3.3*   CHLORIDE  --  107  --  105 106   CO2  --  27  --  29 29   ANIONGAP  --  5  --  6 6   GLC  --  120*  --  97 97   BUN  --  11  --  12 15   CR  --  0.63*  --  0.68 0.66   GFRESTIMATED  --  >90  --  >90 >90   GFRESTBLACK  --  >90  --  >90 >90   JANICE  --  7.6*  --  7.6* 7.7*       Recent Labs  Lab 03/06/18  2230 03/06/18  1914 03/06/18  1356 03/06/18  1040   CULT Moderate growthNormal sarah  Moderate growthStaphylococcus aureus* No growth after 5 days No growth after 5 days Canceled, Test credited>10 Squamous epithelial cells/low power field indicates oral contamination. Please recollect.*  Notification of test cancellation was given Zulay Quinones RN [email or web address] @ 1346.cg 03/06/18       Imaging:  No results found for this or any previous visit (from the past 24 hour(s)).      Neftaly Pineda DO MPH  [facility] Hospitalist  [address] [place] [address]  Pager: [phone]  03/11/2018

## 2018-03-11 NOTE — PLAN OF CARE
Problem: Patient Care Overview  Goal: Plan of Care/Patient Progress Review  Outcome: No Change  Pt A&O, slept much of night. Tmax 99.5, down to 98.3 without intervention, sats maintained on RA. Denies pain, refused scheduled Tylenol, no delivered doses of PCA dilaudid. NPO, NG to LIS with 100cc output, -n/v. TPN infusing,  + 137, no SSI required. BS+, +flatus, -BM. Voiding adequately.

## 2018-03-11 NOTE — PLAN OF CARE
Problem: Patient Care Overview  Goal: Plan of Care/Patient Progress Review  Vss. Denied pain. Pca d/c'd. 300 ml's from ng. Ng pulled, continues on NPO w/ ice. Up ind, walking halls. Midline PRAVIN. Ostomy w/ stool and gas.

## 2018-03-12 LAB
ALBUMIN SERPL-MCNC: 2.4 G/DL (ref 3.4–5)
ALP SERPL-CCNC: 202 U/L (ref 40–150)
ALT SERPL W P-5'-P-CCNC: 35 U/L (ref 0–70)
ANION GAP SERPL CALCULATED.3IONS-SCNC: 5 MMOL/L (ref 3–14)
AST SERPL W P-5'-P-CCNC: 46 U/L (ref 0–45)
BACTERIA SPEC CULT: NO GROWTH
BACTERIA SPEC CULT: NO GROWTH
BILIRUB SERPL-MCNC: 0.8 MG/DL (ref 0.2–1.3)
BUN SERPL-MCNC: 17 MG/DL (ref 7–30)
CALCIUM SERPL-MCNC: 8.3 MG/DL (ref 8.5–10.1)
CHLORIDE SERPL-SCNC: 106 MMOL/L (ref 94–109)
CO2 SERPL-SCNC: 25 MMOL/L (ref 20–32)
CREAT SERPL-MCNC: 0.68 MG/DL (ref 0.66–1.25)
GFR SERPL CREATININE-BSD FRML MDRD: >90 ML/MIN/1.7M2
GLUCOSE BLDC GLUCOMTR-MCNC: 116 MG/DL (ref 70–99)
GLUCOSE BLDC GLUCOMTR-MCNC: 122 MG/DL (ref 70–99)
GLUCOSE BLDC GLUCOMTR-MCNC: 122 MG/DL (ref 70–99)
GLUCOSE BLDC GLUCOMTR-MCNC: 134 MG/DL (ref 70–99)
GLUCOSE BLDC GLUCOMTR-MCNC: 137 MG/DL (ref 70–99)
GLUCOSE BLDC GLUCOMTR-MCNC: 142 MG/DL (ref 70–99)
GLUCOSE SERPL-MCNC: 115 MG/DL (ref 70–99)
INR PPP: 1.09 (ref 0.86–1.14)
Lab: NORMAL
Lab: NORMAL
MAGNESIUM SERPL-MCNC: 2.2 MG/DL (ref 1.6–2.3)
PHOSPHATE SERPL-MCNC: 3.8 MG/DL (ref 2.5–4.5)
POTASSIUM SERPL-SCNC: 4.2 MMOL/L (ref 3.4–5.3)
PREALB SERPL IA-MCNC: 24 MG/DL (ref 15–45)
PROT SERPL-MCNC: 6.4 G/DL (ref 6.8–8.8)
SODIUM SERPL-SCNC: 136 MMOL/L (ref 133–144)
SPECIMEN SOURCE: NORMAL
SPECIMEN SOURCE: NORMAL
TRIGL SERPL-MCNC: 168 MG/DL

## 2018-03-12 PROCEDURE — 84478 ASSAY OF TRIGLYCERIDES: CPT | Performed by: COLON & RECTAL SURGERY

## 2018-03-12 PROCEDURE — 85610 PROTHROMBIN TIME: CPT | Performed by: COLON & RECTAL SURGERY

## 2018-03-12 PROCEDURE — 00000146 ZZHCL STATISTIC GLUCOSE BY METER IP

## 2018-03-12 PROCEDURE — 83735 ASSAY OF MAGNESIUM: CPT | Performed by: COLON & RECTAL SURGERY

## 2018-03-12 PROCEDURE — 84100 ASSAY OF PHOSPHORUS: CPT | Performed by: COLON & RECTAL SURGERY

## 2018-03-12 PROCEDURE — 12000000 ZZH R&B MED SURG/OB

## 2018-03-12 PROCEDURE — 80053 COMPREHEN METABOLIC PANEL: CPT | Performed by: COLON & RECTAL SURGERY

## 2018-03-12 PROCEDURE — 25000125 ZZHC RX 250: Performed by: COLON & RECTAL SURGERY

## 2018-03-12 PROCEDURE — 25000125 ZZHC RX 250: Performed by: INTERNAL MEDICINE

## 2018-03-12 PROCEDURE — 99232 SBSQ HOSP IP/OBS MODERATE 35: CPT | Performed by: HOSPITALIST

## 2018-03-12 PROCEDURE — 84134 ASSAY OF PREALBUMIN: CPT | Performed by: COLON & RECTAL SURGERY

## 2018-03-12 PROCEDURE — 99207 ZZC CDG-MDM COMPONENT: MEETS LOW - DOWN CODED: CPT | Performed by: HOSPITALIST

## 2018-03-12 PROCEDURE — 25000128 H RX IP 250 OP 636: Performed by: COLON & RECTAL SURGERY

## 2018-03-12 PROCEDURE — 25000125 ZZHC RX 250: Performed by: HOSPITALIST

## 2018-03-12 RX ADMIN — I.V. FAT EMULSION 250 ML: 20 EMULSION INTRAVENOUS at 20:52

## 2018-03-12 RX ADMIN — INSULIN ASPART 1 UNITS: 100 INJECTION, SOLUTION INTRAVENOUS; SUBCUTANEOUS at 16:29

## 2018-03-12 RX ADMIN — ENOXAPARIN SODIUM 40 MG: 40 INJECTION SUBCUTANEOUS at 12:59

## 2018-03-12 RX ADMIN — PANTOPRAZOLE SODIUM 40 MG: 40 INJECTION, POWDER, FOR SOLUTION INTRAVENOUS at 09:10

## 2018-03-12 RX ADMIN — ASCORBIC ACID, VITAMIN A PALMITATE, CHOLECALCIFEROL, THIAMINE HYDROCHLORIDE, RIBOFLAVIN-5 PHOSPHATE SODIUM, PYRIDOXINE HYDROCHLORIDE, NIACINAMIDE, DEXPANTHENOL, ALPHA-TOCOPHEROL ACETATE, VITAMIN K1, FOLIC ACID, BIOTIN, CYANOCOBALAMIN: 200; 3300; 200; 6; 3.6; 6; 40; 15; 10; 150; 600; 60; 5 INJECTION, SOLUTION INTRAVENOUS at 10:43

## 2018-03-12 ASSESSMENT — ACTIVITIES OF DAILY LIVING (ADL)
ADLS_ACUITY_SCORE: 11

## 2018-03-12 NOTE — PLAN OF CARE
Problem: Patient Care Overview  Goal: Plan of Care/Patient Progress Review  Orientation: A/O x4, calls as needed  VS stable, afebrile, denies pain    LS: clear and equal  GI: + Flatus + BM via ostomy, small output. Denies N/V. BS hypoactive. Abd rounded/slightly distended, umbilical area bulge still noted but denies pain or sensitivity to area.  : Adequate urine output with urinal at bedside  Diet: NPO except ice chips  PICC: double lumen - TPN 85ml/hr, LR 10ml/hr, lipids  Skin: midline incision with staples PRAVIN  Activity: Independent with mobility. Pt slept comfortably throughout shift.   BGs: 122 / 137  Disposition: Expected discharge TBD

## 2018-03-12 NOTE — PROGRESS NOTES
[facility]    Hospitalist Progress Note  Name: Gab Holloway    MRN: [id number]  Provider:  Neftaly Pineda DO, MPH  Date of Service: 03/12/2018    Summary of Stay: Gab Holloway is a 57 year old healthy male who was admitted on 2/26/2018 with abdominal pain/bloating. Found to have colonic mass with partial/early obstruction suspicious for malignancy. He had flex sig on 2/28 which showed partially obstructing mass- prelim pathology showed adenocarcinoma. He is s/p open colectomy w/ colostomy on 3/1/2018 per CRS-awaiting return of bowel function. Hem/onc consulted for planning of future treatment and discussion about prognosis.       Early 3/6 he developed increased productive cough and increased work of breathing.  Chest x-ray showed low lung volumes, and subsequent CT scan was negative for pulmonary embolism but revealed bilateral effusions with bibasilar infiltrates suggestive of pneumonia.  He has been started on IV Zosyn pending blood and sputum cultures.  He did have NG tube replaced as well with large output which also seemed to make him feel better.  He now has been weaned to room air.     He continues to have an ileus, and has been started on TPN as per colorectal surgery.  He is showing gradual improvement.     Problem List:   Colonic obstruction 2/2 partially obstructing adenocarcinoma of colon s/p partial colectomy w/ colostomy on 3/1/2018. Pt presented with abd pain and e/o colonic obstruction 2/2 colonic mass.  Sigmoidoscopy on 2/28, malignant obstructing mass per path.  Suspect ileus, but now resolved.  --Appreciate CRS consult.   --Status post colectomy on 3/1 without any immediate complication.  --Consulted Heme/Onc.  --PICC Placed, TPN started 3/7.     Acute hypoxemic respiratory failure: This primarily manifested itself on 3/6.  He has had productive cough of green sputum, tachypnea, and hypoxia.  CT chest was negative for PE but does show bilateral infiltrates concerning for  pneumonia.  He was started on IV Zosyn pending blood and sputum cultures.  Sputum culture growing staph aureus of questionable significant.  He remains essentially afebrile and on RA.  CT shows atelectasis. No further fevers.  --watch off zosyn  --procalcitonin moderate  --monitor respiratory status and potential fevers     Positive peritoneal wash culture: growing gram +ve rods, suspect contaminant however I am not sure about its significance.  --Defer to CRS, current plan appears to be no abx for this indication.     New dx of Adenocarcinoma of colon: Oncology on board, will need close outpatient follow up.     Asymptomatic cholelithiasis.     Back rash:  Maculopapular.  Not pruritic.  Possible drug rash.  Appears improved.  --Stopped zosyn as above and monitor rash  --Antihistamines PRN     # Pain Assessment:   Current Pain Score 3/12/2018 3/12/2018 3/11/2018   Patient currently in pain? denies denies denies   Pain score (0-10) - - 0   Pain location - - -   Pain descriptors - - -   - Gab is experiencing pain due to surgery. Pain management was discussed and the plan was created in a collaborative fashion.  Gab's response to the current recommendations: engaged  - PRN IV dilaudid and APAP    DVT Prophylaxis: Enoxaparin (Lovenox) SQ  Code Status: Full Code  Disposition: Expected discharge in 1-3 days to home. Goals prior to discharge include surgical recovery.   Incidental Findings: As above  Family updated today: Yes, spouse at bedside.     Interval History   The patient reports doing well. No chest pain or shortness of breath. No nausea, vomiting, diarrhea, constipation. No fever. Has rash on back, not itching and he reports improvement. No other specific complaints identified.     -Data reviewed today: I reviewed all new labs and imaging results over the last 24 hours.    Physical Exam   Temp: 99.2  F (37.3  C) Temp src: Oral BP: 102/63   Heart Rate: 81 Resp: 16 SpO2: 95 % O2 Device: None (Room air)     Vitals:    03/09/18 0615 03/11/18 0559 03/12/18 0548   Weight: 91.2 kg (201 lb) 86.1 kg (189 lb 12.8 oz) 91 kg (200 lb 9.6 oz)     Vital Signs with Ranges  Temp:  [96.6  F (35.9  C)-99.2  F (37.3  C)] 99.2  F (37.3  C)  Heart Rate:  [81-89] 81  Resp:  [16] 16  BP: ()/(56-73) 102/63  SpO2:  [94 %-96 %] 95 %  I/O last 3 completed shifts:  In: 2437.5 [P.O.:50; I.V.:218.4]  Out: 2950 [Urine:2600; Emesis/NG output:300; Stool:50]    GENERAL: No apparent distress. Awake, alert, and fully oriented.  HEENT: Normocephalic, atraumatic. Extraocular movements intact.  CARDIOVASCULAR: Regular rate and rhythm without murmurs or rubs. No S3.  PULMONARY: Clear bilaterally.  GASTROINTESTINAL: Soft, non-tender, non-distended. Bowel sounds normoactive.   EXTREMITIES: No cyanosis or clubbing. No edema.  NEUROLOGICAL: CN 2-12 grossly intact, no focal neurological deficits.  DERMATOLOGICAL: No ulcer, bruising, nor jaundice. Diffuse maculopapular rash on back, improved.    Medications     parenteral nutrition - Clinimix E 85 mL/hr at 03/12/18 1043     lactated ringers 10 mL/hr at 03/10/18 0810     IV fluid REPLACEMENT ONLY       - MEDICATION INSTRUCTIONS -         pantoprazole (PROTONIX) IV  40 mg Intravenous Daily with breakfast     insulin aspart  1-12 Units Subcutaneous Q4H     lipids  250 mL Intravenous Q24H     sodium chloride (PF)  10 mL Intracatheter Q7 Days     enoxaparin  40 mg Subcutaneous Q24H     Data     Laboratory:    Recent Labs  Lab 03/11/18  0800 03/08/18  0545 03/07/18  1020 03/06/18  0923  03/06/18  0718   WBC  --   --  7.9 6.5  --   --    HGB  --   --  11.2* 13.5  --  14.0   HCT  --   --  32.3* 38.6*  --   --    MCV  --   --  97 94  --   --     215 204 193  < >  --    < > = values in this interval not displayed.    Recent Labs  Lab 03/12/18  0530 03/10/18  0630 03/09/18  0650  03/08/18  0545     --  139  --  140   POTASSIUM 4.2 3.7 3.6  < > 3.2*   CHLORIDE 106  --  107  --  105   CO2 25  --  27  --   29   ANIONGAP 5  --  5  --  6   *  --  120*  --  97   BUN 17  --  11  --  12   CR 0.68  --  0.63*  --  0.68   GFRESTIMATED >90  --  >90  --  >90   GFRESTBLACK >90  --  >90  --  >90   JANICE 8.3*  --  7.6*  --  7.6*   < > = values in this interval not displayed.    Recent Labs  Lab 03/06/18  2230 03/06/18  1914 03/06/18  1356 03/06/18  1040   CULT Moderate growthNormal sarah  Moderate growthStaphylococcus aureus* No growth No growth Canceled, Test credited>10 Squamous epithelial cells/low power field indicates oral contamination. Please recollect.*  Notification of test cancellation was given Zulay Quinones RN [id number] @ 1346.cg 03/06/18       Imaging:  No results found for this or any previous visit (from the past 24 hour(s)).      Neftaly Pineda DO MPH  [facility] Hospitalist  [address] [place] [address]  Pager: [phone]  03/12/2018

## 2018-03-12 NOTE — PLAN OF CARE
Problem: Patient Care Overview  Goal: Plan of Care/Patient Progress Review  Outcome: No Change  Pt A&O, up in halls frequently, independent with ambulation, Tier A activity level. VSS, afebrile + sats maintained on RA. Denies pain. NPO, ice chips, -n/v. TPN at 85mL/hr,  + 119. Abd nondistended, BS hypoactive, ostomy pouch emptied per pt, 50cc liquid stool + flatus this evening. Adequate UOP. Staples to abd CDI.

## 2018-03-12 NOTE — PROGRESS NOTES
CLINICAL NUTRITION SERVICES - REASSESSMENT NOTE      Malnutrition: Does not meet criteria at this time       EVALUATION OF PROGRESS TOWARD GOALS   Diet:  NPO  Intake:  Remains NPO/clears (mainly negligible oral intakes) x 13 days since admission d/t post-op ileus.  NGT removed yesterday.      Nutrition Support: PN initiated 3/7, goal rate/regimen as below since 3/8.  3-day average of PN total volume indicating patient received 98% goal volume  From 3/9-3/11.      - Clinimix D15 AA5 @ 85 ml/hr + 250 mL 20% lipids daily.   - Total Parenteral Provisions: 2040 mls provides 306 g CHO, 102 g protein (1.3 g/kg), 1948 kcal (25 kcal/kg), GIR: 2.7, 26% kcal from fat.    Biochemical review:  - BG <150  - Na, K, mag, phos WNL  - No baseline TG  - ALT/AST, BUN reviewed    Wt trending:  - Current wt consistent with admit:  Vitals:    02/27/18 0208 03/08/18 0500 03/09/18 0615 03/11/18 0559   Weight: 91.2 kg (201 lb 1.6 oz) 97.2 kg (214 lb 3.2 oz) 91.2 kg (201 lb) 86.1 kg (189 lb 12.8 oz)    03/12/18 0548   Weight: 91 kg (200 lb 9.6 oz)       Dosing Weight 79.5 kg - adjusted weight       ASSESSED NUTRITION NEEDS PER APPROVED PRACTICE GUIDELINES:  Estimated Energy Needs: 2665-7510 kcals (25-30 Kcal/Kg)  Justification: maintenance  Estimated Protein Needs:  grams protein (1.2-1.5 g pro/Kg)  Justification: post-op  Estimated Fluid Needs: 3679-0053 mL (1 mL/Kcal)  Justification: maintenance and per provider pending fluid status      NEW FINDINGS:   - Medications reviewed.  - Colostomy output:  3/7 - 580 mls  3/8 - 475 mls  3/9 - 50 mls  3/10 - 100 mls  3/11 - 50 mls    Previous Goals:   Diet advancement past clears w/in 48 hrs.  Evaluation: Not met, though now PN reliant    Previous Nutrition Diagnosis:   Inadequate protein-energy intake related to inadequate oral intake due to GI dysfunction with TPN requirement not yet at goal as evidenced by NPO diet order with intakes <25% estimated needs x9+ days and PN start yesterday  meeting <60% energy and protein needs x1 day with plan for increase to goal tonight.   Evaluation: Completed, changed below       MALNUTRITION (reassessed 3/12/2018)  % Weight Loss:  None noted --> during admit  % Intake:  No decreased intake noted --> PN reliant  Subcutaneous Fat Loss:  None observed  Muscle Loss:  None observed  Fluid Retention:  None noted    Malnutrition Diagnosis: Patient does not meet two of the above criteria necessary for diagnosing malnutrition      CURRENT NUTRITION DIAGNOSIS  Inadequate oral intake related to altered GI function post-op as evidenced by NPO/clears with negligible oral intakes (meeting <25% needs orally) x 13 days since admission with PN reliance since 3/7, 3-day average intake indicating PN meeting 98% goal total volume.    INTERVENTIONS  Recommendations / Nutrition Prescription  Continue current PN regimen as outlined above.  Add-on TG.    Diet per CRS.  With diet advancement and as able/appropriate, will provide low-fiber diet education as previously ordered.        Implementation  Collaboration and Referral of Nutrition care: POC discussed with team during rounds.    Goals  Average PN intake to meet >/=90% goal total volume.  Patient to meet >/=60% needs orally to justify d/c of PN.      MONITORING AND EVALUATION:  Progress towards goals will be monitored and evaluated per protocol and Practice Guidelines      Geovanna Vu RD, LD  Clinical Dietitian  3rd floor/ICU: [phone]  All other floors: [phone]  Weekend/holiday: [phone]

## 2018-03-12 NOTE — PROVIDER NOTIFICATION
MD text paged: Pt and wife are wondering if we can get an add on for hgb for todays labs or for tomorrow's draw?

## 2018-03-12 NOTE — PROGRESS NOTES
COLON & RECTAL SURGERY  PROGRESS NOTE    March 12, 2018  Post-op Day # 11 left colectomy with transverse colostomy for obstructing cancer    SUBJECTIVE:  Pt feeling improved this morning. He denies abdominal pain or nausea. He is hopeful to eat today. He ambulated three times yesterday and is urinating without difficulty.     OBJECTIVE:  Temp:  [96.6  F (35.9  C)-99.2  F (37.3  C)] 99.2  F (37.3  C)  Heart Rate:  [81-89] 81  Resp:  [16] 16  BP: ()/(56-73) 102/63  SpO2:  [94 %-96 %] 95 %    Intake/Output Summary (Last 24 hours) at 03/12/18 0814  Last data filed at 03/12/18 0700   Gross per 24 hour   Intake           2437.5 ml   Output             3150 ml   Net           -712.5 ml       GENERAL:  Awake, alert, no acute distress, lying in bed.   HEAD: Nomocephalic atraumatic  SCLERA: anicteric  EXTREMITIES: warm and well perfused  ABDOMEN:  Soft, appropriately tender, improved distention, no rebound or guarding, no peritoneal signs. Stoma pink and viable with minimal slough and stool in bag.   INCISION:  C/d/i, staples without erythema, induration, or fluctuance    LABS:  Lab Results   Component Value Date    WBC 7.9 03/07/2018     Lab Results   Component Value Date    HGB 11.2 03/07/2018     Lab Results   Component Value Date    HCT 32.3 03/07/2018     Lab Results   Component Value Date     03/11/2018     Last Basic Metabolic Panel:  Lab Results   Component Value Date     03/12/2018      Lab Results   Component Value Date    POTASSIUM 4.2 03/12/2018     Lab Results   Component Value Date    CHLORIDE 106 03/12/2018     Lab Results   Component Value Date    JANICE 8.3 03/12/2018     Lab Results   Component Value Date    CO2 25 03/12/2018     Lab Results   Component Value Date    BUN 17 03/12/2018     Lab Results   Component Value Date    CR 0.68 03/12/2018     Lab Results   Component Value Date     03/12/2018       ASSESSMENT/PLAN: POD 11 left colectomy with transverse colostomy for obstructing  cancer. Pt remains afebrile, VSS. NG tube removed 3/11.     1. Advance to clear liquid diet  2. Continue TPN until tolerating more of a diet  3. PRN pain control, minimize narcotics  4. Lovenox for prophylaxis  5. Encourage OOB, ambulate    Christina Gordon PA-C  [facility]  Phone: [phone]     Colon and Rectal Surgery Attending Note    Patient seen and examined independently.  Agree with above assessment and plan.  Up in the chair. No nausea, no pain.   abd round, incision with staples,  Stoma pink and productive of gas  Plan as above  Clears, TPN for now.  lovenox teaching for discharge.  Stoma care and teaching    Joann Delcid MD  [facility]  Office Phone # [phone]

## 2018-03-12 NOTE — PROGRESS NOTES
Oncology Chart Check:    Reviewed notes from primary team and colorectal surgery.  Noted removal of NGT and advancement of diet to clear liquids.    - No new recommendations  - Will follow up with me in 3 months for colon cancer surveillance    Raj Luke M.D.  [facility]

## 2018-03-13 LAB
BASOPHILS # BLD AUTO: 0 10E9/L (ref 0–0.2)
BASOPHILS NFR BLD AUTO: 0.4 %
DIFFERENTIAL METHOD BLD: ABNORMAL
EOSINOPHIL # BLD AUTO: 0.1 10E9/L (ref 0–0.7)
EOSINOPHIL NFR BLD AUTO: 1.1 %
ERYTHROCYTE [DISTWIDTH] IN BLOOD BY AUTOMATED COUNT: 15.4 % (ref 10–15)
GLUCOSE BLDC GLUCOMTR-MCNC: 119 MG/DL (ref 70–99)
GLUCOSE BLDC GLUCOMTR-MCNC: 122 MG/DL (ref 70–99)
GLUCOSE BLDC GLUCOMTR-MCNC: 122 MG/DL (ref 70–99)
GLUCOSE BLDC GLUCOMTR-MCNC: 125 MG/DL (ref 70–99)
GLUCOSE BLDC GLUCOMTR-MCNC: 131 MG/DL (ref 70–99)
GLUCOSE BLDC GLUCOMTR-MCNC: 97 MG/DL (ref 70–99)
HCT VFR BLD AUTO: 34.9 % (ref 40–53)
HGB BLD-MCNC: 11.5 G/DL (ref 13.3–17.7)
IMM GRANULOCYTES # BLD: 0.1 10E9/L (ref 0–0.4)
IMM GRANULOCYTES NFR BLD: 0.8 %
LYMPHOCYTES # BLD AUTO: 1.7 10E9/L (ref 0.8–5.3)
LYMPHOCYTES NFR BLD AUTO: 16.7 %
MCH RBC QN AUTO: 32.6 PG (ref 26.5–33)
MCHC RBC AUTO-ENTMCNC: 33 G/DL (ref 31.5–36.5)
MCV RBC AUTO: 99 FL (ref 78–100)
MONOCYTES # BLD AUTO: 1.1 10E9/L (ref 0–1.3)
MONOCYTES NFR BLD AUTO: 10.5 %
NEUTROPHILS # BLD AUTO: 7.4 10E9/L (ref 1.6–8.3)
NEUTROPHILS NFR BLD AUTO: 70.5 %
NRBC # BLD AUTO: 0 10*3/UL
NRBC BLD AUTO-RTO: 0 /100
PLATELET # BLD AUTO: 366 10E9/L (ref 150–450)
RBC # BLD AUTO: 3.53 10E12/L (ref 4.4–5.9)
WBC # BLD AUTO: 10.5 10E9/L (ref 4–11)

## 2018-03-13 PROCEDURE — 99232 SBSQ HOSP IP/OBS MODERATE 35: CPT | Performed by: HOSPITALIST

## 2018-03-13 PROCEDURE — 85025 COMPLETE CBC W/AUTO DIFF WBC: CPT | Performed by: COLON & RECTAL SURGERY

## 2018-03-13 PROCEDURE — 00000146 ZZHCL STATISTIC GLUCOSE BY METER IP

## 2018-03-13 PROCEDURE — 25000132 ZZH RX MED GY IP 250 OP 250 PS 637: Performed by: COLON & RECTAL SURGERY

## 2018-03-13 PROCEDURE — G0463 HOSPITAL OUTPT CLINIC VISIT: HCPCS

## 2018-03-13 PROCEDURE — 25000128 H RX IP 250 OP 636: Performed by: COLON & RECTAL SURGERY

## 2018-03-13 PROCEDURE — 25000125 ZZHC RX 250: Performed by: COLON & RECTAL SURGERY

## 2018-03-13 PROCEDURE — 12000000 ZZH R&B MED SURG/OB

## 2018-03-13 PROCEDURE — 25000125 ZZHC RX 250: Performed by: HOSPITALIST

## 2018-03-13 RX ORDER — LANOLIN ALCOHOL/MO/W.PET/CERES
3 CREAM (GRAM) TOPICAL
Status: DISCONTINUED | OUTPATIENT
Start: 2018-03-13 | End: 2018-03-14 | Stop reason: HOSPADM

## 2018-03-13 RX ORDER — ACETAMINOPHEN 325 MG/1
975 TABLET ORAL EVERY 8 HOURS PRN
Status: DISCONTINUED | OUTPATIENT
Start: 2018-03-13 | End: 2018-03-14 | Stop reason: HOSPADM

## 2018-03-13 RX ORDER — OXYCODONE HYDROCHLORIDE 5 MG/1
5 TABLET ORAL EVERY 4 HOURS PRN
Status: DISCONTINUED | OUTPATIENT
Start: 2018-03-13 | End: 2018-03-14 | Stop reason: HOSPADM

## 2018-03-13 RX ADMIN — ENOXAPARIN SODIUM 40 MG: 40 INJECTION SUBCUTANEOUS at 14:37

## 2018-03-13 RX ADMIN — MELATONIN TAB 3 MG 3 MG: 3 TAB at 22:12

## 2018-03-13 RX ADMIN — I.V. FAT EMULSION 250 ML: 20 EMULSION INTRAVENOUS at 20:35

## 2018-03-13 RX ADMIN — OMEPRAZOLE 20 MG: 20 CAPSULE, DELAYED RELEASE ORAL at 09:21

## 2018-03-13 RX ADMIN — ASCORBIC ACID, VITAMIN A PALMITATE, CHOLECALCIFEROL, THIAMINE HYDROCHLORIDE, RIBOFLAVIN-5 PHOSPHATE SODIUM, PYRIDOXINE HYDROCHLORIDE, NIACINAMIDE, DEXPANTHENOL, ALPHA-TOCOPHEROL ACETATE, VITAMIN K1, FOLIC ACID, BIOTIN, CYANOCOBALAMIN: 200; 3300; 200; 6; 3.6; 6; 40; 15; 10; 150; 600; 60; 5 INJECTION, SOLUTION INTRAVENOUS at 10:39

## 2018-03-13 ASSESSMENT — ACTIVITIES OF DAILY LIVING (ADL)
ADLS_ACUITY_SCORE: 11

## 2018-03-13 NOTE — PROGRESS NOTES
"[facility] Nurse Inpatient Ostomy Assessment      Assessment of ostomy and needs for:   New Colostomy      Data:   History:      Per MD note(s): Summary of Stay: Gab Holloway is a 57 year old healthy male who was admitted on 2/26/2018 with abdominal pain/bloating. Found to have colonic mass with partial/early obstruction  for malignancy. He had flex sig on 2/28 which showed partially obstructing mass- prelim pathology showed adenocarcinoma. He is s/p open colectomy w/ colostomy on 3/1/2018. Hem/onc consulted for planning of future treatment and discussion about prognosis.     Type of ostomy:  Colostomy   Stoma assessment:   ? Size of stoma: slightly smaller than 2\" and slightly oval from 9 to 3 o'clock, stoma is beefy red, moist, less edematous, firm, protruberant, RRC fell out of Os. Part of the stoma cap fell off when cleansed  Mucocutaneous Junction (MCJ):   Intact with sutures  Peristomal skin:  intact  Abdominal assessment: firm, distended, intact staple line  Output:  small, pasty brown/ green stool    Intake/Output Summary (Last 24 hours) at 03/13/18 1528  Last data filed at 03/13/18 1438   Gross per 24 hour   Intake             4570 ml   Output             2935 ml   Net             1635 ml       Active Diet Order      Diet      Full Liquid Diet    Pt on TPN  Labs:   Recent Labs   Lab Test  03/13/18   0510  03/12/18   0530   ALBUMIN   --   2.4*   HGB  11.5*   --    INR   --   1.09   WBC  10.5   --          Intervention:     Patient's chart evaluated.      Assessments done today:  Stoma, pouch system changed, readiness to learn, supply needs reviewed    Education: demonstration on pouch change with pt and then pouch changed, discussion on skin care, product options from 1-2 pc and closure systems    Prepared for discharge by: supplies reviewed    Pt registered for ostomy supply samples? On Discharge- Coloplast         Assessment:     Stoma assessment: healthy, beefy red, no active " bleeding noted, beginning to function.     Pouch changed to Coloplast 1 pc with Velcro closure and ring.     Learning needs/ comprehension: pt and spouse observed and assisted with some aspects of the pouch change,  Both asked appropriate questions and were very engaged with education and pouch change and feel ready for discharge and care at home.      Effectiveness of current pouching/ supply plan: TBD; prefers 1 pc Coloplast system         Plan:     Plan:   ? Current pouching system: Coloplast 1 pc pouch.  Additional supplies ordered and in room.   ?  Continue to prepare for d/c - will finalize supplies; Rx and d/c instructions prior to discharge  o Do WOC Nurse recommend home care:  No, per spouse and pt. Spouse was present for extensive education prior to surgery and today and states that she feel ready to take the pt home. She understands that WOC is available as resource. Spouse will call insurance company to find appropriate DME.

## 2018-03-13 NOTE — PLAN OF CARE
Problem: Patient Care Overview  Goal: Plan of Care/Patient Progress Review  Orientation: A/O x4, calls as needed  VS stable, afebrile, denies pain    LS: clear and equal  GI: + Flatus + BM via ostomy-independent with cares. Denies N/V. BS active  : Adequate urine output.   Diet: Clear Liquid with TPN/Lipids  PICC: LR 10ml/hr, TPN 85ml/hr, Lipids 20.8ml/hr  Skin: Midline incision with staples PRAVIN and WDL, bulge to right-side of incision still present with rounded/distended abd  Activity: Independent with mobility. Pt slept comfortably throughout shift.   BGs: 122 / 131  Disposition: Expected discharge TBD

## 2018-03-13 NOTE — PROGRESS NOTES
COLON & RECTAL SURGERY PROGRESS NOTE    Post-op Day 12      SUBJECTIVE:  Pain well controlled on oral/IV analgesics. Tolerating clear diet. Passing flatus/BM into stoma. No nausea/vomiting. Voiding. Ambulating. Denies fever, chills, chest pain, shortness of breath.        OBJECTIVE:  Temp:  [97  F (36.1  C)-98.6  F (37  C)] 98.3  F (36.8  C)  Pulse:  [86-91] 91  Heart Rate:  [84] 84  Resp:  [18-21] 18  BP: (103-111)/(64-71) 111/64  SpO2:  [94 %] 94 %      Intake/Output Summary (Last 24 hours) at 03/13/18 0801  Last data filed at 03/13/18 0507   Gross per 24 hour   Intake             3333 ml   Output             1985 ml   Net             1348 ml       Physical Exam:  GEN: well developed, well nourished in no acute distress  HEENT: sclerae anicteric  CV: RRR  PULM: non-labored respirations  ABD: soft, non-tender, non distended but protuberant abdomen, midline incision c/d/i w staples; stoma pink with air/stool in bag  EXT: warm, well perfused, no edema  NEURO: A&Ox3, non-focal  PSYCH: cooperative    LABS:   Lab Results   Component Value Date    WBC 10.5 03/13/2018    HGB 11.5 (L) 03/13/2018    HCT 34.9 (L) 03/13/2018     03/13/2018     03/12/2018    POTASSIUM 4.2 03/12/2018    CHLORIDE 106 03/12/2018    CO2 25 03/12/2018    BUN 17 03/12/2018    CR 0.68 03/12/2018     (H) 03/12/2018    AST 46 (H) 03/12/2018    ALT 35 03/12/2018    ALKPHOS 202 (H) 03/12/2018    BILITOTAL 0.8 03/12/2018    INR 1.09 03/12/2018       ASSESSMENT/PLAN: 57M POD#12 status post open L colectomy w end colostomy for pT3N0 obstructing colon CA. Now w recovery of bowel function. AVSS - exam benign.     - ambulate/pulmonary toilet  - Full liquid to regular diet  - continue TPN today, wean prior to discharge  - lovenox DVT ppx  - Will remove staples prior to discharge  - discharge planning     Will update Dr. Delcid.     --  Sebas Toscano MD MPH  Colon and Rectal Surgery Fellow  [facility]  Pager: [phone]      Colon and Rectal Surgery Attending Note    Patient seen and examined independently.  Agree with above assessment and plan.  Tolerating full liquid diet, + flatus in the bag, liquid output.  abd soft, stoma pink, staples in place  Plan  Low residue diet  Encourage walking    Joann Delcid MD  [facility]  Office Phone # [phone]

## 2018-03-13 NOTE — PROGRESS NOTES
[facility]    Hospitalist Progress Note  Name: Gab Holloway    MRN: [id number]  Provider:  Neftaly Pineda DO MPH  Date of Service: 03/13/2018    Summary of Stay: Gab Holloway is a 57 year old healthy male who was admitted on 2/26/2018 with abdominal pain/bloating. Found to have colonic mass with partial/early obstruction suspicious for malignancy. He had flex sig on 2/28 which showed partially obstructing mass- prelim pathology showed adenocarcinoma. He is s/p open colectomy w/ colostomy on 3/1/2018 per CRS-awaiting return of bowel function. Hem/onc consulted for planning of future treatment and discussion about prognosis.       Early 3/6 he developed increased productive cough and increased work of breathing.  Chest x-ray showed low lung volumes, and subsequent CT scan was negative for pulmonary embolism but revealed bilateral effusions with bibasilar infiltrates suggestive of pneumonia.  He has been started on IV Zosyn pending blood and sputum cultures.  He did have NG tube replaced as well with large output which also seemed to make him feel better.  He now has been weaned to room air.     He continues to have an ileus, and has been started on TPN as per colorectal surgery.  He is showing gradual improvement.     Problem List:   Colonic obstruction 2/2 partially obstructing adenocarcinoma of colon s/p partial colectomy w/ colostomy on 3/1/2018. Pt presented with abd pain and e/o colonic obstruction 2/2 colonic mass.  Sigmoidoscopy on 2/28, malignant obstructing mass per path.  Suspect ileus, but now resolved.  --Appreciate CRS consult.   --Status post colectomy on 3/1 without any immediate complication.  --Follow up with Heme/Onc as OP.  --PICC Placed, TPN started 3/7, will likely d/c tomorrow.  --FLD.     Acute hypoxemic respiratory failure: This primarily manifested itself on 3/6.  He has had productive cough of green sputum, tachypnea, and hypoxia.  CT chest was negative for PE but does  show bilateral infiltrates concerning for pneumonia.  He was started on IV Zosyn pending blood and sputum cultures.  Sputum culture growing staph aureus of questionable significant.  He remains essentially afebrile and on RA.  CT shows atelectasis. No further fevers.  --watch off zosyn  --procalcitonin moderate  --monitor respiratory status and potential fevers     Positive peritoneal wash culture: growing gram +ve rods, suspect contaminant however I am not sure about its significance.  --Defer to CRS, current plan appears to be no abx for this indication.     New dx of Adenocarcinoma of colon: Oncology on board, will need close outpatient follow up.     Asymptomatic cholelithiasis.     Back rash:  Maculopapular.  Not pruritic.  Possible drug rash.  Appears improved.  --Stopped zosyn as above and monitor rash  --Antihistamines PRN     # Pain Assessment:   Current Pain Score 3/13/2018 3/12/2018 3/12/2018   Patient currently in pain? denies denies no   Pain score (0-10) 0 - 0   Pain location - - -   Pain descriptors - - -   - Denies pain.    DVT Prophylaxis: Enoxaparin (Lovenox) SQ  Code Status: Full Code  Disposition: Expected discharge in 1 days to home. Goals prior to discharge include surgical recovery.   Incidental Findings: As above  Family updated today: Yes, spouse at bedside.     Interval History   The patient reports doing well. No chest pain or shortness of breath. No nausea, vomiting, diarrhea, constipation. No fever. Has rash on back, not itching and he reports improvement. No other specific complaints identified.     -Data reviewed today: I reviewed all new labs and imaging results over the last 24 hours.    Physical Exam   Temp: 97.1  F (36.2  C) Temp src: Oral BP: 103/67 Pulse: 91 Heart Rate: 81 Resp: 18 SpO2: 95 % O2 Device: Nasal cannula    Vitals:    03/11/18 0559 03/12/18 0548 03/13/18 0507   Weight: 86.1 kg (189 lb 12.8 oz) 91 kg (200 lb 9.6 oz) 84.6 kg (186 lb 9.6 oz)     Vital Signs with  Ranges  Temp:  [97  F (36.1  C)-98.6  F (37  C)] 97.1  F (36.2  C)  Pulse:  [86-91] 91  Heart Rate:  [81-84] 81  Resp:  [18-21] 18  BP: (103-111)/(64-71) 103/67  SpO2:  [94 %-95 %] 95 %  I/O last 3 completed shifts:  In: 3333 [P.O.:880; I.V.:234]  Out: 2385 [Urine:1995; Stool:390]    GENERAL: No apparent distress. Awake, alert, and fully oriented.  HEENT: Normocephalic, atraumatic. Extraocular movements intact.  CARDIOVASCULAR: Regular rate and rhythm without murmurs or rubs. No S3.  PULMONARY: Clear bilaterally.  GASTROINTESTINAL: Soft, non-tender, non-distended. Bowel sounds normoactive.   EXTREMITIES: No cyanosis or clubbing. No edema.  NEUROLOGICAL: CN 2-12 grossly intact, no focal neurological deficits.  DERMATOLOGICAL: No ulcer, bruising, nor jaundice. Diffuse maculopapular rash on back, improved.    Medications     parenteral nutrition - Clinimix E 85 mL/hr at 03/12/18 2100     lactated ringers 10 mL/hr at 03/12/18 2100     IV fluid REPLACEMENT ONLY       - MEDICATION INSTRUCTIONS -         omeprazole  20 mg Oral QAM AC     insulin aspart  1-12 Units Subcutaneous Q4H     lipids  250 mL Intravenous Q24H     sodium chloride (PF)  10 mL Intracatheter Q7 Days     enoxaparin  40 mg Subcutaneous Q24H     Data     Laboratory:    Recent Labs  Lab 03/13/18  0510 03/11/18  0800 03/08/18  0545 03/07/18  1020   WBC 10.5  --   --  7.9   HGB 11.5*  --   --  11.2*   HCT 34.9*  --   --  32.3*   MCV 99  --   --  97    324 215 204       Recent Labs  Lab 03/12/18  0530 03/10/18  0630 03/09/18  0650  03/08/18  0545     --  139  --  140   POTASSIUM 4.2 3.7 3.6  < > 3.2*   CHLORIDE 106  --  107  --  105   CO2 25  --  27  --  29   ANIONGAP 5  --  5  --  6   *  --  120*  --  97   BUN 17  --  11  --  12   CR 0.68  --  0.63*  --  0.68   GFRESTIMATED >90  --  >90  --  >90   GFRESTBLACK >90  --  >90  --  >90   JANICE 8.3*  --  7.6*  --  7.6*   < > = values in this interval not displayed.    Recent Labs  Lab  03/06/18  2230 03/06/18  1914 03/06/18  1356 03/06/18  1040   CULT Moderate growthNormal sarah  Moderate growthStaphylococcus aureus* No growth No growth Canceled, Test credited>10 Squamous epithelial cells/low power field indicates oral contamination. Please recollect.*  Notification of test cancellation was given Zulay Quinones RN [id number] @ 1346.cg 03/06/18       Imaging:  No results found for this or any previous visit (from the past 24 hour(s)).      Neftaly Pineda DO MPH  Formerly [facility] Hospitalist  [address] [place] [address]  Pager: [phone]  03/13/2018

## 2018-03-13 NOTE — PLAN OF CARE
Problem: Patient Care Overview  Goal: Plan of Care/Patient Progress Review  Ambulatory Status:  Pt up independently ambulating in halls with spouse  VS:  Stable and afebrile  Pain:  denies  Resp: Regular and lung sounds clear. IS performed.  GI: abdomen distended. no complaints of nausea or abdominal discomfort. BS normoactive and passing flatus through stoma. Diet advanced from clear liquid to full. Pt is tolerating well.   Skin:  Midline incision CDI with bulge on left side     Plan of care discussed with patient and spouse. Lovenox teaching was done.

## 2018-03-13 NOTE — PROGRESS NOTES
Oncology/Hematology Follow Up Note:    Assessment and Plan:  #1 Obstructive left-sided colon cancer, stage II, s/p left hemicolectomy      It was my pleasure to see the patient in the hospital today.  He underwent left hemicolectomy for his newly diagnosed obstructive left-sided colon cancer last Thursday.  Pathology report showed a grade 2 T3 tumor.  All resected lymph nodes were negative.  Surgical margins were negative.  MMR proficient      PLAN:  1. Explained to the patient that he has stage II colon cancer.  The obstructive nature of the tumor is a negative prognostic factor, but it is moderately differentiated and T3 instead of T4.  There was no perineural or lymphovascular invasion.  Therefore, I think the risk of recurrence is too low to justify adjuvant chemotherapy  2. Arranged follow-up as an outpatient in 3 months for the 1st surveillance visit.      #2 Post-op ileus, resolving  - Diet will be further advanced to full liquids today  - On TPN  - Continue to follow colorectal surgery and nutrition recommendations.      Raj Luke M.D.  [facility]  [phone]    Subjective:    The patient tolerated clear liquid diet well yesterday.  His diet will be further advanced today.  At the same time, he continues on TPN.  No new issues.      Labs:  All labs reviewed    CBC  Recent Labs  Lab 03/13/18  0510 03/11/18  0800 03/08/18  0545 03/07/18  1020   WBC 10.5  --   --  7.9   HGB 11.5*  --   --  11.2*   MCV 99  --   --  97    324 215 204       CMP  Recent Labs  Lab 03/12/18  0530 03/10/18  0630 03/09/18  0650 03/08/18  2210 03/08/18  0545 03/07/18  1020     --  139  --  140 141   POTASSIUM 4.2 3.7 3.6 3.2* 3.2* 3.3*   CHLORIDE 106  --  107  --  105 106   CO2 25  --  27  --  29 29   ANIONGAP 5  --  5  --  6 6   *  --  120*  --  97 97   BUN 17  --  11  --  12 15   CR 0.68  --  0.63*  --  0.68 0.66   GFRESTIMATED >90  --  >90  --  >90 >90   GFRESTBLACK >90  --  >90  --  >90 >90   JANICE 8.3*   --  7.6*  --  7.6* 7.7*   MAG 2.2 2.1 2.3  --  1.9 2.2   PHOS 3.8 2.8 2.6  --  3.0 2.1*   PROTTOTAL 6.4*  --   --   --  5.4* 5.5*   ALBUMIN 2.4*  --   --   --  1.9* 1.9*   BILITOTAL 0.8  --   --   --  0.8 1.2   ALKPHOS 202*  --   --   --  134 127   AST 46*  --   --   --  71* 150*   ALT 35  --   --   --  63 87*       INR  Recent Labs  Lab 03/12/18  0530 03/07/18  1020   INR 1.09 1.12       Blood Culture  Recent Labs  Lab 03/06/18  2230 03/06/18  1914 03/06/18  1356   CULT Moderate growthNormal sarah  Moderate growthStaphylococcus aureus* No growth No growth         Raj Luke MD  [facility]  3/13/2018 1:19 PM

## 2018-03-13 NOTE — PLAN OF CARE
Problem: Patient Care Overview  Goal: Plan of Care/Patient Progress Review  Outcome: Improving  Pt POD 11 from colectomy with colostomy. Pt up Ind, A&O x4, + stool, +gas, feeling a little weak at the start of the shift, but resolved this shift. PICC infusing TPN running @ 85ml/hr, lipids running, Tolerating clears but is hopeful for an advancement diet tomorrow.

## 2018-03-14 VITALS
BODY MASS INDEX: 26.71 KG/M2 | WEIGHT: 186.6 LBS | HEART RATE: 91 BPM | TEMPERATURE: 96.7 F | HEIGHT: 70 IN | RESPIRATION RATE: 18 BRPM | DIASTOLIC BLOOD PRESSURE: 63 MMHG | SYSTOLIC BLOOD PRESSURE: 111 MMHG | OXYGEN SATURATION: 96 %

## 2018-03-14 LAB
ANION GAP SERPL CALCULATED.3IONS-SCNC: 7 MMOL/L (ref 3–14)
BUN SERPL-MCNC: 21 MG/DL (ref 7–30)
CALCIUM SERPL-MCNC: 8.1 MG/DL (ref 8.5–10.1)
CHLORIDE SERPL-SCNC: 104 MMOL/L (ref 94–109)
CO2 SERPL-SCNC: 26 MMOL/L (ref 20–32)
CREAT SERPL-MCNC: 0.68 MG/DL (ref 0.66–1.25)
GFR SERPL CREATININE-BSD FRML MDRD: >90 ML/MIN/1.7M2
GLUCOSE BLDC GLUCOMTR-MCNC: 113 MG/DL (ref 70–99)
GLUCOSE BLDC GLUCOMTR-MCNC: 132 MG/DL (ref 70–99)
GLUCOSE BLDC GLUCOMTR-MCNC: 149 MG/DL (ref 70–99)
GLUCOSE BLDC GLUCOMTR-MCNC: 161 MG/DL (ref 70–99)
GLUCOSE SERPL-MCNC: 119 MG/DL (ref 70–99)
MAGNESIUM SERPL-MCNC: 2.2 MG/DL (ref 1.6–2.3)
PHOSPHATE SERPL-MCNC: 3.5 MG/DL (ref 2.5–4.5)
PLATELET # BLD AUTO: 339 10E9/L (ref 150–450)
POTASSIUM SERPL-SCNC: 4.2 MMOL/L (ref 3.4–5.3)
SODIUM SERPL-SCNC: 137 MMOL/L (ref 133–144)

## 2018-03-14 PROCEDURE — 00000146 ZZHCL STATISTIC GLUCOSE BY METER IP

## 2018-03-14 PROCEDURE — 83735 ASSAY OF MAGNESIUM: CPT | Performed by: HOSPITALIST

## 2018-03-14 PROCEDURE — 80048 BASIC METABOLIC PNL TOTAL CA: CPT | Performed by: HOSPITALIST

## 2018-03-14 PROCEDURE — 99238 HOSP IP/OBS DSCHRG MGMT 30/<: CPT | Performed by: INTERNAL MEDICINE

## 2018-03-14 PROCEDURE — 25000128 H RX IP 250 OP 636: Performed by: COLON & RECTAL SURGERY

## 2018-03-14 PROCEDURE — 85049 AUTOMATED PLATELET COUNT: CPT | Performed by: HOSPITALIST

## 2018-03-14 PROCEDURE — 40000903 ZZH STATISTIC WOC PT EDUCATION, 31-45 MIN

## 2018-03-14 PROCEDURE — 84100 ASSAY OF PHOSPHORUS: CPT | Performed by: HOSPITALIST

## 2018-03-14 RX ADMIN — ENOXAPARIN SODIUM 40 MG: 40 INJECTION SUBCUTANEOUS at 12:02

## 2018-03-14 RX ADMIN — INSULIN ASPART 1 UNITS: 100 INJECTION, SOLUTION INTRAVENOUS; SUBCUTANEOUS at 00:23

## 2018-03-14 ASSESSMENT — ACTIVITIES OF DAILY LIVING (ADL)
ADLS_ACUITY_SCORE: 11

## 2018-03-14 NOTE — PROGRESS NOTES
COLON & RECTAL SURGERY  PROGRESS NOTE    March 14, 2018  Post-op Day # 13 left colectomy with transverse colostomy for obstructing cancer    SUBJECTIVE: Pt went slowly with food and tolerated this well without increased pain and nausea. He is ambulating often.     OBJECTIVE:  Temp:  [96.4  F (35.8  C)-99  F (37.2  C)] 96.7  F (35.9  C)  Heart Rate:  [80-93] 80  Resp:  [16-20] 18  BP: (103-120)/(62-67) 111/63  SpO2:  [95 %-98 %] 96 %    Intake/Output Summary (Last 24 hours) at 03/14/18 0754  Last data filed at 03/14/18 0751   Gross per 24 hour   Intake           3390.4 ml   Output             3205 ml   Net            185.4 ml       GENERAL:  Awake, alert, no acute distress, lying in bed  HEAD: Nomocephalic atraumatic  SCLERA: anicteric  EXTREMITIES: warm and well perfused  ABDOMEN:  Soft, appropriately tender, non-distended, no rebound or guarding, no peritoneal signs  INCISION:  C/d/i, staples removed    LABS:  Lab Results   Component Value Date    WBC 10.5 03/13/2018     Lab Results   Component Value Date    HGB 11.5 03/13/2018     Lab Results   Component Value Date    HCT 34.9 03/13/2018     Lab Results   Component Value Date     03/14/2018     Last Basic Metabolic Panel:  Lab Results   Component Value Date     03/14/2018      Lab Results   Component Value Date    POTASSIUM 4.2 03/14/2018     Lab Results   Component Value Date    CHLORIDE 104 03/14/2018     Lab Results   Component Value Date    JANICE 8.1 03/14/2018     Lab Results   Component Value Date    CO2 26 03/14/2018     Lab Results   Component Value Date    BUN 21 03/14/2018     Lab Results   Component Value Date    CR 0.68 03/14/2018     Lab Results   Component Value Date     03/14/2018       ASSESSMENT/PLAN: POD 13 left colectomy with transverse colostomy for obstructing cancer. Pt remains afebrile, VSS.    1. Low fiber diet, wean TPN  2. Encourage OOB, ambulate  3. Lovenox for prophylaxis, needs 30d total upon discharge and  teaching  4. Dispo: d/c home today with HHC pending weaned off TPN successfully    Christina Gordon PA-C  [facility]  Phone: [phone]       Colon and Rectal Surgery Attending Note    Patient seen and examined independently.  Agree with above assessment and plan.  Feeling much better, tolerating low res diet. No pain, no nausea or vomiting. Up walking, managing the stoma  abd soft, staples out. Stoma pink  Plan  Home today with HHC  Low res diet, follow up in 3 weeks in the office  Lovenox at discharge given the cancer DX.    Joann Delcid MD  [facility]  Office Phone # [phone]      ADDENDUM:  Length of stay: 13 days  Indicate Y or N for the following:  UTI  No  C diff  No  PNA  No  SSI No  DVT No  PE  No  CVA No  MI No  Enterocutaneous fistula  No  Peripheral nerve injury  No  Abscess (not adjacent to anastomosis)  No  Leak No    Treated with:   Antibiotics N/A   Drain  N/A   Reoperation  N/A  Death within 30 days No  Reintubation  No  Reoperation  No   Procedure     FOR CANCER CASES:  T stage: 3  N stage: 0   Total number of nodes: 24   Total positive: 0  M stage: n/a  R:   TME grade, if known (1,2,3):   MSI (pos, neg):       Time spent:  less than 30 minutes spent in counseling and coordination of care for discharge.

## 2018-03-14 NOTE — PLAN OF CARE
Problem: Patient Care Overview  Goal: Plan of Care/Patient Progress Review  Outcome: Improving  Pt A&O, up in halls independently, Tier A activity level. VSS, afebrile + sats maintained on RA. Denies pain. TPN at 85mL/hr, BG 97 + 125, no SSI required. Advanced to low fiber diet, tolerating well, -n/v. Abd nondistended, BS+, +flatus and stool per ostomy, pt emptying independently. Adequate UOP. Possible DC tomorrow.

## 2018-03-14 NOTE — PROGRESS NOTES
Pt is scheduled with Dr Delcid 3/21/18  at 10:00am.  Pt needs to f/u with Dr. Luke [facility] in 3 months and establish care with Primary care provider.  He prefers to do this at home when he can see his calendar.  DC AVS updated with all information so they can schedule appointments.  Wife also in agreement.  MADY Cool RN CTS [phone]

## 2018-03-14 NOTE — PROGRESS NOTES
"[facility] Nurse Inpatient Ostomy Assessment      Assessment of ostomy and needs for:   New Colostomy      Data:   History:      Per MD note(s): Summary of Stay: Gab Holloway is a 57 year old healthy male who was admitted on 2/26/2018 with abdominal pain/bloating. Found to have colonic mass with partial/early obstruction  for malignancy. He had flex sig on 2/28 which showed partially obstructing mass- prelim pathology showed adenocarcinoma. He is s/p open colectomy w/ colostomy on 3/1/2018. Hem/onc consulted for planning of future treatment and discussion about prognosis.     Type of ostomy:  Colostomy   Stoma assessment: pouch changed 3/13  ? Size of stoma: slightly smaller than 2\" and slightly oval from 9 to 3 o'clock, stoma is beefy red, moist, less edematous, firm, protruberant,   ? Mucocutaneous Junction (MCJ):  UTV today,  Intact with sutures  Peristomal skin: UTV today, was intact  Abdominal assessment: firm, distended, intact staple line  Output:  small, pasty brown/ green stool    I/O last 3 completed shifts:  In: 3390.4 [P.O.:840; I.V.:243.4]  Out: 2955 [Urine:2505; Stool:450]        Active Diet Order      Diet      Low Fiber Diet      Labs:    Recent Labs   Lab Test  03/13/18   0510  03/12/18   0530   ALBUMIN   --   2.4*   HGB  11.5*   --    INR   --   1.09   WBC  10.5   --            Intervention:     Patient's chart evaluated.      Assessments done today:  Stoma, pouch system changed, readiness to learn, supply needs reviewed    Education: demonstration on pouch change with pt and then pouch changed, discussion on skin care, product options from 1-2 pc and closure systems    Prepared for discharge by: supplies reviewed    Pt registered for ostomy supply samples? On Discharge- Coloplast         Assessment:     Stoma assessment: healthy, beefy red, no active bleeding noted, functioning well.     Pouch changed to Coloplast 1 pc with Velcro closure and ring.     Learning needs/ " comprehension: pt and spouse observed and assisted with pouch change yesterday, very engaged and feel ready for discharge.       Effectiveness of current pouching/ supply plan: TBD; prefers 1 pc Coloplast system         Plan:     Plan:   ? Current pouching system: Coloplast 1 pc pouch.  Supplies in room.   ?  Prepared for d/c - all questions answered, prepared Rx.  ? Do WOC Nurse recommend home care:  No, per spouse and pt. Spouse was present for extensive education prior to surgery and today and states that she feel ready to take the pt home. She understands that WOC is available as resource. Spouse will call insurance company to find appropriate DME.

## 2018-03-14 NOTE — PROGRESS NOTES
"CLINICAL NUTRITION SERVICES - REASSESSMENT NOTE + education    EVALUATION OF PROGRESS TOWARD GOALS   Diet: Low Fiber Diet (3/13)   Intake: Pt reports good tolerance of dinner yesterday after diet advancement. Ate about 60% of cod, cottage cheese, fruit, apple pie.   Notes that he feels like his \"stomach has shrunk\" so he doesn't eat as much as he once did. Requested Boost plus supplements yesterday when advanced to FLD.     Nutrition Support: TPN ran 3/7-3/14, currently weaning at 40 mL/hr per RN/CRS until bag is gone.   Tolerance:   - Labs  K/Mg/Phos - NL  BGs trending <150, 161 this am  TAG check 168 (H)  - I/O  NG removed 3/11, good tolerance  Ostoms output  3/13 - 450 mL  3/12 - 390 mL  3/11 - 50 mL  Weight Trending - down from admission. Possible 6.4 kg loss in the past 2 weeks (7%).   Date/time Weight   03/13/18 0507 84.6 kg (186 lb 9.6 oz)   03/12/18 0548 91 kg (200 lb 9.6 oz)   03/11/18 0559 86.1 kg (189 lb 12.8 oz)   03/09/18 0615 91.2 kg (201 lb)   03/08/18 0500 97.2 kg (214 lb 3.2 oz)   02/27/18 0208 91.2 kg (201 lb 1.6 oz)       Dosing Weight 79.5 kg - adjusted weight       ASSESSED NUTRITION NEEDS PER APPROVED PRACTICE GUIDELINES:  Estimated Energy Needs: 2453-1591 kcals (25-30 Kcal/Kg)  Justification: maintenance  Estimated Protein Needs:  grams protein (1.2-1.5 g pro/Kg)  Justification: post-op  Estimated Fluid Needs: 7410-2713 mL (1 mL/Kcal)  Justification: maintenance and per provider pending fluid status    NEW FINDINGS:   Plan d/c home w/ family today    Previous Goals:   Average PN intake to meet >/=90% goal total volume.  Evaluation: Met until wean this morning  Patient to meet >/=60% needs orally to justify d/c of PN.  Evaluation: Not met, though pt did eat 60% of one meal last night. PN weaning for d/c today.     Previous Nutrition Diagnosis:   Inadequate oral intake related to altered GI function post-op as evidenced by NPO/clears with negligible oral intakes (meeting <25% needs orally) " x 13 days since admission with PN reliance since 3/7, 3-day average intake indicating PN meeting 98% goal total volume.  Evaluation: Improving, updated below    MALNUTRITION (reassessed 3/14/2018)  % Weight Loss:> 5% in 1 month (severe malnutrition)  % Intake:  No decreased intake noted --> PN reliance  Subcutaneous Fat Loss:  None observed  Muscle Loss:  None observed  Fluid Retention:  None noted     Malnutrition Diagnosis: Patient does not meet two of the above criteria necessary for diagnosing malnutrition    CURRENT NUTRITION DIAGNOSIS  Inadequate oral intake related to previous inability for PO x2 weeks with slow advancement to Low Fiber diet r/t altered GI function as evidenced by FLD/Low Fiber diet now x 1 day with 60% intake of one solid meal.     INTERVENTIONS  Recommendations / Nutrition Prescription  Diet per CRS, agree with wean PN as pt now with functioning GIT and good tolerance PO    Supplements as ordered    Implementation  Collaboration and Referral of Nutrition care: Pt discussed during interdisciplinary rounds, TPN wean w/ RN.   Nutrition Education:     Assessed learning needs, learning preferences, and willingness to learn    Nutrition Education (Content):  a) Provided handout   a. Following a Low Fiber Diet  b) Discussed   a. Rationale and recommendations  b. Small meals, chew well, small bites  c. Avoid fresh fruit/veg/whole grains/legumes  d. Follow diet at least until first OP appointment    Nutrition Education (Application):  a) Discussed eating habits and recommended alternative food choices    Patient verbalizes understanding of diet     Anticipate good compliance    Diet Education - refer to Education Flowsheet    Goals  Pt to tolerate at least 50% of adequate meals TID + 1 supplement.       MONITORING AND EVALUATION:  Progress towards goals will be monitored and evaluated per protocol and Practice Guidelines      Anjali Peace RD, LD  3rd floor/ICU: [phone]  All other floors:  [phone]  Weekend/holiday: [phone]  Office: [phone]

## 2018-03-14 NOTE — PLAN OF CARE
Problem: Patient Care Overview  Goal: Plan of Care/Patient Progress Review  Orientation: A/O x4, calls as needed, again request least amount of interruptions through overnight as able.  VS stable, afebrile, denies pain    LS: clear and equal  GI: + Flatus + BM via ostomy. Denies N/V. BS active  : Adequate urine output using urinal at bedside  Diet: Low Fiber Diet and tolerating well.  PICC: double lumen = LR 10ml/hr, TPN 85ml/hr, Lipids 20.8ml/hr  Activity: Independent with mobility. Pt slept comfortably throughout shift.   BGs: 149 = 1 unit coverage  Disposition: Expected discharge TBD

## 2018-03-14 NOTE — PROGRESS NOTES
Pt to D/C to home with wife.  Pt provided with d/c instructions, including new medications, when medications were last given, and when to take them again.  Pt also informed to f/u with CRS in 3 weeks; to make appt with ALIA Luke for surveillance in 3 months; to establish PCP.  Pt verbalized understanding of all d/c and f/u instructions.  All questions were answered at this time.  Copy of paperwork sent with pt.  Medications (lovenox) x 1 sent with pt.  Wife to provide transport.  All personal belongings sent with pt.     Flu Vaccine: Recieved PTA

## 2018-03-15 ENCOUNTER — CARE COORDINATION (OUTPATIENT)
Dept: CARE COORDINATION | Facility: CLINIC | Age: 58
End: 2018-03-15

## 2018-03-15 NOTE — PROGRESS NOTES
Transition Communication Hand-off for Care Transitions to Next Level of Care Provider    Name: Gab Holloway  : 1960  MRN #: [id number]  Primary Care Provider: Bry Andrews     Primary Clinic: [address]  [facility] [address]     Reason for Hospitalization:  Intestinal obstruction, unspecified cause, unspecified whether partial or complete [K56.609]  Admit Date/Time: 2018 10:16 PM  Discharge Date: 3/14/18  Payor Source: Payor: PREFERREDONE / Plan: PREFERREDONE [employer] / Product Type: HMO /     Readmission Assessment Measure (MADY) Risk Score/category: ELEVATED         Reason for Communication Hand-off Referral: Multiple providers/specialties  Other prolonged hospital stay with new ostomy    Discharge Plan:       Concern for non-adherence with plan of care:   NO  Discharge Needs Assessment:  Needs       Most Recent Value    Equipment Currently Used at Home none    Transportation Available car, family or friend will provide    # of Referrals Placed by CTS Scheduled Follow-up appointments          Already enrolled in Tele-monitoring program and name of program:  na  Follow-up specialty is recommended: Yes    Follow-up plan:  No future appointments.    Any outstanding tests or procedures:             Follow-up and recommended labs and tests        Follow up in the [place] office on 3/21/18 with Dr. Delcid at 10:00am, please arrive 20 minutes early to complete paperwork. Call [phone] to reschedule your appointment as needed. Call the office at [phone] if you develop fever, uncontrolled pain, bleeding, nausea, vomiting, or constipation.     [place] Office:   [address]   [place] [address]                     Further instructions from your care team      1. [facility] Dr Luke -Arranged follow-up as an outpatient in 3 months for the 1st surveillance visit.     [address], [place] [address]   Phone: [phone]  [phone]        2. [facility]-Establish care with Dr Andrews for primary care.         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  MADY ELEVATED. New ostomy which he is managing well and didn't require home care WOC at [place].  He will need f/u with Dr Ti JAVIER in 3 months for surveillance.  He also will establish care with Dr Andrews for primary care.      Debbie Paulino    AVS/Discharge Summary is the source of truth; this is a helpful guide for improved communication of patient story

## 2018-03-15 NOTE — LETTER
Health Care Home - Access Care Plan    About Me  Patient Name:  Gab Holloway    YOB: 1960  Age:                             57 year old   Stacy MRN:            [id number] Telephone Information:     Home Phone [phone]   Mobile [phone]       Address:    [address] [facility] [address] Email address:  No e-mail address on record      Emergency Contact(s)  Name Relationship Lgl Grd Work Phone Home Phone Mobile Phone   1. LAZARUS HOLLOWAY Spouse  none [phone] none             Health Maintenance: Routine Health maintenance Reviewed: Due/Overdue   Health Maintenance Due   Topic Date Due     TETANUS IMMUNIZATION (SYSTEM ASSIGNED)  10/30/1977     HEPATITIS C SCREENING  10/30/1977     LIPID SCREEN Q5 YR MALE (SYSTEM ASSIGNED)  10/30/1994     COLON CANCER SCREEN (SYSTEM ASSIGNED)  10/30/2009     ADVANCE DIRECTIVE PLANNING Q5 YRS  10/30/2014       My Access Plan  Medical Emergency 911   Questions or concerns during clinic hours Primary Clinic Line, I will call the clinic directly: [facility] [phone]   24 Hour Appointment Line [phone] or  [phone]) (toll free)   24 Hour Nurse Line [phone] (toll free)   Questions or concerns outside clinic hours 24 Hour Appointment Line, I will call the after-hours on-call line:   [facility] [phone] or [phone]) (toll-free)   Preferred Urgent Care [facility] [phone]   Preferred Hospital [facility]  [phone]   Preferred Pharmacy No Pharmacies Listed   Behavioral Health Crisis Line The National Suicide Prevention Lifeline at 1-296.722.2953 or 918     My Care Team Members   Bry Andrews MD PCP - General Family Practice 3/6/18    Phone: [phone] Fax: [phone]         Feli Villa, RN Lead Care Coordinator Primary Care - [facility] 3/15/18    Phone: [phone] Fax: [phone]      Raj Luke,  MD [facility] 3/15/18    Phone: [phone] Fax: [phone]         Joann Delcid MD MD Colon and Rectal Surgery 3/15/18    Phone: [phone] Fax: [phone]          My Medical and Care Information  Problem List   Patient Active Problem List   Diagnosis     Colon obstruction     Colon cancer (H)      Current Medications and Allergies:  See printed Medication Report

## 2018-03-15 NOTE — DISCHARGE SUMMARY
[facility]  Discharge Summary        Gab Holloway MRN# [id number]   YOB: 1960 Age: 57 year old     Date of Admission:  2/26/2018  Date of Discharge:  3/14/2018  3:02 PM  Admitting Physician:  Sharonda Sales MD  Discharge Physician: Orville Crain MD  Discharging Service: Hospitalist     Primary Provider: Bry Lucero  Primary Care Physician Phone Number: [phone]         Discharge Diagnoses/Problem Oriented Hospital Course (Providers):    Gab Holloway was admitted on 2/26/2018 by Sharonda Sales MD and I would refer you to their history and physical.      Patient was seen and examined on the day of discharge    Discharge diagnoses:  1. 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 Acute hypoxic respiratory failure  3. Positive peritoneal wash culture, questionable significance  4. Asymptomatic cholelithiasis  5. Maculopapular back rash    Hospital course:  Gab Holloway is a 57 year old healthy male who was admitted on 2/26/2018 with abdominal pain/bloating. Found to have colonic mass with partial/early obstruction suspicious for malignancy. He had flex sig on 2/28 which showed partially obstructing mass- prelim pathology showed adenocarcinoma. He is s/p open colectomy w/ colostomy on 3/1/2018. Hem/onc consulted for planning of future treatment and discussion about prognosis.  It was felt that the patient only had stage III colonic adenocarcinoma and would not require adjuvant chemotherapy and will follow up in the oncology clinic for further staging.      Early 3/6 he developed increased productive cough and increased work of breathing.  Chest x-ray showed low lung volumes, and subsequent CT scan was negative for pulmonary embolism but revealed bilateral effusions with bibasilar infiltrates suggestive of pneumonia.  He has been started on IV Zosyn pending blood and sputum cultures.  He did have NG tube  replaced as well with large output which also seemed to make him feel better.  He now has been weaned to room air.  His diet was able to be advanced.    Disposition: Discharged to home in stable condition.  He deferred on further home PT/OT.           Pending Results:        Unresulted Labs Ordered in the Past 30 Days of this Admission     No orders found from 12/28/2017 to 2/27/2018.            Discharge Instructions and Follow-Up:      Follow-up Appointments     Follow-up and recommended labs and tests        Follow up in the [place] office on 3/21/18 with Dr. Delcid at 10:00am,   please arrive 20 minutes early to complete paperwork. Call [phone] to   reschedule your appointment as needed. Call the office at [phone] if   you develop fever, uncontrolled pain, bleeding, nausea, vomiting, or   constipation.    [place] Office:   [address]  [place] [address]                      Discharge Disposition:      Discharged to home         Discharge Medications:        Discharge Medication List as of 3/14/2018  2:32 PM      START taking these medications    Details   enoxaparin (LOVENOX) 40 MG/0.4ML injection Inject 0.4 mLs (40 mg) Subcutaneous every 24 hours, Disp-17 Syringe, R-0, E-Prescribe         STOP taking these medications       UNABLE TO FIND Comments:   Reason for Stopping:                 Allergies:       No Known Allergies        Consultations This Hospital Stay:      Consultation during this admission received from colorectal surgery           Image Results From This Hospital Stay (For Non-EPIC Providers):        Results for orders placed or performed during the hospital encounter of 02/26/18   Abd/pelvis CT no contrast - [place] Protocol    Narrative    CT ABDOMEN PELVIS W/O CONTRAST  2/26/2018 11:59 PM     INDICATION: Intermittent lower abdominal pain.      TECHNIQUE: Thin axial images through the abdomen and pelvis without  contrast. Coronal reformatted images. Radiation dose  for this scan was  reduced using automated exposure control, adjustment of the mA and/or  kV according to patient size, or iterative reconstruction technique.    COMPARISON: None.    FINDINGS: No urinary stones or hydronephrosis. Cholelithiasis. No  pericholecystic inflammation. The upper abdominal organs are otherwise  negative without contrast.    There is a short segment of focal narrowing and bowel wall thickening  involving the upper descending colon over a length of approximately 4  cm. The colon proximal to this is mildly distended with gas and stool.  The colon below this is decompressed making it difficult to accurately  assess the bowel wall thickness. There are a few tiny lymph nodes in  the mesenteric fat adjacent to the thickened colonic segment. No small  bowel dilatation.    Pelvic structures within normal limits. No ascites or free air. Mild  degenerative changes in the visualized spine.      Impression    IMPRESSION:  1. Short segment of focal colonic wall thickening with luminal  narrowing in the upper descending colon.  2. Mild associated distention of the colon proximal to this consistent  with at least partial/early associated mechanical obstruction.  3. This is most likely neoplasm. Differential includes focal colitis  and/or stricture.  4. Cholelithiasis.    Findings discussed with the ER physician at 12:15 a.m.    AYALA BERNABE MD   XR Colon Water Soluble    Narrative    COLON WATER SOLUBLE February 27, 2018 11:17 AM     HISTORY: Colonic obstruction concerning for malignancy.    FLUOROSCOPY TIME: .4 minutes.    NUMBER OF IMAGES ACQUIRED: Nine.    VIEWS: Three.    FINDINGS: There is an apple-core type lesion at the proximal aspect of  the descending colon. Only a small amount of contrast could be  advanced proximal to this lesion due to the high-grade obstruction and  patient discomfort. There are some scattered stool distal to the  lesion.      Impression    IMPRESSION: Apple-core lesion  of the proximal descending colon, highly  suggestive of carcinoma.    LEE PALAFOX MD   CT Chest/Abdomen/Pelvis w Contrast    Narrative    CT CHEST, ABDOMEN, AND PELVIS WITH CONTRAST  2/27/2018 7:06 PM    HISTORY: Upper descending colon mass, suggestive of neoplasm.     COMPARISON: A CT of the abdomen and pelvis without contrast on  2/26/2018.    TECHNIQUE: Routine transverse CT imaging of the chest, abdomen, and  pelvis was performed following the uneventful administration of 100mL  Isovue-370 intravenous contrast. Radiation dose for this scan was  reduced using automated exposure control, adjustment of the mA and/or  kV according to patient size, or iterative reconstruction technique.    FINDINGS:   Chest: The heart size is normal. No enlarged lymph node or other  abnormal mediastinal mass is seen. The thoracic aorta and pulmonary  arteries are unremarkable. The lungs are clear. No pneumothorax is  demonstrated. No pleural effusion is identified. There are  degenerative changes in the spine. No other osseous abnormality is  demonstrated. No chest wall pathology is seen.     Abdomen and pelvis: The liver, spleen, and pancreas are normal. Again  seen are several small dependent gallstones. The adrenal glands,  kidneys, and bladder are normal. No enlarged lymph node or other  abnormal mass is demonstrated. No free fluid is seen. No free  intraperitoneal gas is identified. Again seen is a short segment of  prominent narrowing and bowel wall thickening along the superior  aspect of the descending colon. This area measures approximately 4.0  cm in length. There is moderate distention of the colon proximal to  this. No other gastrointestinal tract abnormality is identified. The  appendix is not identified. There is no additional evidence of  appendicitis. No vascular abnormality is seen. There are degenerative  changes in the spine. No other osseous abnormality is seen. No  abdominal or pelvic wall pathology is  demonstrated.       Impression    IMPRESSION:   1. Again seen is a short segment of prominent bowel wall thickening  and luminal narrowing within the superior aspect of the descending  colon. This is again highly suggestive of a neoplasm.  2. Cholelithiasis.  3. Otherwise unremarkable examination. Specifically, I see no evidence  of metastatic disease.     MELISSA YPLE MD   XR Chest Port 1 View    Narrative    CHEST PORTABLE ONE VIEW March 6, 2018 8:28 AM    HISTORY: Respiratory distress.     COMPARISON: None.      Impression    IMPRESSION: Shallow inspiration. Pulmonary vasculature felt to be  upper limits of normal by portable technique. Otherwise negative.     JOÃO HAMILTON MD   CT Chest Pulmonary Embolism w Contrast    Narrative    CT CHEST PULMONARY EMBOLISM WITH CONTRAST  3/6/2018 12:40 PM     HISTORY: Status post colectomy for colon cancer, now with hypoxia and  increased respiratory rate.  Question PE vs new pneumonia.     TECHNIQUE: Thin section axial images are performed from the thoracic  inlet to the lung bases utilizing 78 mL of Isovue 370 IV contrast  without adverse event. Coronal reformatted images are also generated.  Radiation dose for this scan was reduced using automated exposure  control, adjustment of the mA and/or kV according to patient size, or  iterative reconstruction technique.    FINDINGS:  Respiratory motion artifact is present on the exam.  Bibasilar lung consolidation is present with small bilateral pleural  effusions. Findings could indicate pneumonia. Fluid is present in the  distal esophagus, possibly indicating reflux. No esophageal wall  thickening or obstruction is evident. No enlarged lymph nodes. No  evidence of pulmonary embolism. Thoracic aorta is unremarkable.  Limited images upper abdomen demonstrate calcified gallstones and  probable fatty liver infiltration. Gallbladder is otherwise  unremarkable. Imaged portions of the upper abdominal organs are  otherwise  within normal limits. Postsurgical changes are noted in the  upper abdomen consistent with recent colectomy.      Impression    IMPRESSION:  1. No evidence of pulmonary embolism. Thoracic aorta is unremarkable.  2. Bibasilar lung consolidation concerning for pneumonia. Small  bilateral pleural effusions are also evident.  3. Cholelithiasis and probable fatty infiltration of the liver.  4. Postop changes upper abdomen from recent colectomy.  5. Small amount of fluid in the distal esophagus, possibly due to  reflux. No esophageal wall thickening.    NELSON HICKS MD   XR Abdomen 1 View    Narrative    ABDOMEN ONE VIEW March 6, 2018 12:18 PM    HISTORY: Abdominal distension, status post recent colectomy for colon  cancer.     COMPARISON: CT scan 2/27/2018.      Impression    IMPRESSION: Surgical staples project over the midline abdomen and  pelvis. Air-filled loops of small bowel and colon in a nonspecific  pattern, possibly mild ileus.     JOÃO HAMILTON MD   CT Abdomen Pelvis w/o & w Contrast    Narrative    CT ABDOMEN AND PELVIS WITHOUT AND WITH CONTRAST   3/9/2018 3:33 PM     HISTORY: Post abdominal surgery, question ileus vs bowel obstruction.     TECHNIQUE:  CT abdomen and pelvis with 100 mL Isovue-370 IV. Radiation  dose for this scan was reduced using automated exposure control,  adjustment of the mA and/or kV according to patient size, or iterative  reconstruction technique.    COMPARISON: CT chest, abdomen, and pelvis 2/27/2018. CT chest  3/6/2018.    FINDINGS: Again seen are bibasilar moderate pleural effusions,  slightly larger in volume. Bilateral lower lobe prominent atelectasis  an/or airspace consolidation again identified.    There is a nasogastric tube in place within the gastric lumen. There  is a right abdominal ostomy. There are several moderately dilated  proximal to mid small bowel loops identified. There is no abrupt  transition point to decompressed distal small bowel leading to the  right colon.  There is wall thickening of the remaining proximal colon  leading to the ostomy site. There is some small pericolic gutter fluid  with mild peripheral enhancement, for example at the left flank series  2 image 33. Scattered areas of mild edema of the fat in the abdomen  noted.    Cholelithiasis. Liver, adrenals, spleen, pancreas, and kidneys show no  acute abnormalities. No acute aortic abnormality.      Impression    IMPRESSION:  1. Several moderately dilated small bowel loops identified. There is  no abrupt transition to decompression identified. Rather, there is  some gentle decompression at the distal small bowel. As such, this  could represent ileus.  2. Bilateral pericolic gutter small layering fluid with some mild  peripheral enhancement. Correlate with any evidence of peritonitis.  3. Moderate bibasilar pleural effusions appears slightly larger.  Bilateral lower lobe prominent atelectasis and/or airspace disease  again noted.  4. Cholelithiasis.    TU CRONIN MD

## 2018-03-15 NOTE — LETTER
[place] CARE COORDINATION  [facility]   [address] [place] [address]    March 15, 2018    Gab Holloway  [address] [address] [address]      Dear Gab,    I am a clinic care coordinator who works with Bry Andrews MD at [facility] I wanted to introduce myself and provide you with my contact information for you to be able to call me with any questions or concerns. I wanted to thank you for spending the time to talk with me.  I wanted to introduce myself and provide you with my contact information so that you can call me with questions or concerns about your health care. Below is a description of clinic care coordination and how I can further assist you.     The clinic care coordinator is a registered nurse and/or  who understand the health care system. The goal of clinic care coordination is to help you manage your health and improve access to the [facility] system in the most efficient manner. The registered nurse can assist you in meeting your health care goals by providing education, coordinating services, and strengthening the communication among your providers. The  can assist you with financial, behavioral, psychosocial, chemical dependency, counseling, and/or psychiatric resources.    Please feel free to contact me at [phone], with any questions or concerns. We at [facility] are focused on providing you with the highest-quality healthcare experience possible and that all starts with you.     Sincerely,   Feli Villa Care Coordinator RN  [place] [phone]  March 15, 2018     Enclosed: I have enclosed a copy of a 24 Hour Access Plan. This has helpful phone numbers for you to call when needed. Please keep this in an easy to access place to use as needed.

## 2018-03-15 NOTE — PROGRESS NOTES
Clinic Care Coordination Contact  OUTREACH    Referral Information:  Referral Source: IP Handoff  Reason for Contact: post hospital follow up   Care Conference: No     Universal Utilization:   ED Visits in last year: 0  Hospital visits in last year: 1  Last PCP appointment:  (HAS NOT ESTABLISHED CARE WITH [facility] YET)  Missed Appointments: 0  Concerns: HAS NOT YET EST. CARE   Multiple Providers or Specialists: YES     Clinical Concerns:  Current Medical Concerns: patient admitted from 2/26 - 3/14 colon obstruction d/t colon cancer   Feeling well since coming home - slept well last night in his own bed   Discussed discharge instructions below     Discharge instructions include:   Low Fiber Diet - compliant   Activity walking 30 mins per day - has not started this yet however he is moving about a lot in his home without problems   No Heavy lifting over 10-15 lbs x 6 weeks - Reminded of this - has not had to lift anything yet   F/u with Dr. Hernandez 321 surgeon - will call today to see if this appt. Is still scheduled - he has the phone number to call   F/u with Dr. Luke 3 months - oncology - reviewed information     Patient is doing well with the ostomy and has no questions/concerns at this time   Patient is doing well with the lovenox injection - his wife just gave this to him a little bit ago - they have no questions at this time     Tried to assist patient with appt. In clinic with Dr. Andrews - he prefers not to schedule at this time - he plans on following up with specialists at this time - and will call back at a later time to establish care. He has not had a family doctor prior to this. Patient states he really just did not go to the doctor and did not need to.     Current Behavioral Concerns: No concerns noted - at risk for anxiety/depression with oncology diagnosis       Clinical Pathway: None    Medication Management:  States compliance - no concerns with lovenox injections, wife is administering       Functional Status:  Mobility Status: Independent  Equipment Currently Used at Home: colostomy/ostomy supplies  Transportation: Drives   Works as a  - he will be out for atleast the next 6 weeks and will revisit this with specialist at visits   Wife is home with him every day to assist if needed      Psychosocial:  Current living arrangement:  I live in a private home with spouse (MARCO ANTONIO)  Financial/Insurance: No concerns noted      Resources and Interventions:  Current Resources:  (NA);  (NA)  PAS Number:  (NA)  Senior Linkage Line Referral Placed:  (NA)  Advanced Care Plans/Directives on file:: No  Referrals Placed:  (NA)     Patient/Caregiver understanding: yes   Frequency of Care Coordination: 1 WEEK         Plan:   Patient will call surgeon to ensure appt. Date time is still set as he believes that this was made when he first admitted   Patient was given CCRN contact number for questions/concerns   CCRN gave patient instructions how to utilize the  nurse advisors 24 hour nurseline if needed for questions/concerns   Discussed urgent care hours at the [facility] location   CCRN will mail [facility] care plan and care coordination intro letter   CCRN will f/u with patient in 1 week - he agrees to this outreach     Feli Villa Care Coordinator RN  [facility]  [phone]  March 15, 2018

## 2018-06-26 ENCOUNTER — PATIENT OUTREACH (OUTPATIENT)
Dept: CARE COORDINATION | Facility: CLINIC | Age: 58
End: 2018-06-26

## 2018-06-26 NOTE — PROGRESS NOTES
Clinic Care Coordination Contact    Situation: Patient chart reviewed by RN care coordinator.    Background: Colon Cancer / colectomy  CCRN had reached out to patient after hospitalization in March 2018 - at that time patient had not established care with  clinics but was planning to     Assessment: Per chart review patient has not established care with  clinics -    Plan/Recommendations: closing care coordination at this time - please refer in the future if needs arise     Feli Villa Care Coordinator RN  [facility]  [phone]  June 26, 2018

## 2018-07-02 ENCOUNTER — NURSE TRIAGE (OUTPATIENT)
Dept: NURSING | Facility: CLINIC | Age: 58
End: 2018-07-02

## 2018-07-02 ENCOUNTER — OFFICE VISIT (OUTPATIENT)
Dept: URGENT CARE | Facility: URGENT CARE | Age: 58
End: 2018-07-02
Payer: COMMERCIAL

## 2018-07-02 VITALS
DIASTOLIC BLOOD PRESSURE: 80 MMHG | HEART RATE: 99 BPM | SYSTOLIC BLOOD PRESSURE: 110 MMHG | OXYGEN SATURATION: 98 % | TEMPERATURE: 99.4 F

## 2018-07-02 DIAGNOSIS — G56.02 CARPAL TUNNEL SYNDROME OF LEFT WRIST: Primary | ICD-10-CM

## 2018-07-02 PROCEDURE — 99203 OFFICE O/P NEW LOW 30 MIN: CPT | Performed by: FAMILY MEDICINE

## 2018-07-02 NOTE — TELEPHONE ENCOUNTER
"Patient calling reporting numbness and tingling starting in left hand on Tuesday 6/26/18. Patient reporting he has full strength in hand. Index finger and palm of hand numb and tingling. Reporting last evening \"severe pain\" in hand that has resolved.   Per guidelines advised to be seen with in 24 hours. Caller verbalized understanding. Denies further questions.    Joann Real RN  [facility]        "

## 2018-07-02 NOTE — PROGRESS NOTES
SUBJECTIVE:  Chief Complaint   Patient presents with     Urgent Care     Musculoskeletal Problem     Numbness and burning sensation on L thumb goes up to L index finger, ache on upper arm. Feeling a sensation when pushing muscle under L arm. Sx x6 days. Hx of L pinky finger contration      Gab Holloway is a 57 year old male who developed  left hand thumb, index and palm pain and burning developed and worsening since  6 days ago. Mechanism of injury:  Unsure,  Though he has changed the work in the past week, he has had to hold a metal part covered with an oily coolant in his left hand, with contact with the thumb, index and palm.  Has had pain when awaking from sleep. Immediate symptoms: insidious onset of pain, no deformity was noted by the patient. Symptoms have been gradual, worsening since that time.  He was doing the same movement at work the past 2 days and symptoms were the worst last night. Prior history of related problems: no prior problems with this area in the past.  He says he has worked with this coolant in the past with no similar symptoms.    In the coming week he will be off of work and be able to rest the area.     5th finger of left hand has chronic contracture    No past medical history on file.  Patient Active Problem List   Diagnosis     Colon obstruction     Colon cancer (H)        ALLERGIES:  Review of patient's allergies indicates no known allergies.        Current Outpatient Prescriptions on File Prior to Visit:  enoxaparin (LOVENOX) 40 MG/0.4ML injection Inject 0.4 mLs (40 mg) Subcutaneous every 24 hours (Patient not taking: Reported on 7/2/2018)     No current facility-administered medications on file prior to visit.     Social History   Substance Use Topics     Smoking status: Never Smoker     Smokeless tobacco: Never Used     Alcohol use Yes      Comment: weekends       No family history on file.      ROS:  CONSTITUTIONAL:NEGATIVE for fever, chills,    EYES: NEGATIVE for vision  changes or irritation  ENT/MOUTH: NEGATIVE for ear, mouth and throat problems  RESP:NEGATIVE for significant cough or SOB    OBJECTIVE:  Vital signs: /80 (BP Location: Right arm, Patient Position: Chair, Cuff Size: Adult Regular)  Pulse 99  Temp 99.4  F (37.4  C)  SpO2 98%  Appearance: in no apparent distress and cooperative.  No pain, contusion, swelling or limited ROM of the left elbow, shoulder, wrist    left Hand exam:  sensation intact all fingers-  Though he reports tingling of the tip of the thumb and the index  ROM intact   Fingers 1-4, though pain in the region of the  Index/ thumb with ROM of the   finger(s)  Capillary refill 2 seconds all fingers  Swelling and contusion of the region  - none visible of hand/ fingers  no Injury to the skin of the hand     No pain with resisted movement of the fingers or the wrist  No pain with hyperextension or hyperflexion of the wrist        ASSESSMENT:  Carpal tunnel syndrome of left wrist     - order for DME; Left hand thumb spica splint    warm packs to the painful region to encourage blood flow     Ibuprofen/ acetaminophen as and alternative for pain  Splint provided  The rest from repetitive motion should also give opportunity to heal  Discussed that if the coolant is too cool he could have cold injury to the nerves of the fingers/ palm with repeated exposure-  He has been trying to remove the coolant    Follow-up with primary care or ortho hand if persistent symptoms-   If persistent pain symptoms of the region of the radial nerve he may need neurology to perform nerve conduction testing

## 2018-07-02 NOTE — PATIENT INSTRUCTIONS
Carpal Tunnel Syndrome    Carpal tunnel syndrome is a painful condition of the wrist and arm. It is caused by pressure on the median nerve.  The median nerve is one of the nerves that give feeling and movement to the hand. It passes through a tunnel in the wrist called the carpal tunnel. This tunnel is made up of bones and ligaments. Narrowing of this tunnel or swelling of the tissues inside the tunnel puts pressure on the median nerve. This causes numbness, pins and needles, or electric shooting pains in your hand and forearm. Often the pain is worse at night and may wake you when you are asleep.  Carpal tunnel syndrome may occur during pregnancy and with use of birth control pills. It is more common in workers who must often bend their wrists. It is also common in people who work with power tools that cause strong vibrations.  Home care    Rest the painful wrist. Avoid repeated bending of the wrist back and forth. This puts pressure on the median nerve. Avoid using power tools with strong vibrations.    If you were given a splint, wear it at night while you sleep. You may also wear it during the day for comfort.    Move your fingers and wrists often to avoid stiffness.    Elevate your arms on pillows when you lie down.    Try using the unaffected hand more.    Try not to hold your wrists in a bent, downward position.    Sometimes changes in the work place may ease symptoms. If you type most of the day, it may help to change the position of your keyboard or add a wrist support. Your wrist should be in a neutral position and not bent back when typing.    You may use over-the-counter pain medicine to treat pain and inflammation, unless another medicine was prescribed. Anti-inflammatory pain medicines, such as ibuprofen or naproxen may be more effective than acetaminophen, which treats pain, but not inflammation. If you have chronic liver or kidney disease or ever had a stomach ulcer or GI bleeding, talk with your  doctor before using these medicines.    Opioid pain medicine will only give temporary relief and does not treat the problem. If pain continues, you may need a shot of a steroid drug into your wrist.    If the above methods fail, you may need surgery. This will open the carpal tunnel and release the pressure on the trapped nerve.  Follow-up care  Follow up with your healthcare provider, or as advised, if the pain doesn t begin to improve within the next week.  If X-rays were taken, you will be notified of any new findings that may affect your care.  When to seek medical advice  Call your healthcare provider right away if any of these occur:    Pain not improving with the above treatment    Fingers or hand become cold, blue, numb, or tingly    Your whole arm becomes swollen or weak  Date Last Reviewed: 11/23/2015 2000-2017 [employer]. [address] [place] [address], [place] [address]. All rights reserved. This information is not intended as a substitute for professional medical care. Always follow your healthcare professional's instructions.

## 2018-07-02 NOTE — MR AVS SNAPSHOT
After Visit Summary   7/2/2018    Gab Holloway    MRN: [id number]           Patient Information     Date Of Birth          1960        Visit Information        Provider Department      7/2/2018 2:35 PM Debbie Berrios MD [facility] URGENT [facility]ARE        Today's Diagnoses     Carpal tunnel syndrome of left wrist    -  1      Care Instructions      Carpal Tunnel Syndrome    Carpal tunnel syndrome is a painful condition of the wrist and arm. It is caused by pressure on the median nerve.  The median nerve is one of the nerves that give feeling and movement to the hand. It passes through a tunnel in the wrist called the carpal tunnel. This tunnel is made up of bones and ligaments. Narrowing of this tunnel or swelling of the tissues inside the tunnel puts pressure on the median nerve. This causes numbness, pins and needles, or electric shooting pains in your hand and forearm. Often the pain is worse at night and may wake you when you are asleep.  Carpal tunnel syndrome may occur during pregnancy and with use of birth control pills. It is more common in workers who must often bend their wrists. It is also common in people who work with power tools that cause strong vibrations.  Home care    Rest the painful wrist. Avoid repeated bending of the wrist back and forth. This puts pressure on the median nerve. Avoid using power tools with strong vibrations.    If you were given a splint, wear it at night while you sleep. You may also wear it during the day for comfort.    Move your fingers and wrists often to avoid stiffness.    Elevate your arms on pillows when you lie down.    Try using the unaffected hand more.    Try not to hold your wrists in a bent, downward position.    Sometimes changes in the work place may ease symptoms. If you type most of the day, it may help to change the position of your keyboard or add a wrist support. Your wrist should be in a neutral position and not bent back  when typing.    You may use over-the-counter pain medicine to treat pain and inflammation, unless another medicine was prescribed. Anti-inflammatory pain medicines, such as ibuprofen or naproxen may be more effective than acetaminophen, which treats pain, but not inflammation. If you have chronic liver or kidney disease or ever had a stomach ulcer or GI bleeding, talk with your doctor before using these medicines.    Opioid pain medicine will only give temporary relief and does not treat the problem. If pain continues, you may need a shot of a steroid drug into your wrist.    If the above methods fail, you may need surgery. This will open the carpal tunnel and release the pressure on the trapped nerve.  Follow-up care  Follow up with your healthcare provider, or as advised, if the pain doesn t begin to improve within the next week.  If X-rays were taken, you will be notified of any new findings that may affect your care.  When to seek medical advice  Call your healthcare provider right away if any of these occur:    Pain not improving with the above treatment    Fingers or hand become cold, blue, numb, or tingly    Your whole arm becomes swollen or weak  Date Last Reviewed: 11/23/2015 2000-2017 [employer]. [address] [place] [address]. All rights reserved. This information is not intended as a substitute for professional medical care. Always follow your healthcare professional's instructions.                Follow-ups after your visit        Your next 10 appointments already scheduled     Aug 27, 2018 10:30 AM CDT   CT ABDOMEN PELVIS W CONTRAST with [facility] [facility] ([facility])    [id number] [address] [address] [phone]           Please bring any scans or X-rays taken at other hospitals, if similar tests were done. Also bring a list of your medicines, including vitamins, minerals and over-the-counter  drugs. It is safest to leave personal items at home.  Be sure to tell your doctor:   If you have any allergies.   If there s any chance you are pregnant.   If you are breastfeeding.  How to prepare:   Do not eat or drink for 2 hours before your exam. If you need to take medicine, you may take it with small sips of water. (We may ask you to take liquid medicine as well.)   Please wear loose clothing, such as a sweat suit or jogging clothes. Avoid snaps, zippers and other metal. We may ask you to undress and put on a hospital gown.  Please arrive 30 minutes early for your CT. Once in the department you might be asked to drink water 15-20 minutes prior to your exam.  If indicated you may be asked to drink an oral contrast in advance of your CT.  If this is the case, the imaging team will let you know or be in contact with you prior to your appointment  Patients over 70 or patients with diabetes or kidney problems:   If you haven t had a blood test (creatinine test) within the last 30 days, the Cardiologist/Radiologist may require you to get this test prior to your exam.  If you have diabetes:   Continue to take your metformin medication on the day of your exam  If you have any questions, please call the Imaging Department where you will have your exam.              Who to contact     If you have questions or need follow up information about today's clinic visit or your schedule please contact [facility] directly at [phone].  Normal or non-critical lab and imaging results will be communicated to you by MyChart, letter or phone within 4 business days after the clinic has received the results. If you do not hear from us within 7 days, please contact the clinic through MyChart or phone. If you have a critical or abnormal lab result, we will notify you by phone as soon as possible.  Submit refill requests through [facility] or call your pharmacy and they will forward the refill request to us. Please  "allow 3 business days for your refill to be completed.          Additional Information About Your Visit        MyChart Information     [employer] lets you send messages to your doctor, view your test results, renew your prescriptions, schedule appointments and more. To sign up, go to [email or web address] . Click on \"Log in\" on the left side of the screen, which will take you to the Welcome page. Then click on \"Sign up Now\" on the right side of the page.     You will be asked to enter the access code listed below, as well as some personal information. Please follow the directions to create your username and password.     Your access code is: [email or web address]  Expires: 2018  2:52 PM     Your access code will  in 90 days. If you need help or a new code, please call your [facility] clinic or [phone].        Care EveryWhere ID     This is your Care EveryWhere ID. This could be used by other organizations to access your [facility] medical records  [id number]        Your Vitals Were     Pulse Temperature Pulse Oximetry             99 99.4  F (37.4  C) 98%          Blood Pressure from Last 3 Encounters:   18 110/80   18 111/63    Weight from Last 3 Encounters:   18 186 lb 9.6 oz (84.6 kg)              Today, you had the following     No orders found for display         Today's Medication Changes          These changes are accurate as of 18  2:52 PM.  If you have any questions, ask your nurse or doctor.               Start taking these medicines.        Dose/Directions    order for DME   Used for:  Carpal tunnel syndrome of left wrist   Started by:  Debbie Berrios MD        Left hand thumb spica splint   Quantity:  1 Units   Refills:  0            Where to get your medicines      Some of these will need a paper prescription and others can be bought over the counter.  Ask your nurse if you have questions.     Bring a paper prescription for each of these medications     order for DME       "          Primary Care Provider Office Phone # Fax #    Bry Andrews [phone] [address]  [facility] [address]        Equal Access to Services     LEONANAND RUBEN : Hadii aad ku hadraho Soomaali, waaxda luqadaha, qaybta kaalmada adeegyada, mitra silva ladallasjakob brent. So [facility] [phone].    ATENCIÓN: Si habla español, tiene a hannon disposición servicios gratuitos de asistencia lingüística. Llame al [phone].    We comply with applicable federal civil rights laws and [place] laws. We do not discriminate on the basis of race, color, national origin, age, disability, sex, sexual orientation, or gender identity.            Thank you!     Thank you for choosing [facility]  for your care. Our goal is always to provide you with excellent care. Hearing back from our patients is one way we can continue to improve our services. Please take a few minutes to complete the written survey that you may receive in the mail after your visit with us. Thank you!             Your Updated Medication List - Protect others around you: Learn how to safely use, store and throw away your medicines at www.disposemymeds.org.          This list is accurate as of 7/2/18  2:52 PM.  Always use your most recent med list.                   Brand Name Dispense Instructions for use Diagnosis    enoxaparin 40 MG/0.4ML injection    LOVENOX    17 Syringe    Inject 0.4 mLs (40 mg) Subcutaneous every 24 hours    Intestinal obstruction, unspecified cause, unspecified whether partial or complete       order for DME     1 Units    Left hand thumb spica splint    Carpal tunnel syndrome of left wrist

## 2018-07-02 NOTE — TELEPHONE ENCOUNTER
"  Reason for Disposition    Numbness (i.e., loss of sensation) in hand or fingers (Exception: just tingling; numbness present > 2 weeks)    Additional Information    Negative: [1] Similar pain previously AND [2] it was from \"heart attack\"    Negative: [1] Similar pain previously AND [2] it was from \"angina\" AND [3] not relieved by nitroglycerin    Negative: Sounds like a life-threatening emergency to the triager    Negative: [1] SEVERE weakness (i.e., unable to walk or barely able to walk, requires support) AND [2] new onset or worsening    Negative: [1] Weakness (i.e., paralysis, loss of muscle strength) of the face, arm / hand, or leg / foot on one side of the body AND [2] sudden onset AND [3] present now    Negative: [1] Numbness (i.e., loss of sensation) of the face, arm / hand, or leg / foot on one side of the body AND [2] sudden onset AND [3] present now    Negative: [1] Loss of speech or garbled speech AND [2] sudden onset AND [3] present now    Negative: Difficult to awaken or acting confused  (e.g., disoriented, slurred speech)    Negative: Sounds like a life-threatening emergency to the triager    Negative: Confusion, disorientation, or hallucinations is main symptom    Negative: Neck pain is main symptom (and having weakness, numbness, or tingling in arm / hand because of neck pain)    Negative: Back pain is main symptom (and having weakness, numbness, or tingling in leg because of back pain)    Hand pain is main symptom (and having mild weakness, numbness, or tingling in hand related to hand pain)    Negative: Followed a hand or wrist injury    Negative: Chest pain    Negative: Caused by an animal bite    Negative: Caused by a human bite    Negative: Wound looks infected    Negative: Finger pain is main symptom    Negative: Arm pain is main symptom    Negative: [1] Swollen joint AND [2] fever    Negative: [1] Red area or streak AND [2] fever    Negative: Patient sounds very sick or weak to the triager    " Negative: [1] SEVERE pain (e.g., excruciating, unable to use hand at all) AND [2] not improved after 2 hours of pain medicine    Negative: [1] Looks infected (spreading redness, pus) AND [2] large red area (> 2 in. or 5 cm)    Negative: Weakness (i.e., loss of strength) of new onset in hand or fingers  (Exceptions: not truly weak, hand feels weak because of pain; weakness present > 2 weeks)    Protocols used: HAND AND WRIST PAIN-ADULT-, NEUROLOGIC DEFICIT-ADULT-

## 2018-08-27 ENCOUNTER — HOSPITAL ENCOUNTER (OUTPATIENT)
Dept: CT IMAGING | Facility: CLINIC | Age: 58
Discharge: HOME | End: 2018-08-27
Attending: INTERNAL MEDICINE | Admitting: INTERNAL MEDICINE
Payer: COMMERCIAL

## 2018-08-27 DIAGNOSIS — C18.9 COLON CANCER (H): ICD-10-CM

## 2018-08-27 PROCEDURE — 74177 CT ABD & PELVIS W/CONTRAST: CPT

## 2018-08-27 PROCEDURE — 25000128 H RX IP 250 OP 636: Performed by: RADIOLOGY

## 2018-08-27 RX ORDER — IOPAMIDOL 755 MG/ML
500 INJECTION, SOLUTION INTRAVASCULAR ONCE
Status: COMPLETED | OUTPATIENT
Start: 2018-08-27 | End: 2018-08-27

## 2018-08-27 RX ADMIN — SODIUM CHLORIDE 53 ML: 900 INJECTION, SOLUTION INTRAVENOUS at 10:38

## 2018-08-27 RX ADMIN — IOPAMIDOL 93 ML: 755 INJECTION, SOLUTION INTRAVENOUS at 10:38

## 2018-12-04 ENCOUNTER — HOSPITAL ENCOUNTER (OUTPATIENT)
Facility: CLINIC | Age: 58
Discharge: HOME | End: 2018-12-04
Attending: COLON & RECTAL SURGERY | Admitting: COLON & RECTAL SURGERY
Payer: COMMERCIAL

## 2018-12-04 VITALS
RESPIRATION RATE: 19 BRPM | OXYGEN SATURATION: 97 % | HEIGHT: 68 IN | SYSTOLIC BLOOD PRESSURE: 114 MMHG | WEIGHT: 196 LBS | BODY MASS INDEX: 29.7 KG/M2 | DIASTOLIC BLOOD PRESSURE: 74 MMHG

## 2018-12-04 LAB — COLONOSCOPY: NORMAL

## 2018-12-04 PROCEDURE — 99153 MOD SED SAME PHYS/QHP EA: CPT | Performed by: COLON & RECTAL SURGERY

## 2018-12-04 PROCEDURE — 45385 COLONOSCOPY W/LESION REMOVAL: CPT | Mod: PT | Performed by: COLON & RECTAL SURGERY

## 2018-12-04 PROCEDURE — 25000128 H RX IP 250 OP 636: Performed by: COLON & RECTAL SURGERY

## 2018-12-04 PROCEDURE — 88305 TISSUE EXAM BY PATHOLOGIST: CPT | Mod: 26 | Performed by: COLON & RECTAL SURGERY

## 2018-12-04 PROCEDURE — G0500 MOD SEDAT ENDO SERVICE >5YRS: HCPCS | Performed by: COLON & RECTAL SURGERY

## 2018-12-04 PROCEDURE — 88305 TISSUE EXAM BY PATHOLOGIST: CPT | Performed by: COLON & RECTAL SURGERY

## 2018-12-04 RX ORDER — ONDANSETRON 2 MG/ML
4 INJECTION INTRAMUSCULAR; INTRAVENOUS EVERY 6 HOURS PRN
Status: DISCONTINUED | OUTPATIENT
Start: 2018-12-04 | End: 2018-12-04 | Stop reason: HOSPADM

## 2018-12-04 RX ORDER — LIDOCAINE 40 MG/G
CREAM TOPICAL
Status: DISCONTINUED | OUTPATIENT
Start: 2018-12-04 | End: 2018-12-04 | Stop reason: HOSPADM

## 2018-12-04 RX ORDER — NALOXONE HYDROCHLORIDE 0.4 MG/ML
.1-.4 INJECTION, SOLUTION INTRAMUSCULAR; INTRAVENOUS; SUBCUTANEOUS
Status: DISCONTINUED | OUTPATIENT
Start: 2018-12-04 | End: 2018-12-04 | Stop reason: HOSPADM

## 2018-12-04 RX ORDER — FLUMAZENIL 0.1 MG/ML
0.2 INJECTION, SOLUTION INTRAVENOUS
Status: DISCONTINUED | OUTPATIENT
Start: 2018-12-04 | End: 2018-12-04 | Stop reason: HOSPADM

## 2018-12-04 RX ORDER — ONDANSETRON 4 MG/1
4 TABLET, ORALLY DISINTEGRATING ORAL EVERY 6 HOURS PRN
Status: DISCONTINUED | OUTPATIENT
Start: 2018-12-04 | End: 2018-12-04 | Stop reason: HOSPADM

## 2018-12-04 RX ORDER — ONDANSETRON 2 MG/ML
4 INJECTION INTRAMUSCULAR; INTRAVENOUS
Status: DISCONTINUED | OUTPATIENT
Start: 2018-12-04 | End: 2018-12-04 | Stop reason: HOSPADM

## 2018-12-04 RX ORDER — FENTANYL CITRATE 50 UG/ML
INJECTION, SOLUTION INTRAMUSCULAR; INTRAVENOUS PRN
Status: DISCONTINUED | OUTPATIENT
Start: 2018-12-04 | End: 2018-12-04 | Stop reason: HOSPADM

## 2018-12-04 NOTE — OP NOTE
See Provation Note In Chart    Joann Delcid MD  [facility]  Office Phone # [phone]

## 2018-12-04 NOTE — IP AVS SNAPSHOT
MRN:[id number]                      After Visit Summary   12/4/2018    Gab Holloway    MRN: [id number]           Thank you!     Thank you for choosing [facility] for your care. Our goal is always to provide you with excellent care. Hearing back from our patients is one way we can continue to improve our services. Please take a few minutes to complete the written survey that you may receive in the mail after you visit. If you would like to speak to someone directly about your visit please contact Patient Relations at [phone]. Thank you!          Patient Information     Date Of Birth          1960        About your hospital stay     You were admitted on:  December 4, 2018 You last received care in the:  [facility]    You were discharged on:  December 4, 2018       Who to Call     For medical emergencies, please call 911.  For non-urgent questions about your medical care, please call your primary care provider or clinic, None  For questions related to your surgery, please call your surgery clinic        Attending Provider     Provider Specialty    Joann Delcid MD Colon and Rectal Surgery       Primary Care Provider Fax #    Physician No Ref-Primary [phone]      Your next 10 appointments already scheduled     Feb 21, 2019  9:30 AM CST   CT CHEST/ABDOMEN/PELVIS W CONTRAST with [address] [facility] [place] ([facility])    [address] [address] [address] [phone]           How do I prepare for my exam? (Food and drink instructions) To prepare: Do not eat or drink for 2 hours before your exam. If you need to take medicine, you may take it with small sips of water. (We may ask you to take liquid medicine as well.)  How do I prepare for my exam? (Other instructions) Please arrive 30 minutes early for your CT.  Once in the department you might be asked to drink water 15-20 minutes  prior to your exam.  If indicated you may be asked to drink an oral contrast in advance of your CT.  If this is the case, the imaging team will let you know or be in contact with you prior to your appointment  Patients over 70 or patients with diabetes or kidney problems: If you haven t had a blood test (creatinine test) within the last 30 days, the Cardiologist/Radiologist may require you to get this test prior to your exam.  If you have diabetes:  Continue to take your metformin medication on the day of your exam  What should I wear: Please wear loose clothing, such as a sweat suit or jogging clothes. Avoid snaps, zippers and other metal. We may ask you to undress and put on a hospital gown.  How long does the exam take: Most scans take less than 20 minutes.  What should I bring: Please bring any scans or X-rays taken at other hospitals, if similar tests were done. Also bring a list of your medicines, including vitamins, minerals and over-the-counter drugs. It is safest to leave personal items at home.  Do I need a : No  is needed.  What do I need to tell my doctor? Be sure to tell your doctor: * If you have any allergies. * If there s any chance you are pregnant. * If you are breastfeeding.  What should I do after the exam: No restrictions, You may resume normal activities.  What is this test: A CT (computed tomography) scan is a series of pictures that allows us to look inside your body. The scanner creates images of the body in cross sections, much like slices of bread. This helps us see any problems more clearly. You may receive contrast (X-ray dye) before or during your scan. You will be asked to drink the contrast.  Who should I call with questions: If you have any questions, please call the Imaging Department where you will have your exam. Directions, parking instructions, and other information is available on our website, [email or web address].              Further instructions from your care  team         Understanding Colon and Rectal Polyps     The colon has a smooth lining composed of millions of cells.     The colon (also called the large intestine) is a muscular tube that forms the last part of the digestive tract. It absorbs water and stores food waste. The colon is about 4 to 6 feet long. The rectum is the last 6 inches of the colon. The colon and rectum have a smooth lining composed of millions of cells. Changes in these cells can lead to growths in the colon that can become cancerous and should be removed.     When the Colon Lining Changes  Changes that occur in the cells that line the colon or rectum can lead to growths called polyps. Over a period of years, polyps can turn cancerous. Removing polyps early may prevent cancer from ever forming.      Polyps  Polyps are fleshy clumps of tissue that form on the lining of the colon or rectum. Small polyps are usually benign (not cancerous). However, over time, cells in a polyp can change and become cancerous. The larger a polyp grows, the more likely this is to happen. Also, certain types of polyps known as adenomatous polyps are considered premalignant. This means that they will almost always become cancerous if they re not removed.          Cancer  Almost all colorectal cancers start when polyp cells begin growing abnormally. As a cancerous tumor grows, it may involve more and more of the colon or rectum. In time, cancer can also grow beyond the colon or rectum and spread to nearby organs or to glands called lymph nodes. The cells can also travel to other parts of the body. This is known as metastasis. The earlier a cancerous tumor is removed, the better the chance of preventing its spread.        [id number] [facility], [address] [place] [address], [place] [address]. All rights reserved. This information is not intended as a substitute for professional medical care. Always follow your healthcare professional's instructions.    Pending Results      "No orders found from 12/2/2018 to 12/5/2018.            Admission Information     Date & Time Provider Department Dept. Phone    12/4/2018 Joann Delcid MD [facility] [phone]      Your Vitals Were     Blood Pressure Respirations Height Weight Pulse Oximetry BMI (Body Mass Index)    113/78 19 1.727 m (5' 8\") 88.9 kg (196 lb) 99% 29.8 kg/m2      MyChart Information     [employer] gives you secure access to your electronic health record. If you see a primary care provider, you can also send messages to your care team and make appointments. If you have questions, please call your primary care clinic.  If you do not have a primary care provider, please call [phone] and they will assist you.        Care EveryWhere ID     This is your Care EveryWhere ID. This could be used by other organizations to access your [place] medical records  [id number]        Equal Access to Services     ARUNA MIRANDA : Marylin Whatley, washaina brantley, qabecka kaalmanicole andrews, mitar randolph . So [facility] [phone].    ATENCIÓN: Si habla español, tiene a hannon disposición servicios gratuitos de asistencia lingüística. Llame al [phone].    We comply with applicable federal civil rights laws and [place] laws. We do not discriminate on the basis of race, color, national origin, age, disability, sex, sexual orientation, or gender identity.               Review of your medicines      CONTINUE these medicines which have NOT CHANGED        Dose / Directions    order for DME   Used for:  Carpal tunnel syndrome of left wrist        Left hand thumb spica splint   Quantity:  1 Units   Refills:  0       RIVAROXABAN PO        Take by mouth daily   Refills:  0                Protect others around you: Learn how to safely use, store and throw away your medicines at www.disposemymeds.org.             Medication List: This is a list of all your medications and when to take them. Check " marks below indicate your daily home schedule. Keep this list as a reference.      Medications           Morning Afternoon Evening Bedtime As Needed    order for DME   Left hand thumb spica splint                                RIVAROXABAN PO   Take by mouth daily

## 2018-12-04 NOTE — IP AVS SNAPSHOT
[facility]    [address] [facility] [address]    Phone:  [phone]    Fax:  [phone]                                       After Visit Summary   12/4/2018    Gab Holloway    MRN: [id number]           After Visit Summary Signature Page     I have received my discharge instructions, and my questions have been answered. I have discussed any challenges I see with this plan with the nurse or doctor.    ..........................................................................................................................................  Patient/Patient Representative Signature      ..........................................................................................................................................  Patient Representative Print Name and Relationship to Patient    ..................................................               ................................................  Date                                   Time    ..........................................................................................................................................  Reviewed by Signature/Title    ...................................................              ..............................................  Date                                               Time          22EPIC Rev 08/18

## 2018-12-04 NOTE — H&P
Pre-Endoscopy History and Physical     Gab Holloway MRN# [id number]   YOB: 1960 Age: 58 year old     Date of Procedure: 12/4/2018  Primary care provider: No Ref-Primary, Physician  Type of Endoscopy: colonoscopy  Reason for Procedure: surveillance, prior colon cancer   Type of Anesthesia Anticipated: Moderate Sedation    HPI:    Gab is a 58 year old male who will be undergoing the above procedure.      A history and physical has been performed. The patient's medications and allergies have been reviewed. The risks and benefits of the procedure and the sedation options and risks were discussed with the patient.  All questions were answered and informed consent was obtained.      He denies a personal or family history of anesthesia complications or bleeding disorders.     No Known Allergies     Prior to Admission Medications   Prescriptions Last Dose Informant Patient Reported? Taking?   RIVAROXABAN PO Past Week  Yes Yes   Sig: Take by mouth daily   order for DME   No No   Sig: Left hand thumb spica splint      Facility-Administered Medications: None       Patient Active Problem List   Diagnosis     Colon obstruction (H)     Colon cancer (H)        Past Medical History:   Diagnosis Date     Cancer (H)      Colon cancer (H)         Past Surgical History:   Procedure Laterality Date     COLECTOMY WITH COLOSTOMY, COMBINED N/A 3/1/2018    Procedure: COMBINED COLECTOMY WITH COLOSTOMY;;  Surgeon: Joann Delcid MD;  Location:  OR     COLONOSCOPY       LAPAROSCOPIC ASSISTED COLECTOMY LEFT (DESCENDING) Left 3/1/2018    Procedure: LAPAROSCOPIC ASSISTED COLECTOMY LEFT (DESCENDING);   laparoscopic assistedconverted to open left leilani colectomy and colostomy;  Surgeon: Joann Delcid MD;  Location:  OR       Social History   Substance Use Topics     Smoking status: Never Smoker     Smokeless tobacco: Never Used     Alcohol use Yes      Comment: weekends       Family History   Problem  "Relation Age of Onset     Colon Cancer No family hx of        REVIEW OF SYSTEMS:     5 point ROS negative except as noted above in HPI, including Gen., Resp., CV, GI &  system review.      PHYSICAL EXAM:   /83  Resp 18  Ht 1.727 m (5' 8\")  Wt 88.9 kg (196 lb)  SpO2 100%  BMI 29.8 kg/m2 Estimated body mass index is 29.8 kg/(m^2) as calculated from the following:    Height as of this encounter: 1.727 m (5' 8\").    Weight as of this encounter: 88.9 kg (196 lb).   GENERAL APPEARANCE: healthy and alert  MENTAL STATUS: alert  AIRWAY EXAM: Mallampatti Class II (visualization of the soft palate, fauces, and uvula)  RESP: lungs clear to auscultation - no rales, rhonchi or wheezes  CV: regular rates and rhythm      DIAGNOSTICS:    Not indicated      IMPRESSION   ASA Class 2 - Mild systemic disease        PLAN:       Plan for colonoscopy. We discussed the risks, benefits and alternatives and the patient wished to proceed.    The above has been forwarded to the consulting provider.      Signed Electronically by: Joann Delcid MD  December 4, 2018    "

## 2018-12-04 NOTE — DISCHARGE INSTRUCTIONS

## 2018-12-05 LAB — COPATH REPORT: NORMAL

## 2019-02-21 ENCOUNTER — HOSPITAL ENCOUNTER (OUTPATIENT)
Dept: CT IMAGING | Facility: CLINIC | Age: 59
Discharge: HOME | End: 2019-02-21
Attending: INTERNAL MEDICINE | Admitting: INTERNAL MEDICINE
Payer: COMMERCIAL

## 2019-02-21 DIAGNOSIS — C18.9 COLON CANCER (H): ICD-10-CM

## 2019-02-21 PROCEDURE — 25000128 H RX IP 250 OP 636: Performed by: INTERNAL MEDICINE

## 2019-02-21 PROCEDURE — 74177 CT ABD & PELVIS W/CONTRAST: CPT

## 2019-02-21 PROCEDURE — 71260 CT THORAX DX C+: CPT

## 2019-02-21 RX ORDER — IOPAMIDOL 755 MG/ML
500 INJECTION, SOLUTION INTRAVASCULAR ONCE
Status: COMPLETED | OUTPATIENT
Start: 2019-02-21 | End: 2019-02-21

## 2019-02-21 RX ADMIN — IOPAMIDOL 95 ML: 755 INJECTION, SOLUTION INTRAVENOUS at 09:32

## 2019-02-21 RX ADMIN — SODIUM CHLORIDE 65 ML: 9 INJECTION, SOLUTION INTRAVENOUS at 09:32

## 2019-03-18 ENCOUNTER — OFFICE VISIT (OUTPATIENT)
Dept: FAMILY MEDICINE | Facility: CLINIC | Age: 59
End: 2019-03-18
Payer: COMMERCIAL

## 2019-03-18 VITALS
WEIGHT: 204 LBS | DIASTOLIC BLOOD PRESSURE: 70 MMHG | TEMPERATURE: 98.1 F | HEIGHT: 68 IN | OXYGEN SATURATION: 99 % | SYSTOLIC BLOOD PRESSURE: 120 MMHG | BODY MASS INDEX: 30.92 KG/M2 | HEART RATE: 102 BPM | RESPIRATION RATE: 19 BRPM

## 2019-03-18 DIAGNOSIS — Z01.818 PREOP GENERAL PHYSICAL EXAM: Primary | ICD-10-CM

## 2019-03-18 DIAGNOSIS — Z93.3 COLOSTOMY STATUS (H): ICD-10-CM

## 2019-03-18 DIAGNOSIS — I81 PORTAL VEIN THROMBOSIS: ICD-10-CM

## 2019-03-18 DIAGNOSIS — C18.6 MALIGNANT NEOPLASM OF DESCENDING COLON (H): ICD-10-CM

## 2019-03-18 PROCEDURE — 90471 IMMUNIZATION ADMIN: CPT | Performed by: FAMILY MEDICINE

## 2019-03-18 PROCEDURE — 99214 OFFICE O/P EST MOD 30 MIN: CPT | Mod: 25 | Performed by: FAMILY MEDICINE

## 2019-03-18 PROCEDURE — 93000 ELECTROCARDIOGRAM COMPLETE: CPT | Performed by: FAMILY MEDICINE

## 2019-03-18 PROCEDURE — 90715 TDAP VACCINE 7 YRS/> IM: CPT | Performed by: FAMILY MEDICINE

## 2019-03-18 ASSESSMENT — MIFFLIN-ST. JEOR: SCORE: 1714.84

## 2019-03-18 NOTE — PROGRESS NOTES
[facility]  [address] [facility] [address]  [phone]  Dept: [phone]    PRE-OP EVALUATION:  Today's date: 3/18/2019    Gba Holloway (: 1960) presents for pre-operative evaluation assessment as requested by Dr. Delcid .  He requires evaluation and anesthesia risk assessment prior to undergoing surgery/procedure for treatment of laparoscopic possible open colostomy reversal .    Primary Physician: No Ref-Primary, Physician  Type of Anesthesia Anticipated: General    Patient has a Health Care Directive or Living Will:  NO    Preop Questions 3/14/2019   Who is doing your surgery? Joann delcid   What are you having done? Ostomy take down & colon resection   Date of Surgery/Procedure: 3-26-19   Facility or Hospital where procedure/surgery will be performed: [facility]   1.  Do you have a history of Heart attack, stroke, stent, coronary bypass surgery, or other heart surgery? No   2.  Do you ever have any pain or discomfort in your chest? No   3.  Do you have a history of  Heart Failure? No   4.   Are you troubled by shortness of breath when:  walking on a level surface, or up a slight hill, or at night? No   5.  Do you currently have a cold, bronchitis or other respiratory infection? No   6.  Do you have a cough, shortness of breath, or wheezing? No   7.  Do you sometimes get pains in the calves of your legs when you walk? No   8. Do you or anyone in your family have previous history of blood clots? No   9.  Do you or does anyone in your family have a serious bleeding problem such as prolonged bleeding following surgeries or cuts? No   10. Have you ever had problems with anemia or been told to take iron pills? No   11. Have you had any abnormal blood loss such as black, tarry or bloody stools? No   12. Have you ever had a blood transfusion? No   13. Have you or any of your relatives ever had problems with anesthesia? No   14. Do you have sleep apnea, excessive  snoring or daytime drowsiness? No   15. Do you have any prosthetic heart valves? No   16. Do you have prosthetic joints? No       HPI:     HPI related to upcoming procedure: Laparoscopic possible open colostomy reversal for treatment of colon cancer.     History of portal vein thrombosis, treated with six months of anticoagulation with xarelto. Patient now off of the xarelto.     Patient is otherwise healthy and active.     MEDICAL HISTORY:     Patient Active Problem List    Diagnosis Date Noted     Portal vein thrombosis 03/18/2019     Priority: Medium     Colon cancer (H) 03/01/2018     Priority: Medium     Colon obstruction (H) 02/27/2018     Priority: Medium      Past Medical History:   Diagnosis Date     Cancer (H)      Colon cancer (H)      Portal vein thrombosis 03/18/2019    After partial colectomy for colon cancer     Past Surgical History:   Procedure Laterality Date     COLECTOMY WITH COLOSTOMY, COMBINED N/A 3/1/2018    Procedure: COMBINED COLECTOMY WITH COLOSTOMY;;  Surgeon: Joann Delcid MD;  Location: [facility] OR     COLONOSCOPY       LAPAROSCOPIC ASSISTED COLECTOMY LEFT (DESCENDING) Left 3/1/2018    Procedure: LAPAROSCOPIC ASSISTED COLECTOMY LEFT (DESCENDING);   laparoscopic assistedconverted to open left leilani colectomy and colostomy;  Surgeon: Joann Delcid MD;  Location: [facility] OR     No current outpatient medications on file.     OTC products: None, except as noted above    No Known Allergies   Latex Allergy: NO    Social History     Tobacco Use     Smoking status: Never Smoker     Smokeless tobacco: Never Used   Substance Use Topics     Alcohol use: Yes     Comment: weekends     History   Drug Use No       REVIEW OF SYSTEMS:   CONSTITUTIONAL: NEGATIVE for fever, chills, change in weight  INTEGUMENTARY/SKIN: NEGATIVE for worrisome rashes, moles or lesions  EYES: NEGATIVE for vision changes or irritation  ENT/MOUTH: NEGATIVE for ear, mouth and throat problems  RESP: NEGATIVE for significant  "cough or SOB  CV: NEGATIVE for chest pain, palpitations or peripheral edema  GI: NEGATIVE for nausea, abdominal pain, heartburn, or change in bowel habits  : NEGATIVE for frequency, dysuria, or hematuria  MUSCULOSKELETAL: NEGATIVE for significant arthralgias or myalgia  NEURO: NEGATIVE for weakness, dizziness or paresthesias  PSYCHIATRIC: NEGATIVE for changes in mood or affect    This document serves as a record of the services and decisions personally performed and made by Bry Andrews MD. It was created on his behalf by Namrata Garcia, a trained medical scribe. The creation of this document is based on the provider's statements to the medical scribe.  Namrata Garcia 7:30 AM March 18, 2019    EXAM:   /70 (BP Location: Right arm, Patient Position: Chair, Cuff Size: Adult Regular)   Pulse 102   Temp 98.1  F (36.7  C) (Oral)   Resp 19   Ht 1.727 m (5' 8\")   Wt 92.5 kg (204 lb)   SpO2 99%   BMI 31.02 kg/m      GENERAL APPEARANCE: healthy, alert and no distress     EYES: EOMI,  PERRL     HENT: ear canals and TM's normal and nose and mouth without ulcers or lesions     NECK: no adenopathy, no asymmetry, masses, or scars and thyroid normal to palpation     RESP: lungs clear to auscultation - no rales, rhonchi or wheezes     CV: regular rates and rhythm, normal S1 S2, no S3 or S4 and no murmur, click or rub     ABDOMEN:  soft, nontender, no HSM or masses and bowel sounds normal     MS: extremities normal- no gross deformities noted, no evidence of inflammation in joints, FROM in all extremities.     SKIN: Clean and dry around stoma, no suspicious lesions or rashes     NEURO: Normal strength and tone, sensory exam grossly normal, mentation intact and speech normal     PSYCH: mentation appears normal. and affect normal/bright     LYMPHATICS: No cervical adenopathy    DIAGNOSTICS:   EKG: sinus tachycardia, normal axis, normal intervals, no acute ST/T changes c/w ischemia, no LVH by voltage criteria    Will " have recent labs from [place] [facility] faxed  Recent Labs   Lab Test 03/14/18  0515 03/13/18  0510 03/12/18  0530  03/07/18  1020   HGB  --  11.5*  --   --  11.2*    366  --    < > 204   INR  --   --  1.09  --  1.12     --  136   < > 141   POTASSIUM 4.2  --  4.2   < > 3.3*   CR 0.68  --  0.68   < > 0.66    < > = values in this interval not displayed.     IMPRESSION:   Reason for surgery/procedure: colon cancer   Diagnosis/reason for consult: preoperative evaluation for assessment of cardiovascular and respiratory disease as well as overall risk assessment and perioperative medical management.    The proposed surgical procedure is considered INTERMEDIATE risk.    REVISED CARDIAC RISK INDEX  The patient has the following serious cardiovascular risks for perioperative complications such as (MI, PE, VFib and 3  AV Block):  No serious cardiac risks  INTERPRETATION: 0 risks: Class I (very low risk - 0.4% complication rate)    The patient has the following additional risks for perioperative complications:  No identified additional risks      ICD-10-CM    1. Preop general physical exam Z01.818 EKG 12-lead complete w/read - Clinics   2. Colostomy status (H) Z93.3    3. Malignant neoplasm of descending colon (H) C18.6    4. Portal vein thrombosis I81        RECOMMENDATIONS:       Cardiovascular Risk  Performs 4 METs exercise without symptoms (Climb a flight of stairs and Walk on level ground at 15 minutes per mile (4 miles/hour)) .       --Patient is to take all scheduled medications on the day of surgery EXCEPT for modifications listed below.    Anticoagulant or Antiplatelet Medication Use  ASPIRIN: Discontinue ASA 7-10 days prior to procedure to reduce bleeding risk.  NSAIDS: Ibuprofen (Motrin):         Stop five days prior to surgery  Naproxen (Naprosyn):   Stop five days prior to surgery        APPROVAL GIVEN to proceed with proposed procedure, without further diagnostic evaluation     The information in  this document, created by the medical scribe for me, accurately reflects the services I personally performed and the decisions made by me. I have reviewed and approved this document for accuracy prior to leaving the patient care area.  March 18, 2019 7:45 AM    Signed Electronically by: Bry Andrews MD    Copy of this evaluation report is provided to requesting physician.    [place] Preop Guidelines    Revised Cardiac Risk Index

## 2019-03-18 NOTE — NURSING NOTE
Screening Questionnaire for Adult Immunization    Are you sick today?   No   Do you have allergies to medications, food, a vaccine component or latex?   No   Have you ever had a serious reaction after receiving a vaccination?   No   Do you have a long-term health problem with heart disease, lung disease, asthma, kidney disease, metabolic disease (e.g. diabetes), anemia, or other blood disorder?   No   Do you have cancer, leukemia, HIV/AIDS, or any other immune system problem?   No   In the past 3 months, have you taken medications that affect  your immune system, such as prednisone, other steroids, or anticancer drugs; drugs for the treatment of rheumatoid arthritis, Crohn s disease, or psoriasis; or have you had radiation treatments?   No   Have you had a seizure, or a brain or other nervous system problem?   No   During the past year, have you received a transfusion of blood or blood     products, or been given immune (gamma) globulin or antiviral drug?   No   For women: Are you pregnant or is there a chance you could become        pregnant during the next month?   No   Have you received any vaccinations in the past 4 weeks?   No     Patient instructed to remain in clinic for 20 minutes afterwards, and to report any adverse reaction to me immediately.       Screening performed by Samreen Bueno CMA    Prior to injection verified patient identity using patient's name and date of birth.  Per orders of Dr. Andrews, injection of tdap given by Samreen Bueno CMA. Patient instructed to remain in clinic for 20 minutes afterwards, and to report any adverse reaction to me immediately.    Vaccine information supplied.

## 2019-03-23 NOTE — PHARMACY-ADMISSION MEDICATION HISTORY
Medication reconciliation interview completed by pre-admitting nurse, reviewed by pharmacy. No further clarifications needed.     Nurse Complete Ronna Andrea RN Mon Mar 18, 2019  2:19 PM         Prior to Admission medications    Medication Sig Last Dose Taking? Auth Provider   Boswellia Tomasz (BOSWELLIA PO) Take 1 capsule by mouth daily  Yes Unknown, Entered By History   Sorin, Zingiber officinalis, (SORIN PO) Take 1 capsule by mouth daily  Yes Unknown, Entered By History   JANIS PO Take 1 capsule by mouth daily Magnesium with [place]  Yes Unknown, Entered By History   Misc Natural Products (SUPER GREENS PO) Take 1 capsule by mouth daily  Yes Unknown, Entered By History   TURMERIC PO Take 1 capsule by mouth daily  Yes Unknown, Entered By History

## 2019-03-25 ENCOUNTER — HOSPITAL ENCOUNTER (OUTPATIENT)
Dept: LAB | Facility: CLINIC | Age: 59
Discharge: HOME | End: 2019-03-25
Attending: COLON & RECTAL SURGERY | Admitting: COLON & RECTAL SURGERY
Payer: COMMERCIAL

## 2019-03-25 DIAGNOSIS — Z01.812 PRE-OPERATIVE LABORATORY EXAMINATION: ICD-10-CM

## 2019-03-25 LAB
ABO + RH BLD: NORMAL
ABO + RH BLD: NORMAL
BLD GP AB SCN SERPL QL: NORMAL
BLOOD BANK CMNT PATIENT-IMP: NORMAL
HGB BLD-MCNC: 15.7 G/DL (ref 13.3–17.7)
SPECIMEN EXP DATE BLD: NORMAL

## 2019-03-25 PROCEDURE — 86850 RBC ANTIBODY SCREEN: CPT | Performed by: COLON & RECTAL SURGERY

## 2019-03-25 PROCEDURE — 86901 BLOOD TYPING SEROLOGIC RH(D): CPT | Performed by: COLON & RECTAL SURGERY

## 2019-03-25 PROCEDURE — 36415 COLL VENOUS BLD VENIPUNCTURE: CPT | Performed by: COLON & RECTAL SURGERY

## 2019-03-25 PROCEDURE — 85018 HEMOGLOBIN: CPT | Performed by: COLON & RECTAL SURGERY

## 2019-03-25 PROCEDURE — 86900 BLOOD TYPING SEROLOGIC ABO: CPT | Performed by: COLON & RECTAL SURGERY

## 2019-03-26 ENCOUNTER — HOSPITAL ENCOUNTER (INPATIENT)
Facility: CLINIC | Age: 59
LOS: 17 days | Discharge: HOME HEALTH | DRG: 329 | End: 2019-04-12
Attending: COLON & RECTAL SURGERY | Admitting: COLON & RECTAL SURGERY
Payer: COMMERCIAL

## 2019-03-26 ENCOUNTER — ANESTHESIA EVENT (OUTPATIENT)
Dept: SURGERY | Facility: CLINIC | Age: 59
DRG: 329 | End: 2019-03-26
Payer: COMMERCIAL

## 2019-03-26 ENCOUNTER — ANESTHESIA (OUTPATIENT)
Dept: SURGERY | Facility: CLINIC | Age: 59
DRG: 329 | End: 2019-03-26
Payer: COMMERCIAL

## 2019-03-26 DIAGNOSIS — C18.9 MALIGNANT NEOPLASM OF COLON, UNSPECIFIED PART OF COLON (H): ICD-10-CM

## 2019-03-26 DIAGNOSIS — K91.89 LARGE BOWEL ANASTOMOTIC LEAK: ICD-10-CM

## 2019-03-26 DIAGNOSIS — Z98.890 HISTORY OF COLOSTOMY REVERSAL: Primary | ICD-10-CM

## 2019-03-26 DIAGNOSIS — T81.49XA SURGICAL SITE INFECTION: ICD-10-CM

## 2019-03-26 DIAGNOSIS — I81 PORTAL VEIN THROMBOSIS: ICD-10-CM

## 2019-03-26 PROBLEM — Z85.038 HISTORY OF COLON CANCER, STAGE II: Status: ACTIVE | Noted: 2019-03-26

## 2019-03-26 LAB
CREAT SERPL-MCNC: 0.92 MG/DL (ref 0.66–1.25)
GFR SERPL CREATININE-BSD FRML MDRD: >90 ML/MIN/{1.73_M2}
PLATELET # BLD AUTO: 172 10E9/L (ref 150–450)

## 2019-03-26 PROCEDURE — 0DN80ZZ RELEASE SMALL INTESTINE, OPEN APPROACH: ICD-10-PCS | Performed by: COLON & RECTAL SURGERY

## 2019-03-26 PROCEDURE — 12000000 ZZH R&B MED SURG/OB

## 2019-03-26 PROCEDURE — 25800030 ZZH RX IP 258 OP 636: Performed by: ANESTHESIOLOGY

## 2019-03-26 PROCEDURE — 40000306 ZZH STATISTIC PRE PROC ASSESS II: Performed by: COLON & RECTAL SURGERY

## 2019-03-26 PROCEDURE — 88304 TISSUE EXAM BY PATHOLOGIST: CPT | Mod: 26 | Performed by: COLON & RECTAL SURGERY

## 2019-03-26 PROCEDURE — 36415 COLL VENOUS BLD VENIPUNCTURE: CPT | Performed by: COLON & RECTAL SURGERY

## 2019-03-26 PROCEDURE — 85049 AUTOMATED PLATELET COUNT: CPT | Performed by: COLON & RECTAL SURGERY

## 2019-03-26 PROCEDURE — 25000128 H RX IP 250 OP 636: Performed by: ANESTHESIOLOGY

## 2019-03-26 PROCEDURE — 25800030 ZZH RX IP 258 OP 636: Performed by: NURSE ANESTHETIST, CERTIFIED REGISTERED

## 2019-03-26 PROCEDURE — 25000125 ZZHC RX 250: Performed by: COLON & RECTAL SURGERY

## 2019-03-26 PROCEDURE — 0WQF0ZZ REPAIR ABDOMINAL WALL, OPEN APPROACH: ICD-10-PCS | Performed by: COLON & RECTAL SURGERY

## 2019-03-26 PROCEDURE — 36000093 ZZH SURGERY LEVEL 4 1ST 30 MIN: Performed by: COLON & RECTAL SURGERY

## 2019-03-26 PROCEDURE — 0WJP4ZZ INSPECTION OF GASTROINTESTINAL TRACT, PERCUTANEOUS ENDOSCOPIC APPROACH: ICD-10-PCS | Performed by: COLON & RECTAL SURGERY

## 2019-03-26 PROCEDURE — 25000128 H RX IP 250 OP 636: Performed by: NURSE ANESTHETIST, CERTIFIED REGISTERED

## 2019-03-26 PROCEDURE — 25000128 H RX IP 250 OP 636: Performed by: COLON & RECTAL SURGERY

## 2019-03-26 PROCEDURE — 71000012 ZZH RECOVERY PHASE 1 LEVEL 1 FIRST HR: Performed by: COLON & RECTAL SURGERY

## 2019-03-26 PROCEDURE — 82565 ASSAY OF CREATININE: CPT | Performed by: COLON & RECTAL SURGERY

## 2019-03-26 PROCEDURE — 25000125 ZZHC RX 250: Performed by: NURSE ANESTHETIST, CERTIFIED REGISTERED

## 2019-03-26 PROCEDURE — 37000009 ZZH ANESTHESIA TECHNICAL FEE, EACH ADDTL 15 MIN: Performed by: COLON & RECTAL SURGERY

## 2019-03-26 PROCEDURE — 27210794 ZZH OR GENERAL SUPPLY STERILE: Performed by: COLON & RECTAL SURGERY

## 2019-03-26 PROCEDURE — 0DBE0ZZ EXCISION OF LARGE INTESTINE, OPEN APPROACH: ICD-10-PCS | Performed by: COLON & RECTAL SURGERY

## 2019-03-26 PROCEDURE — 88304 TISSUE EXAM BY PATHOLOGIST: CPT | Performed by: COLON & RECTAL SURGERY

## 2019-03-26 PROCEDURE — 37000008 ZZH ANESTHESIA TECHNICAL FEE, 1ST 30 MIN: Performed by: COLON & RECTAL SURGERY

## 2019-03-26 PROCEDURE — 36000063 ZZH SURGERY LEVEL 4 EA 15 ADDTL MIN: Performed by: COLON & RECTAL SURGERY

## 2019-03-26 PROCEDURE — 71000013 ZZH RECOVERY PHASE 1 LEVEL 1 EA ADDTL HR: Performed by: COLON & RECTAL SURGERY

## 2019-03-26 PROCEDURE — 25000132 ZZH RX MED GY IP 250 OP 250 PS 637: Performed by: COLON & RECTAL SURGERY

## 2019-03-26 RX ORDER — HEPARIN SODIUM 5000 [USP'U]/.5ML
5000 INJECTION, SOLUTION INTRAVENOUS; SUBCUTANEOUS
Status: COMPLETED | OUTPATIENT
Start: 2019-03-26 | End: 2019-03-26

## 2019-03-26 RX ORDER — NEOSTIGMINE METHYLSULFATE 1 MG/ML
VIAL (ML) INJECTION PRN
Status: DISCONTINUED | OUTPATIENT
Start: 2019-03-26 | End: 2019-03-26

## 2019-03-26 RX ORDER — ACETAMINOPHEN 325 MG/1
975 TABLET ORAL EVERY 8 HOURS
Status: DISCONTINUED | OUTPATIENT
Start: 2019-03-26 | End: 2019-03-28

## 2019-03-26 RX ORDER — HYDROMORPHONE HYDROCHLORIDE 1 MG/ML
.3-.5 INJECTION, SOLUTION INTRAMUSCULAR; INTRAVENOUS; SUBCUTANEOUS EVERY 5 MIN PRN
Status: DISCONTINUED | OUTPATIENT
Start: 2019-03-26 | End: 2019-03-26 | Stop reason: HOSPADM

## 2019-03-26 RX ORDER — LIDOCAINE 40 MG/G
CREAM TOPICAL
Status: DISCONTINUED | OUTPATIENT
Start: 2019-03-26 | End: 2019-04-01

## 2019-03-26 RX ORDER — PROPOFOL 10 MG/ML
INJECTION, EMULSION INTRAVENOUS PRN
Status: DISCONTINUED | OUTPATIENT
Start: 2019-03-26 | End: 2019-03-26

## 2019-03-26 RX ORDER — DEXAMETHASONE SODIUM PHOSPHATE 4 MG/ML
INJECTION, SOLUTION INTRA-ARTICULAR; INTRALESIONAL; INTRAMUSCULAR; INTRAVENOUS; SOFT TISSUE PRN
Status: DISCONTINUED | OUTPATIENT
Start: 2019-03-26 | End: 2019-03-26

## 2019-03-26 RX ORDER — MAGNESIUM HYDROXIDE 1200 MG/15ML
LIQUID ORAL PRN
Status: DISCONTINUED | OUTPATIENT
Start: 2019-03-26 | End: 2019-03-26 | Stop reason: HOSPADM

## 2019-03-26 RX ORDER — ONDANSETRON 2 MG/ML
4 INJECTION INTRAMUSCULAR; INTRAVENOUS EVERY 6 HOURS PRN
Status: DISCONTINUED | OUTPATIENT
Start: 2019-03-26 | End: 2019-04-01

## 2019-03-26 RX ORDER — NALOXONE HYDROCHLORIDE 0.4 MG/ML
.1-.4 INJECTION, SOLUTION INTRAMUSCULAR; INTRAVENOUS; SUBCUTANEOUS
Status: DISCONTINUED | OUTPATIENT
Start: 2019-03-26 | End: 2019-04-01

## 2019-03-26 RX ORDER — ACETAMINOPHEN 325 MG/1
975 TABLET ORAL ONCE
Status: COMPLETED | OUTPATIENT
Start: 2019-03-26 | End: 2019-03-26

## 2019-03-26 RX ORDER — LABETALOL 20 MG/4 ML (5 MG/ML) INTRAVENOUS SYRINGE
10
Status: DISCONTINUED | OUTPATIENT
Start: 2019-03-26 | End: 2019-03-26 | Stop reason: HOSPADM

## 2019-03-26 RX ORDER — BUPIVACAINE HYDROCHLORIDE AND EPINEPHRINE 5; 5 MG/ML; UG/ML
INJECTION, SOLUTION PERINEURAL PRN
Status: DISCONTINUED | OUTPATIENT
Start: 2019-03-26 | End: 2019-03-26 | Stop reason: HOSPADM

## 2019-03-26 RX ORDER — ALVIMOPAN 12 MG/1
12 CAPSULE ORAL 2 TIMES DAILY
Status: DISCONTINUED | OUTPATIENT
Start: 2019-03-27 | End: 2019-03-26

## 2019-03-26 RX ORDER — FENTANYL CITRATE 50 UG/ML
25-50 INJECTION, SOLUTION INTRAMUSCULAR; INTRAVENOUS
Status: DISCONTINUED | OUTPATIENT
Start: 2019-03-26 | End: 2019-03-26 | Stop reason: HOSPADM

## 2019-03-26 RX ORDER — ALVIMOPAN 12 MG/1
12 CAPSULE ORAL ONCE
Status: COMPLETED | OUTPATIENT
Start: 2019-03-26 | End: 2019-03-26

## 2019-03-26 RX ORDER — ONDANSETRON 4 MG/1
4 TABLET, ORALLY DISINTEGRATING ORAL EVERY 30 MIN PRN
Status: DISCONTINUED | OUTPATIENT
Start: 2019-03-26 | End: 2019-03-26 | Stop reason: HOSPADM

## 2019-03-26 RX ORDER — GLYCOPYRROLATE 0.2 MG/ML
INJECTION, SOLUTION INTRAMUSCULAR; INTRAVENOUS PRN
Status: DISCONTINUED | OUTPATIENT
Start: 2019-03-26 | End: 2019-03-26

## 2019-03-26 RX ORDER — CIPROFLOXACIN 2 MG/ML
400 INJECTION, SOLUTION INTRAVENOUS SEE ADMIN INSTRUCTIONS
Status: DISCONTINUED | OUTPATIENT
Start: 2019-03-26 | End: 2019-03-26 | Stop reason: HOSPADM

## 2019-03-26 RX ORDER — CIPROFLOXACIN 2 MG/ML
400 INJECTION, SOLUTION INTRAVENOUS
Status: COMPLETED | OUTPATIENT
Start: 2019-03-26 | End: 2019-03-26

## 2019-03-26 RX ORDER — CIPROFLOXACIN 2 MG/ML
400 INJECTION, SOLUTION INTRAVENOUS EVERY 12 HOURS
Status: COMPLETED | OUTPATIENT
Start: 2019-03-27 | End: 2019-03-27

## 2019-03-26 RX ORDER — SODIUM CHLORIDE, SODIUM LACTATE, POTASSIUM CHLORIDE, CALCIUM CHLORIDE 600; 310; 30; 20 MG/100ML; MG/100ML; MG/100ML; MG/100ML
INJECTION, SOLUTION INTRAVENOUS CONTINUOUS PRN
Status: DISCONTINUED | OUTPATIENT
Start: 2019-03-26 | End: 2019-03-26

## 2019-03-26 RX ORDER — ONDANSETRON 2 MG/ML
INJECTION INTRAMUSCULAR; INTRAVENOUS PRN
Status: DISCONTINUED | OUTPATIENT
Start: 2019-03-26 | End: 2019-03-26

## 2019-03-26 RX ORDER — ALVIMOPAN 12 MG/1
12 CAPSULE ORAL 2 TIMES DAILY
Status: DISCONTINUED | OUTPATIENT
Start: 2019-03-27 | End: 2019-03-29 | Stop reason: CLARIF

## 2019-03-26 RX ORDER — ONDANSETRON 2 MG/ML
4 INJECTION INTRAMUSCULAR; INTRAVENOUS EVERY 30 MIN PRN
Status: DISCONTINUED | OUTPATIENT
Start: 2019-03-26 | End: 2019-03-26 | Stop reason: HOSPADM

## 2019-03-26 RX ORDER — HYDRALAZINE HYDROCHLORIDE 20 MG/ML
2.5-5 INJECTION INTRAMUSCULAR; INTRAVENOUS EVERY 10 MIN PRN
Status: DISCONTINUED | OUTPATIENT
Start: 2019-03-26 | End: 2019-03-26 | Stop reason: HOSPADM

## 2019-03-26 RX ORDER — SODIUM CHLORIDE, SODIUM LACTATE, POTASSIUM CHLORIDE, CALCIUM CHLORIDE 600; 310; 30; 20 MG/100ML; MG/100ML; MG/100ML; MG/100ML
INJECTION, SOLUTION INTRAVENOUS CONTINUOUS
Status: DISCONTINUED | OUTPATIENT
Start: 2019-03-26 | End: 2019-03-26 | Stop reason: HOSPADM

## 2019-03-26 RX ORDER — NALOXONE HYDROCHLORIDE 0.4 MG/ML
.1-.4 INJECTION, SOLUTION INTRAMUSCULAR; INTRAVENOUS; SUBCUTANEOUS
Status: DISCONTINUED | OUTPATIENT
Start: 2019-03-26 | End: 2019-03-26

## 2019-03-26 RX ORDER — SODIUM CHLORIDE, SODIUM LACTATE, POTASSIUM CHLORIDE, CALCIUM CHLORIDE 600; 310; 30; 20 MG/100ML; MG/100ML; MG/100ML; MG/100ML
INJECTION, SOLUTION INTRAVENOUS CONTINUOUS
Status: DISCONTINUED | OUTPATIENT
Start: 2019-03-26 | End: 2019-03-30

## 2019-03-26 RX ORDER — LIDOCAINE HYDROCHLORIDE 10 MG/ML
INJECTION, SOLUTION INFILTRATION; PERINEURAL PRN
Status: DISCONTINUED | OUTPATIENT
Start: 2019-03-26 | End: 2019-03-26

## 2019-03-26 RX ORDER — FENTANYL CITRATE 50 UG/ML
INJECTION, SOLUTION INTRAMUSCULAR; INTRAVENOUS PRN
Status: DISCONTINUED | OUTPATIENT
Start: 2019-03-26 | End: 2019-03-26

## 2019-03-26 RX ADMIN — PHENYLEPHRINE HYDROCHLORIDE 100 MCG: 10 INJECTION, SOLUTION INTRAMUSCULAR; INTRAVENOUS; SUBCUTANEOUS at 12:50

## 2019-03-26 RX ADMIN — PHENYLEPHRINE HYDROCHLORIDE 100 MCG: 10 INJECTION, SOLUTION INTRAMUSCULAR; INTRAVENOUS; SUBCUTANEOUS at 13:29

## 2019-03-26 RX ADMIN — Medication: at 17:16

## 2019-03-26 RX ADMIN — ACETAMINOPHEN 975 MG: 325 TABLET, FILM COATED ORAL at 11:40

## 2019-03-26 RX ADMIN — ROCURONIUM BROMIDE 20 MG: 10 INJECTION INTRAVENOUS at 14:47

## 2019-03-26 RX ADMIN — GLYCOPYRROLATE 0.6 MG: 0.2 INJECTION, SOLUTION INTRAMUSCULAR; INTRAVENOUS at 16:51

## 2019-03-26 RX ADMIN — PHENYLEPHRINE HYDROCHLORIDE 100 MCG: 10 INJECTION, SOLUTION INTRAMUSCULAR; INTRAVENOUS; SUBCUTANEOUS at 13:47

## 2019-03-26 RX ADMIN — HYDROMORPHONE HYDROCHLORIDE 0.5 MG: 1 INJECTION, SOLUTION INTRAMUSCULAR; INTRAVENOUS; SUBCUTANEOUS at 15:04

## 2019-03-26 RX ADMIN — FENTANYL CITRATE 50 MCG: 50 INJECTION, SOLUTION INTRAMUSCULAR; INTRAVENOUS at 17:23

## 2019-03-26 RX ADMIN — METRONIDAZOLE 500 MG: 500 INJECTION, SOLUTION INTRAVENOUS at 19:24

## 2019-03-26 RX ADMIN — DEXAMETHASONE SODIUM PHOSPHATE 8 MG: 4 INJECTION, SOLUTION INTRA-ARTICULAR; INTRALESIONAL; INTRAMUSCULAR; INTRAVENOUS; SOFT TISSUE at 12:24

## 2019-03-26 RX ADMIN — ROCURONIUM BROMIDE 10 MG: 10 INJECTION INTRAVENOUS at 16:12

## 2019-03-26 RX ADMIN — SODIUM CHLORIDE, POTASSIUM CHLORIDE, SODIUM LACTATE AND CALCIUM CHLORIDE: 600; 310; 30; 20 INJECTION, SOLUTION INTRAVENOUS at 13:36

## 2019-03-26 RX ADMIN — ALVIMOPAN 12 MG: 12 CAPSULE ORAL at 11:54

## 2019-03-26 RX ADMIN — ROCURONIUM BROMIDE 10 MG: 10 INJECTION INTRAVENOUS at 15:05

## 2019-03-26 RX ADMIN — FENTANYL CITRATE 100 MCG: 50 INJECTION, SOLUTION INTRAMUSCULAR; INTRAVENOUS at 12:24

## 2019-03-26 RX ADMIN — ROCURONIUM BROMIDE 20 MG: 10 INJECTION INTRAVENOUS at 13:35

## 2019-03-26 RX ADMIN — HYDROMORPHONE HYDROCHLORIDE 0.5 MG: 1 INJECTION, SOLUTION INTRAMUSCULAR; INTRAVENOUS; SUBCUTANEOUS at 15:36

## 2019-03-26 RX ADMIN — HYDROMORPHONE HYDROCHLORIDE 0.5 MG: 1 INJECTION, SOLUTION INTRAMUSCULAR; INTRAVENOUS; SUBCUTANEOUS at 15:27

## 2019-03-26 RX ADMIN — ACETAMINOPHEN 975 MG: 325 TABLET, FILM COATED ORAL at 20:49

## 2019-03-26 RX ADMIN — MIDAZOLAM 2 MG: 1 INJECTION INTRAMUSCULAR; INTRAVENOUS at 12:14

## 2019-03-26 RX ADMIN — ROCURONIUM BROMIDE 10 MG: 10 INJECTION INTRAVENOUS at 15:27

## 2019-03-26 RX ADMIN — HYDROMORPHONE HYDROCHLORIDE 0.5 MG: 1 INJECTION, SOLUTION INTRAMUSCULAR; INTRAVENOUS; SUBCUTANEOUS at 14:47

## 2019-03-26 RX ADMIN — FENTANYL CITRATE 150 MCG: 50 INJECTION, SOLUTION INTRAMUSCULAR; INTRAVENOUS at 12:57

## 2019-03-26 RX ADMIN — CIPROFLOXACIN 400 MG: 2 INJECTION, SOLUTION INTRAVENOUS at 12:40

## 2019-03-26 RX ADMIN — ROCURONIUM BROMIDE 10 MG: 10 INJECTION INTRAVENOUS at 14:23

## 2019-03-26 RX ADMIN — HEPARIN SODIUM 5000 UNITS: 5000 INJECTION, SOLUTION INTRAVENOUS; SUBCUTANEOUS at 11:39

## 2019-03-26 RX ADMIN — PHENYLEPHRINE HYDROCHLORIDE 150 MCG: 10 INJECTION, SOLUTION INTRAMUSCULAR; INTRAVENOUS; SUBCUTANEOUS at 13:04

## 2019-03-26 RX ADMIN — Medication 5 MG: at 16:51

## 2019-03-26 RX ADMIN — LIDOCAINE HYDROCHLORIDE 50 MG: 10 INJECTION, SOLUTION INFILTRATION; PERINEURAL at 12:24

## 2019-03-26 RX ADMIN — ROCURONIUM BROMIDE 50 MG: 10 INJECTION INTRAVENOUS at 12:24

## 2019-03-26 RX ADMIN — SODIUM CHLORIDE, POTASSIUM CHLORIDE, SODIUM LACTATE AND CALCIUM CHLORIDE: 600; 310; 30; 20 INJECTION, SOLUTION INTRAVENOUS at 15:20

## 2019-03-26 RX ADMIN — CIPROFLOXACIN 400 MG: 2 INJECTION, SOLUTION INTRAVENOUS at 23:53

## 2019-03-26 RX ADMIN — SODIUM CHLORIDE, POTASSIUM CHLORIDE, SODIUM LACTATE AND CALCIUM CHLORIDE: 600; 310; 30; 20 INJECTION, SOLUTION INTRAVENOUS at 17:56

## 2019-03-26 RX ADMIN — PROPOFOL 160 MG: 10 INJECTION, EMULSION INTRAVENOUS at 12:24

## 2019-03-26 RX ADMIN — ONDANSETRON 4 MG: 2 INJECTION INTRAMUSCULAR; INTRAVENOUS at 16:00

## 2019-03-26 RX ADMIN — METRONIDAZOLE 500 MG: 500 INJECTION, SOLUTION INTRAVENOUS at 12:16

## 2019-03-26 RX ADMIN — ROCURONIUM BROMIDE 20 MG: 10 INJECTION INTRAVENOUS at 14:00

## 2019-03-26 RX ADMIN — SODIUM CHLORIDE, POTASSIUM CHLORIDE, SODIUM LACTATE AND CALCIUM CHLORIDE: 600; 310; 30; 20 INJECTION, SOLUTION INTRAVENOUS at 11:40

## 2019-03-26 RX ADMIN — FENTANYL CITRATE 50 MCG: 50 INJECTION, SOLUTION INTRAMUSCULAR; INTRAVENOUS at 17:58

## 2019-03-26 RX ADMIN — PHENYLEPHRINE HYDROCHLORIDE 200 MCG: 10 INJECTION, SOLUTION INTRAMUSCULAR; INTRAVENOUS; SUBCUTANEOUS at 13:57

## 2019-03-26 RX ADMIN — PHENYLEPHRINE HYDROCHLORIDE 150 MCG: 10 INJECTION, SOLUTION INTRAMUSCULAR; INTRAVENOUS; SUBCUTANEOUS at 13:13

## 2019-03-26 RX ADMIN — GLYCOPYRROLATE 0.2 MG: 0.2 INJECTION, SOLUTION INTRAMUSCULAR; INTRAVENOUS at 12:24

## 2019-03-26 ASSESSMENT — MIFFLIN-ST. JEOR
SCORE: 1696.56
SCORE: 1696.56
SCORE: 1698.96

## 2019-03-26 ASSESSMENT — ACTIVITIES OF DAILY LIVING (ADL): ADLS_ACUITY_SCORE: 13

## 2019-03-26 NOTE — ANESTHESIA PREPROCEDURE EVALUATION
Anesthesia Pre-Procedure Evaluation    Patient: Gab Holloway   MRN: [id number] : 1960          Preoperative Diagnosis: colostomy status    Procedure(s):  laparoscopic possible open colostomy reversal  TAKEDOWN COLOSTOMY    Past Medical History:   Diagnosis Date     Cancer (H)      Colon cancer (H)      Portal vein thrombosis 2019    After partial colectomy for colon cancer     Past Surgical History:   Procedure Laterality Date     COLECTOMY WITH COLOSTOMY, COMBINED N/A 3/1/2018    Procedure: COMBINED COLECTOMY WITH COLOSTOMY;;  Surgeon: Joann Delcid MD;  Location: [facility] OR     COLONOSCOPY       LAPAROSCOPIC ASSISTED COLECTOMY LEFT (DESCENDING) Left 3/1/2018    Procedure: LAPAROSCOPIC ASSISTED COLECTOMY LEFT (DESCENDING);   laparoscopic assistedconverted to open left leilani colectomy and colostomy;  Surgeon: Joann Delcid MD;  Location: [facility] OR     Anesthesia Evaluation     . Pt has had prior anesthetic.            ROS/MED HX    ENT/Pulmonary:  - neg pulmonary ROS     Neurologic:  - neg neurologic ROS     Cardiovascular:     (+) -Peripheral Vascular Disease-- Other, --. : . . . :. .       METS/Exercise Tolerance:     Hematologic:  - neg hematologic  ROS       Musculoskeletal:  - neg musculoskeletal ROS       GI/Hepatic:  - neg GI/hepatic ROS   (+) liver disease,       Renal/Genitourinary:  - ROS Renal section negative       Endo:  - neg endo ROS       Psychiatric:  - neg psychiatric ROS       Infectious Disease:  - neg infectious disease ROS       Malignancy:   (+) Malignancy History of GI          Other:                          Physical Exam  Normal systems: cardiovascular and pulmonary    Airway   Mallampati: II  TM distance: >3 FB  Neck ROM: full    Dental     Cardiovascular       Pulmonary             Lab Results   Component Value Date    WBC 10.5 2018    HGB 15.7 2019    HCT 34.9 (L) 2018     2018     2018    POTASSIUM 4.2 2018     "CHLORIDE 104 03/14/2018    CO2 26 03/14/2018    BUN 21 03/14/2018    CR 0.68 03/14/2018     (H) 03/14/2018    JANICE 8.1 (L) 03/14/2018    PHOS 3.5 03/14/2018    MAG 2.2 03/14/2018    ALBUMIN 2.4 (L) 03/12/2018    PROTTOTAL 6.4 (L) 03/12/2018    ALT 35 03/12/2018    AST 46 (H) 03/12/2018    ALKPHOS 202 (H) 03/12/2018    BILITOTAL 0.8 03/12/2018    LIPASE 122 02/26/2018    INR 1.09 03/12/2018       Preop Vitals  BP Readings from Last 3 Encounters:   03/18/19 120/70   12/04/18 114/74   07/02/18 110/80    Pulse Readings from Last 3 Encounters:   03/18/19 102   07/02/18 99   03/12/18 91      Resp Readings from Last 3 Encounters:   03/18/19 19   12/04/18 19   03/14/18 18    SpO2 Readings from Last 3 Encounters:   03/18/19 99%   12/04/18 97%   07/02/18 98%      Temp Readings from Last 1 Encounters:   03/18/19 98.1  F (36.7  C) (Oral)    Ht Readings from Last 1 Encounters:   03/18/19 1.727 m (5' 8\")      Wt Readings from Last 1 Encounters:   03/18/19 92.5 kg (204 lb)    Estimated body mass index is 31.02 kg/m  as calculated from the following:    Height as of 3/18/19: 1.727 m (5' 8\").    Weight as of 3/18/19: 92.5 kg (204 lb).       Anesthesia Plan      History & Physical Review  History and physical reviewed and following examination; no interval change.    ASA Status:  2 .    NPO Status:  > 8 hours    Plan for General and ETT with Intravenous and Propofol induction. Maintenance will be Balanced.    PONV prophylaxis:  Ondansetron (or other 5HT-3) and Dexamethasone or Solumedrol       Postoperative Care  Postoperative pain management:  IV analgesics.      Consents  Anesthetic plan, risks, benefits and alternatives discussed with:  Patient.  Use of blood products discussed: Yes.   Use of blood products discussed with Patient.  Consented to blood products.  .                 Rosendo Butler MD                    .  "

## 2019-03-26 NOTE — ANESTHESIA CARE TRANSFER NOTE
Patient: Gab Holloway    Procedure(s):  LAPAROSCOPY AND OPEN COLOSTOMY TAKEDOWN  OPEN COLOSTOMY TAKEDOWN    Diagnosis: colostomy status  Diagnosis Additional Information: No value filed.    Anesthesia Type:   General, ETT     Note:  Airway :Face Mask  Patient transferred to:PACU  Comments: Report and signed off to RN in PACU.  Good Resps, skin pink, VSS, O2 via face tent.      Vitals: (Last set prior to Anesthesia Care Transfer)    CRNA VITALS  3/26/2019 1630 - 3/26/2019 1706      3/26/2019             NIBP:  140/86    Pulse:  106    NIBP Mean:  101    SpO2:  99 %    Resp Rate (observed):  2  (Abnormal)                 Electronically Signed By: RUBEN Valerio CRNA  March 26, 2019  5:06 PM

## 2019-03-26 NOTE — BRIEF OP NOTE
[facility]    Brief Operative Note    Pre-operative diagnosis: colostomy status  Post-operative diagnosis history of left colon cancer and unwanted colostomy  Procedure: Procedure(s):  LAPAROSCOPY AND OPEN COLOSTOMY TAKEDOWN  OPEN COLOSTOMY TAKEDOWN  Surgeon: Surgeon(s) and Role:     * Joann Delcid MD - Primary     * Lida Rodrigues PA-C - Assisting      * Raj Casanova MD - [facility] Fellow  Anesthesia: General   Estimated blood loss: Less than 100 ml  Drains: None  Specimens:   ID Type Source Tests Collected by Time Destination   A : Colostomy trim  Tissue Colon SURGICAL PATHOLOGY EXAM Joann Delcid MD 3/26/2019  2:40 PM      Findings:   Normal post operative anatomy. Filmy interloop and abdominal wall small bowel adhesions..  Complications: None.  Implants: None.    Condition on discharge from OR: Satisfactory    Richard Casanova MD   [facility]   [phone].        ADDENDUM:    PATIENT DATA  Indicate Y or N:  Home O2 No  Hemodialysis  No  Transplant patient  No  Cirrhosis  No  Steroids in last 30 days  No  Immunomodulators in last 30 days  No  Anticoagulation at time of surgery  No   List medication   Prior abdominal surgery  Yes  Pelvic irradiation  No    Albumin within 30 days if known    Hgb within 30 days if known    Hemoglobin   Date Value Ref Range Status   03/25/2019 15.7 13.3 - 17.7 g/dL Final   ]  Cr within 30 days if known    Creatinine   Date Value Ref Range Status   03/14/2018 0.68 0.66 - 1.25 mg/dL Final   ]  Body mass index is 30.42 kg/m .      OR DATA  Emergent  No   <24 hours  No   <1 week  No  Bowel Prep Yes  Antibiotics  Yes  DVT prophylaxis    Heparin  Yes   SCD  Yes   None  No  Drain  No  ASA (1,2,3,4) 2  OR time (min) 225  Stents  No  Transfuse >/= 2U  No  Anastomosis   Stapled  Yes   Handsewn  No  Leak Test    Positive  No   Negative  No   Not done  Yes

## 2019-03-26 NOTE — ANESTHESIA POSTPROCEDURE EVALUATION
Patient: Gab Holloway    Procedure(s):  LAPAROSCOPY AND OPEN COLOSTOMY TAKEDOWN  OPEN COLOSTOMY TAKEDOWN    Diagnosis:colostomy status  Diagnosis Additional Information: [facility]     Brief Operative Note     Pre-operative diagnosis:         colostomy status  Post-operative diagnosis        history of left colon cancer and unwanted colostomy  Procedure:      Procedure(s):  LAPAROSCOPY AND OPEN COLOSTOM, Y TAKEDOWN  OPEN COLOSTOMY TAKEDOWN  Surgeon:         Surgeon(s) and Role:     * Joann Delcid MD - Primary     * Lida Rodrigues PA-C - Assisting      * Raj Casanova MD - [facility] Fellow  Anesthesia:     General             Estimated,  blood loss:  Less than 100 ml  Drains: None  Specimens:       ID Type Source Tests Collected by Time Destination  A : Colostomy                 Anesthesia Type:  General, ETT    Note:  Anesthesia Post Evaluation    Patient location during evaluation: PACU  Patient participation: Able to fully participate in evaluation  Level of consciousness: awake  Pain management: adequate  Airway patency: patent  Cardiovascular status: acceptable  Respiratory status: acceptable  Hydration status: acceptable  PONV: none     Anesthetic complications: None          Last vitals:  Vitals:    03/26/19 1710 03/26/19 1715 03/26/19 1720   BP: 157/85 (!) 143/95 143/90   Pulse: 95 91 93   Resp: 18 15 14   Temp:      SpO2: 100% 98% 98%         Electronically Signed By: Rosendo Butler MD  March 26, 2019  5:37 PM

## 2019-03-27 LAB
ANION GAP SERPL CALCULATED.3IONS-SCNC: 6 MMOL/L (ref 3–14)
BUN SERPL-MCNC: 10 MG/DL (ref 7–30)
CALCIUM SERPL-MCNC: 7.9 MG/DL (ref 8.5–10.1)
CHLORIDE SERPL-SCNC: 107 MMOL/L (ref 94–109)
CO2 SERPL-SCNC: 25 MMOL/L (ref 20–32)
CREAT SERPL-MCNC: 0.85 MG/DL (ref 0.66–1.25)
GFR SERPL CREATININE-BSD FRML MDRD: >90 ML/MIN/{1.73_M2}
GLUCOSE SERPL-MCNC: 114 MG/DL (ref 70–99)
HGB BLD-MCNC: 13.1 G/DL (ref 13.3–17.7)
POTASSIUM SERPL-SCNC: 3.8 MMOL/L (ref 3.4–5.3)
SODIUM SERPL-SCNC: 137 MMOL/L (ref 133–144)

## 2019-03-27 PROCEDURE — 36415 COLL VENOUS BLD VENIPUNCTURE: CPT | Performed by: COLON & RECTAL SURGERY

## 2019-03-27 PROCEDURE — 25000132 ZZH RX MED GY IP 250 OP 250 PS 637: Performed by: COLON & RECTAL SURGERY

## 2019-03-27 PROCEDURE — 25800030 ZZH RX IP 258 OP 636: Performed by: COLON & RECTAL SURGERY

## 2019-03-27 PROCEDURE — 80048 BASIC METABOLIC PNL TOTAL CA: CPT | Performed by: COLON & RECTAL SURGERY

## 2019-03-27 PROCEDURE — 25000128 H RX IP 250 OP 636: Performed by: COLON & RECTAL SURGERY

## 2019-03-27 PROCEDURE — 85018 HEMOGLOBIN: CPT | Performed by: COLON & RECTAL SURGERY

## 2019-03-27 PROCEDURE — 12000000 ZZH R&B MED SURG/OB

## 2019-03-27 RX ADMIN — ENOXAPARIN SODIUM 40 MG: 40 INJECTION SUBCUTANEOUS at 12:32

## 2019-03-27 RX ADMIN — ALVIMOPAN 12 MG: 12 CAPSULE ORAL at 09:02

## 2019-03-27 RX ADMIN — CIPROFLOXACIN 400 MG: 2 INJECTION, SOLUTION INTRAVENOUS at 12:32

## 2019-03-27 RX ADMIN — ACETAMINOPHEN 975 MG: 325 TABLET, FILM COATED ORAL at 20:57

## 2019-03-27 RX ADMIN — ALVIMOPAN 12 MG: 12 CAPSULE ORAL at 20:57

## 2019-03-27 RX ADMIN — SODIUM CHLORIDE, POTASSIUM CHLORIDE, SODIUM LACTATE AND CALCIUM CHLORIDE: 600; 310; 30; 20 INJECTION, SOLUTION INTRAVENOUS at 05:05

## 2019-03-27 RX ADMIN — ACETAMINOPHEN 975 MG: 325 TABLET, FILM COATED ORAL at 03:55

## 2019-03-27 RX ADMIN — METRONIDAZOLE 500 MG: 500 INJECTION, SOLUTION INTRAVENOUS at 03:54

## 2019-03-27 RX ADMIN — METRONIDAZOLE 500 MG: 500 INJECTION, SOLUTION INTRAVENOUS at 10:47

## 2019-03-27 RX ADMIN — ACETAMINOPHEN 975 MG: 325 TABLET, FILM COATED ORAL at 12:32

## 2019-03-27 ASSESSMENT — ACTIVITIES OF DAILY LIVING (ADL)
ADLS_ACUITY_SCORE: 13
ADLS_ACUITY_SCORE: 13
ADLS_ACUITY_SCORE: 11
ADLS_ACUITY_SCORE: 13

## 2019-03-27 NOTE — PLAN OF CARE
VSS except temp of 99.8 axil. Alert and Oriented. Up With assist of 1. Stood at bedside this shift. Pain 5/10 using PCA dilaudid and scheduled tylenol. Dressing CDI.

## 2019-03-27 NOTE — OP NOTE
Procedure Date: 03/26/2019      PREOPERATIVE DIAGNOSIS:  Unwanted colostomy, history of colon cancer.      POSTOPERATIVE DIAGNOSIS:  Unwanted colostomy, history of colon cancer.      PROCEDURES:  Exploratory laparoscopy and open colostomy takedown.      SURGEON:  Joann Delcid MD.      ASSISTANT:  Richard Casanova MD, [facility] fellow, and Lida Rodrigues PA-C.      INDICATIONS:  Yoel is a 58-year-old man who presented in 02/2018 with a near complete obstruction of the left colon.  He had an urgent colectomy, revealing a T3 N0 cancer and at that point had had a transverse colostomy created in the right upper quadrant.  He now comes to have this reversed.  In the interim time, he did have a portal venous thrombosis requiring Xarelto and he has been off that now for 3 weeks.      In the office, we discussed the plan for a laparoscopic likely open colostomy takedown.  We discussed the risks of bleeding, infection, possible anastomotic leak that might require another operation or stoma.  We discussed other risks of major abdominal surgery and general anesthesia including but not limited to DVT, PE, heart attack, stroke, urinary tract infections, other blood clots, damage to surrounding structure, other cardiopulmonary events and even death.  He understood and wished to proceed.      FINDINGS:     1.  small parastomal hernia that was repaired.     2.  Extensive adhesions of the small bowel to the midline incision and extensive interloop adhesions.        3.  An isoperistaltic side-to-side anastomosis of the transverse colon to the mid sigmoid.     4.  No other intra-abdominal pathology or palpable lesions were noted.      DESCRIPTION OF PROCEDURE:  After informed consent was obtained, the patient was taken to the operating room.  He was positioned on the operating table in supine position.  He was intubated and a Blount catheter and orogastric tube were placed.  He was positioned in  modified lithotomy position with both arms comfortably tucked on his side.  He was secured to the bed across the chest.  The abdomen was shaved, prepped and draped in the usual fashion.  After appropriate timeout, we began by taking down the colostomy.  We had toweled off the abdomen and excised a rim of skin around the colostomy and elevated this from the subcutaneous tissues.  There was a hernia sac here that was entered and the peritoneal cavity was entered.  The adhesions were freed up circumferentially.  Once we had this up, we had about 10 cm of the colon out of the wound.  We stapled across the open end and carefully inspected that the remaining colon appeared healthy.  The colostomy trim was sent as a specimen.  The colostomy itself was easily dropped back into the abdomen and we occluded the aperture using the 5-9 Lito and secured the balloon port through this.  The abdomen was then insufflated and we looked around.  We did not reveal any evidence of peritoneal disease.  There were extensive adhesions of the small bowel to the anterior abdominal wall, precluding visualization further.  Given the extensive adhesions,  we elected to open.  The Clemencia port was removed as was the Lito.      We opened the mid portion of his prior incision from the upper abdomen down to just below the umbilicus.  We entered into the peritoneal cavity sharply and then began the tedious task of taking the adhesions down.  The fascia was elevated and the Metzenbaum scissors were used to take down the adhesions.  Once we had freed this up circumferentially, we could palpate and see where the colostomy was, and some additional adhesions were taken down from here.  We had felt that we had nice length to get the transverse colon down on the left side.  We then placed a Bookwalter retractor and then began the task of identifying the distal colon.  Extensive small bowel adhesions were lysed and we could see that the colon was right at  the pelvic brim.  This was freed up and lateral attachments were divided.  We then freed up the sigmoid down in the pelvis to allow for better length.  We then medialized the mesentery of this and identified the ureter in the retroperitoneum and traced it down towards the pelvis, well free from our plane of dissection and up cephalad so that we could mobilize the remaining distal colon.  Once we had done this, we felt that there was adequate length over the top of the small bowel, but preferred to mobilize the small bowel up out of the left upper quadrant, so that the anastomosis could lie more comfortably in the retroperitoneum.  We shifted the position of the Bookwalter and lysed the adhesions of the small bowel, elevating the remaining omentum up and taking the small bowel off.  Once we had done this, we could identify the ligament of Treitz coming underneath what little remained of the transverse colon mesentery and the rest of the small bowel was out of the way.  We then could create our anastomosis using an isoperistaltic technique.  A single 3-0 Vicryl suture was placed at the tip of the proximal side to a point roughly 8 cm from the cut edge of the distal part of the anastomosis.  A colotomy was made on each side and the common channel was created using a MAGDALENO-75 blue load.  This came together very nicely.  Inspection within there as we removed the stapler did not reveal any bleeding.  We then offset the staple line and closed the common enterotomy using 3-0 PDS followed by several interrupted Lembert sutures.  At the completion of this, we had a nice healthy isoperistaltic anastomosis.  We swept the small bowel medially and laid the bowel in the retroperitoneum for a very comfortable lie.  We then irrigated with 2 liters of warm saline and found the effluent to be clear.  We turned our attention towards the old stoma site and placed 3 interrupted figure-of-eight sutures internally on the fascia.  We did free  up the muscle circumferentially and closed the anterior sheath with 5 interrupted figure-of-eight sutures horizontally.  We then turned to a clean closure tray and reapproximated the fascia of the midline, incorporating several small hernias with 0 looped PDS.  The wounds were copiously irrigated.  The skin edges reapproximated with 4-0 Monocryl for the midline incision.  A 2-0 Vicryl suture was used to create a pursestring from his prior stoma site, and this was packed with moist gauze.  Prior to full closure, the midline incision and stoma site were injected with a total of 30 mL of 0.5% Marcaine.      The patient was returned to the supine position, extubated in the operating room and taken to recovery in stable condition.  Sponge and needle counts were correct at the end of the case as was the instrument count.      ESTIMATED BLOOD LOSS:  50 mL.      COMPLICATIONS:  No immediate complications.      DRAINS:  None.      SPECIMENS:  The colostomy trim.         JOANN DELCID MD             D: 2019   T: 2019   MT: WT      Name:     TEDDY CLEARY   MRN:      [id number]        Account:        [id number]   :      1960           Procedure Date: 2019      Document: [id number]       cc: Primary Care Physician        Joann Delcid MD

## 2019-03-27 NOTE — PROGRESS NOTES
[facility]  PROGRESS NOTE    March 27, 2019  Post-op Day # 1 exploratory laparoscopy & open colostomy takedown.    SUBJECTIVE:  Pain controlled with PCA dilaudid and scheduled Tylenol.  Patient tolerating water without nausea or vomiting.  Has not passed gas or stool yet. He has stood at the bedside x1 so far.    OBJECTIVE:  Temp:  [97.7  F (36.5  C)-100.1  F (37.8  C)] 97.7  F (36.5  C)  Pulse:  [91-95] 93  Heart Rate:  [] 89  Resp:  [7-20] 16  BP: (103-157)/(61-95) 105/63  SpO2:  [91 %-100 %] 91 %    Intake/Output Summary (Last 24 hours) at 3/27/2019 0756  Last data filed at 3/27/2019 0722  Gross per 24 hour   Intake 3250 ml   Output 1450 ml   Net 1800 ml       GENERAL:  Awake, alert, no acute distress, lying in bed.  HEAD: Nomocephalic atraumatic  SCLERA: anicteric  EXTREMITIES: warm and well perfused  ABDOMEN:  Soft, appropriately tender, non-distended, no rebound or guarding, no peritoneal signs.  Old stoma site with dressing in place with minimal drainage.  INCISION:  C/d/i with surrounding erythema. No fluctuance.    LABS:  Lab Results   Component Value Date    WBC 10.5 03/13/2018     Lab Results   Component Value Date    HGB 13.1 03/27/2019     Lab Results   Component Value Date    HCT 34.9 03/13/2018     Lab Results   Component Value Date     03/26/2019     Last Basic Metabolic Panel:  Lab Results   Component Value Date     03/27/2019      Lab Results   Component Value Date    POTASSIUM 3.8 03/27/2019     Lab Results   Component Value Date    CHLORIDE 107 03/27/2019     Lab Results   Component Value Date    JANICE 7.9 03/27/2019     Lab Results   Component Value Date    CO2 25 03/27/2019     Lab Results   Component Value Date    BUN 10 03/27/2019     Lab Results   Component Value Date    CR 0.85 03/27/2019     Lab Results   Component Value Date     03/27/2019       ASSESSMENT/PLAN:  POD #1 exploratory laparoscopy & open colostomy takedown.  Low grade temp of 100.1  overnight, but improved to 97.7 this morning.  Hemoglobin 13.1 (15.7), labs otherwise unremarkable.  Adequate urine output.    1. Clear liquid diet.  2. Continue current pain regimen, will switch IV pain meds to PO once tolerating full liquids.  3. Keep long for now, will likely remove tomorrow.  4. Continue Lovenox.  5. Encourage OOB.    For questions/paging, please contact the CRS office at [phone].    Lida Rodrigues PA-C  [facility]  Phone: [phone]    [facility] Attending Note    Patient seen and examined independently.  Agree with above assessment and plan.  Doing well,  Up walking some. No nausea, pain controlled  abd round, some incisional bruising.  Old stoma site with slight staining  Plan  Fulls  Encourage ambulation  Discontinue long in am  Hopeful for SLIV and oral pain meds in am.    Joann Delcid MD  [facility]  Office Phone # [phone]

## 2019-03-27 NOTE — PLAN OF CARE
Orientation: Alert and oriented x4  VSS. 91% on 2L O2.   Tele: N/A. HR 98.   LS: Clear, denies SOB/CARBONE  GI: BS audible/hypoactive x4,tolerating CLD, abdomen tender to palpation. Passing gas. No BM this shift. Denies N/V.   : Adequate urine output. Yes, long catheter in place, patent, good urinary output.  Skin: Incision mid abdomen, open to air, closed dermabond, CDI.  Activity: Ax1. Pt slept comfortably throughout shift.   Pain: 4/10 Pt has Dilaudid PCA pump  Plan: Pain control, supportive cares, IV abx,discharge TBD. Continue with current cares.

## 2019-03-27 NOTE — PLAN OF CARE
Ambulatory Status:  Pt up A1 with gait belt and walker. Walked in phipps.  VS:  stable; afebrile.  Pain:  4/10 in abdomen on dilaudid PCA.  Resp: LS clear on RA.  GI:  denies nausea.  fair appetite and on clear liquid diet.  BS hypoactive.  Passing flatus.  Last BM 3/26.  :  long in place.  Skin:  abdominal midline incision closed with liquid bandage. Old ostomy site covered with guaze dressing.  Tx:  flagyl, Cipro.  Labs:  . Hbg 13.1.  Consults:  colorectal.  Disposition:  TBD.

## 2019-03-27 NOTE — CONSULTS
"CLINICAL NUTRITION SERVICES  -  ASSESSMENT NOTE      Recommendations Ordered by Registered Dietitian (RD):   - Diet advancement per MD - avoid high fiber foods once diet is advanced beyond liquids.  - Medical Food Supplement: offered Boost Breeze and Gelatein supplements but pt declined - doesn't like the taste of Boost, and not interested in Gelatein.   Malnutrition:   % Weight Loss:  Weight loss does not meet criteria for malnutrition   % Intake:  Decreased intake does not meet criteria for malnutrition - little intake since admit but good appetite/intake before that   Subcutaneous Fat Loss:  None observed  Muscle Loss:  None observed  Fluid Retention:  Edema around old stoma sight - not nutrition-related     Malnutrition Diagnosis: Patient does not meet two of the above criteria necessary for diagnosing malnutrition       REASON FOR ASSESSMENT  Gab Holloway is a 58 year old male seen by Registered Dietitian for Provider Order - Low residue diet teaching     Pt with hx colon cancer.    NUTRITION HISTORY  Information obtained from pt and chart review:  - Pt received low fiber diet ed during previous stay at [facility] (3/14/18)   - Was on TPN 3/7/18 - 3/14/18 d/t GI issues  - 3/5/18 RD note: regular diet at baseline; pt was trying to change his eating habits over the past year to lose weight  - Before this admit pt reports good appetite; he and his wife were mainly eating 2 meals per day (brunch and dinner) - lots of fish, chicken, certain vegetables  - Was avoiding gas-producing foods with colostomy bag   - Food allergies: NKFA      CURRENT NUTRITION ORDERS  Diet Order:     Clear Liquid     Current Intake/Tolerance:  - \"No appetite\" per flowsheet, abdominal discomfort; per pt visit -appetite has improved since yesterday but is not \"hungry\" - still recovering from surgery  - Currently clear liquids only - has had sips of coffee, water, chicken broth      NUTRITION FOCUSED PHYSICAL ASSESSMENT (NFPA)  Completed:    " "   Yes  -  Full Assessment  Observed      No nutrition-related physical findings  Obtained from Chart/Interdisciplinary Team      Edema around old stoma sight on abdomen       Extensive adhesions to small bowel found during exploratory laprascopy and colostomy takedown (3/26)    ANTHROPOMETRICS  Height: 5' 8\"  Weight: 90.9 kg ( 200 lbs 8 oz )  Body mass index is 30.49 kg/m .  Weight Status:  Obesity Grade I BMI 30-34.9  IBW: 70 kg (154 lbs)  % IBW: 130%   Weight History: Wt is down 1.7% from a week ago, however, little wt data is available, and wt seems to fluctuate in small range over the past year.  Wt Readings from Last 10 Encounters:   03/26/19 90.9 kg (200 lb 8 oz)   03/18/19 92.5 kg (204 lb)   12/04/18 88.9 kg (196 lb)   03/13/18 84.6 kg (186 lb 9.6 oz)       LABS  Labs reviewed    MEDICATIONS  Medications reviewed  Entereg  LR @ 75 ml/hr    ASSESSED NUTRITION NEEDS PER APPROVED PRACTICE GUIDELINES:    Dosing Weight: 75 kg (adjusted) - based on driest wt since admit 90.9 kg on 3/26 and IBW of 70 kg  Estimated Energy Needs: 1875 - 2250 kcals (25 - 30 Kcal/Kg)  Justification: obese, maintenence  Estimated Protein Needs: 113 - 150 grams protein (1.5-2 g pro/Kg)  Justification: post-op, obesity guidelines  and preservation of lean body  Estimated Fluid Needs: 1500 - 1875 mL (1 mL/Kcal)  Justification: maintenance    MALNUTRITION:  % Weight Loss:  Weight loss does not meet criteria for malnutrition   % Intake:  Decreased intake does not meet criteria for malnutrition - little intake since admit but good appetite/intake before that   Subcutaneous Fat Loss:  None observed  Muscle Loss:  None observed  Fluid Retention:  Edema around old stoma sight - not nutrition-related     Malnutrition Diagnosis: Patient does not meet two of the above criteria necessary for diagnosing malnutrition      NUTRITION DIAGNOSIS:  Inadequate protein-energy intake related to clear liquid diet only, altered  GI status with recent surgery, " and poor appetite as evidenced by consumption of only sips of clears past 2 days.      NUTRITION INTERVENTIONS  Recommendations / Nutrition Prescription  - Diet advancement per MD - avoid high fiber foods once diet is advanced beyond liquids.  - Medical Food Supplement: offered Boost Breeze and Gelatein supplements but pt declined - doesn't like the taste of Boost, and not interested in Gelatein.    Implementation  Nutrition education: Reinforced prior education on lower fiber diet guidelines (received 3/14/18) such as avoiding legumes, fruits/veggies, and whole grains. Pt expresses good understanding of these guidelines + was already avoiding fibrous, gas-producing foods PTA d/t colostomy bag.    Composition of Meals and Snacks: pt concerned about possibility of loose stools as he has heard this is a possible complication of bowel resection Talked about adding more fiber to his diet if this occurs (if medically appropriate at that time and diet is advanced past low-fiber recommendation). Also talked about the importance of including good sources of protein once diet is advanced.    Collaboration and Referral of Care - discussed pt during IDT rounds.       Nutrition Goals  Diet advancement past Clear Liquids within 48 hours.      MONITORING AND EVALUATION:  Progress towards goals will be monitored and evaluated per protocol and Practice Guidelines      Cami Huddleston, Dietetic Intern

## 2019-03-28 LAB
COPATH REPORT: NORMAL
GLUCOSE BLDC GLUCOMTR-MCNC: 131 MG/DL (ref 70–99)
LACTATE BLD-SCNC: 1.2 MMOL/L (ref 0.7–2)

## 2019-03-28 PROCEDURE — 00000146 ZZHCL STATISTIC GLUCOSE BY METER IP

## 2019-03-28 PROCEDURE — 36415 COLL VENOUS BLD VENIPUNCTURE: CPT | Performed by: COLON & RECTAL SURGERY

## 2019-03-28 PROCEDURE — 83605 ASSAY OF LACTIC ACID: CPT | Performed by: COLON & RECTAL SURGERY

## 2019-03-28 PROCEDURE — 25000132 ZZH RX MED GY IP 250 OP 250 PS 637: Performed by: COLON & RECTAL SURGERY

## 2019-03-28 PROCEDURE — 25800030 ZZH RX IP 258 OP 636: Performed by: COLON & RECTAL SURGERY

## 2019-03-28 PROCEDURE — 12000000 ZZH R&B MED SURG/OB

## 2019-03-28 PROCEDURE — 25000128 H RX IP 250 OP 636: Performed by: COLON & RECTAL SURGERY

## 2019-03-28 RX ORDER — CALCIUM CARBONATE 500 MG/1
1000 TABLET, CHEWABLE ORAL EVERY 4 HOURS PRN
Status: DISCONTINUED | OUTPATIENT
Start: 2019-03-28 | End: 2019-04-12 | Stop reason: HOSPADM

## 2019-03-28 RX ORDER — ACETAMINOPHEN 325 MG/1
650 TABLET ORAL EVERY 6 HOURS PRN
Status: DISCONTINUED | OUTPATIENT
Start: 2019-03-29 | End: 2019-04-12 | Stop reason: HOSPADM

## 2019-03-28 RX ADMIN — ALVIMOPAN 12 MG: 12 CAPSULE ORAL at 09:05

## 2019-03-28 RX ADMIN — CALCIUM CARBONATE (ANTACID) CHEW TAB 500 MG 1000 MG: 500 CHEW TAB at 18:45

## 2019-03-28 RX ADMIN — ONDANSETRON 4 MG: 2 INJECTION INTRAMUSCULAR; INTRAVENOUS at 17:37

## 2019-03-28 RX ADMIN — ONDANSETRON 4 MG: 2 INJECTION INTRAMUSCULAR; INTRAVENOUS at 04:24

## 2019-03-28 RX ADMIN — ACETAMINOPHEN 975 MG: 325 TABLET, FILM COATED ORAL at 10:59

## 2019-03-28 RX ADMIN — ALVIMOPAN 12 MG: 12 CAPSULE ORAL at 21:20

## 2019-03-28 RX ADMIN — ACETAMINOPHEN 975 MG: 325 TABLET, FILM COATED ORAL at 19:19

## 2019-03-28 RX ADMIN — ENOXAPARIN SODIUM 40 MG: 40 INJECTION SUBCUTANEOUS at 10:59

## 2019-03-28 RX ADMIN — CALCIUM CARBONATE (ANTACID) CHEW TAB 500 MG 1000 MG: 500 CHEW TAB at 02:52

## 2019-03-28 RX ADMIN — SODIUM CHLORIDE, POTASSIUM CHLORIDE, SODIUM LACTATE AND CALCIUM CHLORIDE: 600; 310; 30; 20 INJECTION, SOLUTION INTRAVENOUS at 08:31

## 2019-03-28 RX ADMIN — ONDANSETRON 4 MG: 2 INJECTION INTRAMUSCULAR; INTRAVENOUS at 11:36

## 2019-03-28 ASSESSMENT — ACTIVITIES OF DAILY LIVING (ADL)
ADLS_ACUITY_SCORE: 11
ADLS_ACUITY_SCORE: 13

## 2019-03-28 NOTE — PLAN OF CARE
Ambulatory Status:  Pt up Independently, went on 2 walks in the halls with wife  VS:  t max 99.0 axillary  Pain:  controlled with PCA  Resp: LS Diminished  GI:  had some nausea - IV zofran given.  On full liquid diet taking in minimally.  BS faint hypo. NOT Passing flatus.  Last BM Prior to Surgery.  :  Voiding adequately after long removal this am  Skin:  incisions on abdomin   Tx:  PO entereg. Awaiting ROBF  Disposition:  TBD    Will continue to monitor and provide supportive cares.

## 2019-03-28 NOTE — PLAN OF CARE
Alert and oriented. Up with SBA. Pain controlled with PCA. Tums given for reflux. Pt complained of nausea and bloating. Zofran given. Refused scheduled tylenol due to nausea. Slowed down on oral intake over night. BS hypoactive and not passing gas. Pt tried to pas gas on toilet with no success. Redness around incision. Blount pulled at 0600.

## 2019-03-28 NOTE — PROGRESS NOTES
COLON & RECTAL SURGERY  PROGRESS NOTE    March 28, 2019  Post-op Day # 2    SUBJECTIVE:  Doing well. Some nausea and heartburn last night after eating ice cream, better this AM. No bowel function, starting to get some urges to go. Ambulating. Pain controlled with PCA, not hitting the button much. Blount out this AM, awaiting void.    OBJECTIVE:  Temp:  [97.7  F (36.5  C)-99.4  F (37.4  C)] 98.8  F (37.1  C)  Heart Rate:  [] 98  Resp:  [16-26] 20  BP: (105-121)/(63-71) 115/64  SpO2:  [91 %-95 %] 92 %    Intake/Output Summary (Last 24 hours) at 3/28/2019 0707  Last data filed at 3/28/2019 0600  Gross per 24 hour   Intake 2407 ml   Output 2750 ml   Net -343 ml       GENERAL:  Awake, alert, no acute distress  EXTREMITIES: Warm and well perfused  ABDOMEN:  Soft, minimal appropriate tenderness, non-distended. No guarding, rigidity, or peritoneal signs. Old stoma site packing removed, CDI  INCISION:  C/d/i with dermabond. Some bruising along lower aspect.    LABS:  Lab Results   Component Value Date    WBC 10.5 03/13/2018     Lab Results   Component Value Date    HGB 13.1 03/27/2019     Lab Results   Component Value Date    HCT 34.9 03/13/2018     Lab Results   Component Value Date     03/26/2019     Last Basic Metabolic Panel:  Lab Results   Component Value Date     03/27/2019      Lab Results   Component Value Date    POTASSIUM 3.8 03/27/2019     Lab Results   Component Value Date    CHLORIDE 107 03/27/2019     Lab Results   Component Value Date    JANICE 7.9 03/27/2019     Lab Results   Component Value Date    CO2 25 03/27/2019     Lab Results   Component Value Date    BUN 10 03/27/2019     Lab Results   Component Value Date    CR 0.85 03/27/2019     Lab Results   Component Value Date     03/27/2019       ASSESSMENT/PLAN: 58M POD #2 s/p exploratory laparoscopy & open colostomy takedown.      - Cont FLD until bowel functon  - Continue current pain regimen, will switch IV pain meds to PO once  tolerating full liquids.  - Blount out, await void  - Continue Lovenox.  - Encourage OOB.  - Daily dressing change to old stoma site    Will update staff    For questions/paging, please contact the CRS office at [phone].    Raj Casanova MD  Colorectal Surgery Fellow  [facility]  Phone: [phone]    Colon and Rectal Surgery Attending Note    Patient seen and examined independently.  Agree with above assessment and plan.  Nausea last night. Better now. Blount out. No BM or flatus yet  Alert, sitting in the chair  abd round, expected tenderness, no rebound or guarding, mild bruising  Plan as above.   If feeling better this afternoon, he can advance diet and switch to oral pain meds later.   Lovenox at discharge given prior portal vein thrombus after his last surgery.    Joann Delcid MD  [facility]  Office Phone # [phone]

## 2019-03-28 NOTE — PLAN OF CARE
Ambulatory Status:  Pt up standby. Ambulated in halls x2, up in chair for meal.  Orientation: a/o  VS:  Tmax 99.4, encouraged IS use  Pain:  bryan. Abd pain-PCA   Resp: LS clear.   GI:  denies nausea.  Wesly. Full liquid diet. Hypo BS.  Passing flatus. No BM this shift   :  long patent with 250ml output   Skin:  incision-bruising, dermabonded, Old ostomy dressing with dried drainage-dressing change supplies in room.   Consults:  Surgery  Disposition:  tbd

## 2019-03-29 LAB
ANION GAP SERPL CALCULATED.3IONS-SCNC: 8 MMOL/L (ref 3–14)
BASOPHILS # BLD AUTO: 0 10E9/L (ref 0–0.2)
BASOPHILS NFR BLD AUTO: 0.2 %
BUN SERPL-MCNC: 12 MG/DL (ref 7–30)
CALCIUM SERPL-MCNC: 8.9 MG/DL (ref 8.5–10.1)
CHLORIDE SERPL-SCNC: 104 MMOL/L (ref 94–109)
CO2 SERPL-SCNC: 26 MMOL/L (ref 20–32)
CREAT SERPL-MCNC: 0.74 MG/DL (ref 0.66–1.25)
DIFFERENTIAL METHOD BLD: ABNORMAL
EOSINOPHIL # BLD AUTO: 0 10E9/L (ref 0–0.7)
EOSINOPHIL NFR BLD AUTO: 0.1 %
ERYTHROCYTE [DISTWIDTH] IN BLOOD BY AUTOMATED COUNT: 13.5 % (ref 10–15)
GFR SERPL CREATININE-BSD FRML MDRD: >90 ML/MIN/{1.73_M2}
GLUCOSE SERPL-MCNC: 97 MG/DL (ref 70–99)
HCT VFR BLD AUTO: 42.3 % (ref 40–53)
HGB BLD-MCNC: 13.8 G/DL (ref 13.3–17.7)
IMM GRANULOCYTES # BLD: 0 10E9/L (ref 0–0.4)
IMM GRANULOCYTES NFR BLD: 0.3 %
LYMPHOCYTES # BLD AUTO: 0.8 10E9/L (ref 0.8–5.3)
LYMPHOCYTES NFR BLD AUTO: 8.1 %
MCH RBC QN AUTO: 34 PG (ref 26.5–33)
MCHC RBC AUTO-ENTMCNC: 32.6 G/DL (ref 31.5–36.5)
MCV RBC AUTO: 104 FL (ref 78–100)
MONOCYTES # BLD AUTO: 0.8 10E9/L (ref 0–1.3)
MONOCYTES NFR BLD AUTO: 8.1 %
NEUTROPHILS # BLD AUTO: 7.8 10E9/L (ref 1.6–8.3)
NEUTROPHILS NFR BLD AUTO: 83.2 %
NRBC # BLD AUTO: 0 10*3/UL
NRBC BLD AUTO-RTO: 0 /100
PLATELET # BLD AUTO: 170 10E9/L (ref 150–450)
POTASSIUM SERPL-SCNC: 3.6 MMOL/L (ref 3.4–5.3)
RBC # BLD AUTO: 4.06 10E12/L (ref 4.4–5.9)
SODIUM SERPL-SCNC: 138 MMOL/L (ref 133–144)
WBC # BLD AUTO: 9.4 10E9/L (ref 4–11)

## 2019-03-29 PROCEDURE — 25000128 H RX IP 250 OP 636: Performed by: COLON & RECTAL SURGERY

## 2019-03-29 PROCEDURE — 25800030 ZZH RX IP 258 OP 636: Performed by: PHYSICIAN ASSISTANT

## 2019-03-29 PROCEDURE — 85025 COMPLETE CBC W/AUTO DIFF WBC: CPT | Performed by: COLON & RECTAL SURGERY

## 2019-03-29 PROCEDURE — 25000132 ZZH RX MED GY IP 250 OP 250 PS 637: Performed by: COLON & RECTAL SURGERY

## 2019-03-29 PROCEDURE — 85049 AUTOMATED PLATELET COUNT: CPT | Performed by: COLON & RECTAL SURGERY

## 2019-03-29 PROCEDURE — 36415 COLL VENOUS BLD VENIPUNCTURE: CPT | Performed by: COLON & RECTAL SURGERY

## 2019-03-29 PROCEDURE — 80048 BASIC METABOLIC PNL TOTAL CA: CPT | Performed by: COLON & RECTAL SURGERY

## 2019-03-29 PROCEDURE — 12000000 ZZH R&B MED SURG/OB

## 2019-03-29 RX ORDER — KETOROLAC TROMETHAMINE 15 MG/ML
15 INJECTION, SOLUTION INTRAMUSCULAR; INTRAVENOUS EVERY 6 HOURS PRN
Status: DISPENSED | OUTPATIENT
Start: 2019-03-29 | End: 2019-04-03

## 2019-03-29 RX ADMIN — CALCIUM CARBONATE (ANTACID) CHEW TAB 500 MG 1000 MG: 500 CHEW TAB at 05:53

## 2019-03-29 RX ADMIN — KETOROLAC TROMETHAMINE 15 MG: 15 INJECTION, SOLUTION INTRAMUSCULAR; INTRAVENOUS at 17:52

## 2019-03-29 RX ADMIN — ALVIMOPAN 12 MG: 12 CAPSULE ORAL at 11:46

## 2019-03-29 RX ADMIN — ENOXAPARIN SODIUM 40 MG: 40 INJECTION SUBCUTANEOUS at 11:48

## 2019-03-29 RX ADMIN — SODIUM CHLORIDE, POTASSIUM CHLORIDE, SODIUM LACTATE AND CALCIUM CHLORIDE: 600; 310; 30; 20 INJECTION, SOLUTION INTRAVENOUS at 21:02

## 2019-03-29 RX ADMIN — ONDANSETRON 4 MG: 2 INJECTION INTRAMUSCULAR; INTRAVENOUS at 00:53

## 2019-03-29 RX ADMIN — ONDANSETRON 4 MG: 2 INJECTION INTRAMUSCULAR; INTRAVENOUS at 07:38

## 2019-03-29 ASSESSMENT — ACTIVITIES OF DAILY LIVING (ADL)
ADLS_ACUITY_SCORE: 13

## 2019-03-29 NOTE — PLAN OF CARE
Ambulatory Status:  Pt up standby/ind.  Orientation: a/o  VS:  Tmax 100.9, scheduled tylenol given, last temp 100.2. HR tachy   Pain:  minimal abd -PCA in place.   Resp: LS clear. Encouraged IS   GI:  intermittent nausea-IV Zofran given. C/o heartburn No appetite, on full liquid diet. Faint BS.  Denies flatus.  No BM since surgery.   : voiding without difficulty   Skin:  bruising/redness around midline incision   Labs:  triggered sepsis; lactic 1.2  Disposition:  tbd

## 2019-03-29 NOTE — PLAN OF CARE
A&Ox4, VSS, 94% sats on RA. Small emesis this am, relief after IV Zofran.  Up in chair and ambulating in halls frequently with spouse, steady gait, denies dizziness.  Reports gas pains and intermittent abdominal cramping. Has not used PCA. Passing flatus and has had 2 loose stools.  Reports decrease in abdominal bloating sensation.  NPO except ice chips/meds.  Continue with plan of care, continue to monitor.

## 2019-03-29 NOTE — PROGRESS NOTES
Pt is alert and oriented, low grade Temp.,lung sounds clear.Up with SBA.On full liquid diet. +ve bowel sounds, -ve flatus. Pt had x2 episodes of emesis, Zofran given and Tums for heart burn. Distended stomach. IV infusing at 50ml/hr.Denies pain has a PCA pump if needed and prn Tylenol.Voiding using a urinal.Incission to the stomach left to air. Will continue to monitor.

## 2019-03-29 NOTE — PROGRESS NOTES
COLON & RECTAL SURGERY  PROGRESS NOTE    March 29, 2019  Post-op Day #3 s/p exploratory laparoscopy & open colostomy takedown.       SUBJECTIVE:  Patient had temp of 100.9 last night and improved to 99.3 this morning.  Emesis x3 overnight.  Patient complains of heartburn and a more distended abdomen.  He is only taking ice chips intermittently now.  He states he is feeling much better this morning compared to last night.  Has been out of bed x4 yesterday and up in chair this morning.  Has not passed gas or stool yet. Rarely using PCA pump for pain.    OBJECTIVE:  Temp:  [98.4  F (36.9  C)-100.9  F (38.3  C)] 99.3  F (37.4  C)  Heart Rate:  [] 104  Resp:  [22-32] 22  BP: (123-147)/(73-88) 147/88  SpO2:  [92 %-96 %] 94 %    Intake/Output Summary (Last 24 hours) at 3/29/2019 0940  Last data filed at 3/29/2019 0730  Gross per 24 hour   Intake 801 ml   Output 450 ml   Net 351 ml       GENERAL:  Awake, alert, no acute distress, sitting up in chair  HEAD: Nomocephalic atraumatic  SCLERA: anicteric  EXTREMITIES: warm and well perfused  ABDOMEN:  Soft, minimal appropriate tenderness, slightly more distended, no rebound or guarding, no peritoneal signs.  Old stoma site with dressing in place.  INCISION:  C/d/i with dermabond.  No sign of infection.    LABS:  Lab Results   Component Value Date    WBC 9.4 03/29/2019     Lab Results   Component Value Date    HGB 13.8 03/29/2019     Lab Results   Component Value Date    HCT 42.3 03/29/2019     Lab Results   Component Value Date     03/29/2019     Last Basic Metabolic Panel:  Lab Results   Component Value Date     03/29/2019      Lab Results   Component Value Date    POTASSIUM 3.6 03/29/2019     Lab Results   Component Value Date    CHLORIDE 104 03/29/2019     Lab Results   Component Value Date    JANICE 8.9 03/29/2019     Lab Results   Component Value Date    CO2 26 03/29/2019     Lab Results   Component Value Date    BUN 12 03/29/2019     Lab Results   Component  Value Date    CR 0.74 03/29/2019     Lab Results   Component Value Date    GLC 97 03/29/2019       ASSESSMENT/PLAN: 58 y.o M POD# 3 s/p exploratory laparoscopy & open colostomy takedown. Now having symptoms of ileus. Temp to 100.9 overnight, improved to 99.3 this morning.  Tachy to 104.  BMP wnl.  WBC 9.4, lactate 1.2.      1. NPO for now, if still nauseous and vomiting by this afternoon will need NG tube.  2. Will increase IV fluids to 100 ml/hr as not tolerating PO.  3. Will keep IV pain meds for now as not tolerating diet.  4. Encourage OOB  5. Continue Lovenox.  6. Daily dressing change to old stoma site.        For questions/paging, please contact the CRS office at [phone].    Lida Rodrigues PA-C  [facility]  Phone: [phone]    Colon and Rectal Surgery Staff  I performed a history and physical examination of the patient and discussed their management with the physician assistant. I reviewed the physician assistants note and agree with the documented findings and plan of care.     POD# 3 s/p exploratory laparoscopy and colostomy closure.  Tachycardic and low grade fever overnight.  Inc nausea and emesis x3.  No flatus or BMs.  Likely ileus, labs all wnl this am.  Discussed NPO and placement of NGT with patient this am.  Patient would like to avoid NGT and feels like things are getting better this am.  Will reevaluate this afternoon.  If ongoing nausea, emesis then will proceed with NGT placement.      Lillian Acevedo MD  [facility]  Office Phone # [phone].

## 2019-03-30 PROCEDURE — 12000000 ZZH R&B MED SURG/OB

## 2019-03-30 PROCEDURE — 25000128 H RX IP 250 OP 636: Performed by: COLON & RECTAL SURGERY

## 2019-03-30 PROCEDURE — 25800030 ZZH RX IP 258 OP 636: Performed by: PHYSICIAN ASSISTANT

## 2019-03-30 RX ORDER — OXYCODONE HYDROCHLORIDE 5 MG/1
5 TABLET ORAL EVERY 6 HOURS PRN
Status: DISCONTINUED | OUTPATIENT
Start: 2019-03-30 | End: 2019-04-04

## 2019-03-30 RX ORDER — OXYCODONE HYDROCHLORIDE 5 MG/1
5 TABLET ORAL EVERY 6 HOURS PRN
Qty: 20 TABLET | Refills: 0 | Status: SHIPPED | OUTPATIENT
Start: 2019-03-30 | End: 2019-04-12

## 2019-03-30 RX ADMIN — KETOROLAC TROMETHAMINE 15 MG: 15 INJECTION, SOLUTION INTRAMUSCULAR; INTRAVENOUS at 16:26

## 2019-03-30 RX ADMIN — KETOROLAC TROMETHAMINE 15 MG: 15 INJECTION, SOLUTION INTRAMUSCULAR; INTRAVENOUS at 22:37

## 2019-03-30 RX ADMIN — KETOROLAC TROMETHAMINE 15 MG: 15 INJECTION, SOLUTION INTRAMUSCULAR; INTRAVENOUS at 09:07

## 2019-03-30 RX ADMIN — SODIUM CHLORIDE, POTASSIUM CHLORIDE, SODIUM LACTATE AND CALCIUM CHLORIDE: 600; 310; 30; 20 INJECTION, SOLUTION INTRAVENOUS at 07:07

## 2019-03-30 RX ADMIN — ENOXAPARIN SODIUM 40 MG: 40 INJECTION SUBCUTANEOUS at 11:14

## 2019-03-30 ASSESSMENT — ACTIVITIES OF DAILY LIVING (ADL)
ADLS_ACUITY_SCORE: 13

## 2019-03-30 NOTE — PROGRESS NOTES
COLON & RECTAL SURGERY  PROGRESS NOTE    March 30, 2019  Post-op Day # 4 colostomy takedown    SUBJECTIVE:  Doing well.  Passing gas and stool.  Minimal appetite. No nausea.  Minimal pain (toradol helps a lot)    OBJECTIVE:  Temp:  [97.3  F (36.3  C)-99.7  F (37.6  C)] 97.5  F (36.4  C)  Heart Rate:  [] 89  Resp:  [20] 20  BP: (137-140)/(73-80) 137/80  SpO2:  [94 %-97 %] 94 %    Intake/Output Summary (Last 24 hours) at 3/30/2019 0941  Last data filed at 3/30/2019 0900  Gross per 24 hour   Intake 660 ml   Output --   Net 660 ml       GENERAL:  Awake, alert, no acute distress  ABDOMEN:  Soft, appropriately tender, non-distended  INCISION:  C/d/i, stoma site dressing clean    ASSESSMENT/PLAN:POD#4 colostomy takedown.  With return of bowel function  1. Full liquid diet today and likely advance tomorrow if still having good bowel function  2. toradol and tylenol prn for pain control.  Stop PCA.  Minimize narcotics as able  3. Encourage ambulation  4. Lovenox. Will need teaching for discharge.  5. Teach patient how to do dressing change.  Ok to shower  6. Possible discharge tomorrow or Monday.    Joann Mayorga MD  [facility]  Pager:  [phone]  Phone: [phone]  Fax: [phone]

## 2019-03-30 NOTE — PLAN OF CARE
Patient resting comfortably. Denies pain/discomfort. Alert/oriented x4. Up in room independently. Patient reports multiple bowel movements this shift. Bowel sounds positive. Positive for flatus. Incision intact. Voiding without difficulty. Continues NPO with ice chips. Discharge 2-3 days.

## 2019-03-30 NOTE — PLAN OF CARE
A&Ox4, VSS, Temp max 99.5   Using incentive spirometer.  Abdominal pain managed with prn IV Toradol x2.  Abdominal incision with liquid bandage.  Dressing change done, patient and spouse observed dressing/wound care, patient would like to perform tomorrows dressing change with nurse supervision.  Lovenox teaching done, spouse administered morning dose of Lovenox without difficulty.  Both patient and spouse stated good understanding of medication and administration, given informational handout.  Ambulating in halls frequently, up in chair.  Continues with loose stools, voiding without difficulty.  Denies nausea.  Tolerating full liquid diet.  Plan for advancement of diet tomorrow, discharge home in 1-2 days.  Continue with plan of care.

## 2019-03-31 LAB
BASOPHILS # BLD AUTO: 0 10E9/L (ref 0–0.2)
BASOPHILS NFR BLD AUTO: 0.2 %
DIFFERENTIAL METHOD BLD: ABNORMAL
EOSINOPHIL # BLD AUTO: 0.1 10E9/L (ref 0–0.7)
EOSINOPHIL NFR BLD AUTO: 0.8 %
ERYTHROCYTE [DISTWIDTH] IN BLOOD BY AUTOMATED COUNT: 13.6 % (ref 10–15)
HCT VFR BLD AUTO: 43.2 % (ref 40–53)
HGB BLD-MCNC: 13.6 G/DL (ref 13.3–17.7)
IMM GRANULOCYTES # BLD: 0 10E9/L (ref 0–0.4)
IMM GRANULOCYTES NFR BLD: 0.2 %
LACTATE BLD-SCNC: 1.9 MMOL/L (ref 0.7–2)
LYMPHOCYTES # BLD AUTO: 0.7 10E9/L (ref 0.8–5.3)
LYMPHOCYTES NFR BLD AUTO: 7.4 %
MCH RBC QN AUTO: 33.7 PG (ref 26.5–33)
MCHC RBC AUTO-ENTMCNC: 31.5 G/DL (ref 31.5–36.5)
MCV RBC AUTO: 107 FL (ref 78–100)
MONOCYTES # BLD AUTO: 0.6 10E9/L (ref 0–1.3)
MONOCYTES NFR BLD AUTO: 6.4 %
NEUTROPHILS # BLD AUTO: 7.6 10E9/L (ref 1.6–8.3)
NEUTROPHILS NFR BLD AUTO: 85 %
NRBC # BLD AUTO: 0 10*3/UL
NRBC BLD AUTO-RTO: 0 /100
PLATELET # BLD AUTO: 171 10E9/L (ref 150–450)
RBC # BLD AUTO: 4.04 10E12/L (ref 4.4–5.9)
WBC # BLD AUTO: 9 10E9/L (ref 4–11)

## 2019-03-31 PROCEDURE — 83605 ASSAY OF LACTIC ACID: CPT | Performed by: COLON & RECTAL SURGERY

## 2019-03-31 PROCEDURE — 12000000 ZZH R&B MED SURG/OB

## 2019-03-31 PROCEDURE — 36415 COLL VENOUS BLD VENIPUNCTURE: CPT | Performed by: COLON & RECTAL SURGERY

## 2019-03-31 PROCEDURE — 25000132 ZZH RX MED GY IP 250 OP 250 PS 637: Performed by: COLON & RECTAL SURGERY

## 2019-03-31 PROCEDURE — 85025 COMPLETE CBC W/AUTO DIFF WBC: CPT | Performed by: COLON & RECTAL SURGERY

## 2019-03-31 PROCEDURE — 25000128 H RX IP 250 OP 636: Performed by: COLON & RECTAL SURGERY

## 2019-03-31 RX ADMIN — KETOROLAC TROMETHAMINE 15 MG: 15 INJECTION, SOLUTION INTRAMUSCULAR; INTRAVENOUS at 08:38

## 2019-03-31 RX ADMIN — OXYCODONE HYDROCHLORIDE 5 MG: 5 TABLET ORAL at 09:38

## 2019-03-31 RX ADMIN — ACETAMINOPHEN 650 MG: 325 TABLET, FILM COATED ORAL at 23:51

## 2019-03-31 RX ADMIN — KETOROLAC TROMETHAMINE 15 MG: 15 INJECTION, SOLUTION INTRAMUSCULAR; INTRAVENOUS at 19:48

## 2019-03-31 RX ADMIN — ACETAMINOPHEN 650 MG: 325 TABLET, FILM COATED ORAL at 11:35

## 2019-03-31 RX ADMIN — ACETAMINOPHEN 650 MG: 325 TABLET, FILM COATED ORAL at 18:11

## 2019-03-31 RX ADMIN — ENOXAPARIN SODIUM 40 MG: 40 INJECTION SUBCUTANEOUS at 11:35

## 2019-03-31 ASSESSMENT — ACTIVITIES OF DAILY LIVING (ADL)
ADLS_ACUITY_SCORE: 13

## 2019-03-31 NOTE — PLAN OF CARE
Assumed care at 1900. Denies pain/discomfort. Alert/oriented x4. Up independent. Ambulating the halls with wife. Complaining of feeling distended, but is passing gas, has positive bowel sounds and has had a few BMs today. Encouraged patient to continue ambulating, and notify nursing if increased pain or discomfort.

## 2019-03-31 NOTE — PLAN OF CARE
"Temp max 101.9.  Dr. Mayorga updated, no new orders, continue to monitor.  Recheck temp 101.7, tylenol given.  HR slightly tachy 100-110's.  Sats 98% RA.  Abdominal and right/left sided pain managed with IV Toradol prn, heating pad.  Positive bowel sounds/flatus.  BM x2, patient reports \"slightly formed\".  Abdominal distended.  Dressing change done by nurse, patient declined due to feeling \"too tired\".  Plan for patient to perform dressing tomorrow. Urine output anant, patient now saving for nurse to assess.  Taking good PO fluids, appetite poor. Diet advanced to Low Fiber, given educational handouts re low fiber diet.  Ambulated in halls, up in chair.  Will continue to monitor, notify MD as needed.  "

## 2019-03-31 NOTE — PLAN OF CARE
POD 5- Pt's pain minimal during shift. BS active. Passing flatus. BM noted 3\30\19. Tolerating full liquid diet. Midline inc CDI PRAVIN with liquid bandage. Dressing with packing CDI. Up independently in room. Wife at bedside. Plans to likely discharge today.

## 2019-03-31 NOTE — PROGRESS NOTES
COLON & RECTAL SURGERY  PROGRESS NOTE    March 31, 2019  Post-op Day # 5 colostomy takedown    SUBJECTIVE:  Had an episode of feeling feverish and shaky this morning.  This has resolved and he feels much better.  But temp still >100.  Denies nausea.  Tolerating liquids and some solid food.  Continues to pass flatus and stool.  Denies chest pain or shortness of breath now.    OBJECTIVE:  Temp:  [98.3  F (36.8  C)-101.7  F (38.7  C)] 100.6  F (38.1  C)  Heart Rate:  [] 102  Resp:  [16-28] 28  BP: (128-147)/(72-88) 141/75  SpO2:  [93 %-98 %] 98 %  No intake or output data in the 24 hours ending 03/31/19 1333    GENERAL:  Awake, alert, no acute distress  ABDOMEN:  Soft, appropriately tender, non-distended  INCISION:  C/d/i, dressing to be changed later today    LABS:  Lab Results   Component Value Date    WBC 9.0 03/31/2019     Lab Results   Component Value Date    HGB 13.6 03/31/2019     Lab Results   Component Value Date    HCT 43.2 03/31/2019     Lab Results   Component Value Date     03/31/2019     Last Basic Metabolic Panel:  Lab Results   Component Value Date     03/29/2019      Lab Results   Component Value Date    POTASSIUM 3.6 03/29/2019     Lab Results   Component Value Date    CHLORIDE 104 03/29/2019     Lab Results   Component Value Date    JANICE 8.9 03/29/2019     Lab Results   Component Value Date    CO2 26 03/29/2019     Lab Results   Component Value Date    BUN 12 03/29/2019     Lab Results   Component Value Date    CR 0.74 03/29/2019     Lab Results   Component Value Date    GLC 97 03/29/2019       ASSESSMENT/PLAN: POD#5 colostomy takedown  1. Low fiber diet and encourage fluids  2. Encourage ambulation  3. Tylenol and toradol for pain control.  Oxycodone if additional pain meds needed but patient would like to avoid if able  4. Plan to keep in hospital today given episode this morning with increased temp and HR.  If no further episodes then likely discharge tomorrow.  If more episodes  occur today then will consider restarting IVF, checking morning labs and re-evaluate for  Underlying cause tomorrow.   5. lovenox for dvt prophylaxis and will need for discharge    Joann Mayorga MD  [facility]  Pager:  [phone]  Phone: [phone]  Fax: [phone]

## 2019-03-31 NOTE — PROVIDER NOTIFICATION
Called to patients room by spouse with patient c/o sudden onset of chills, shaking.  Axillary temp 99.5, , RR 22-28, /84, 97% sats on RA. C/o slight shortness of breath, O2 on2L/NC for comfort.  MD (Dr. Casanova, on call) notified.  MD will come to see patient.

## 2019-03-31 NOTE — PROVIDER NOTIFICATION
"Oral temp 101.9, RR 24 other VSS, states he feels \"tired\".  Voiding without difficulty.  PO fluids encouraged.  Dr. Mayorga updated on temp.  Tylenol prn, no new orders at this time.  Continue to monitor.  MD will reevaluate in a.m.  Patient and spouse updated.    "

## 2019-04-01 ENCOUNTER — ANESTHESIA (OUTPATIENT)
Dept: SURGERY | Facility: CLINIC | Age: 59
DRG: 329 | End: 2019-04-01
Payer: COMMERCIAL

## 2019-04-01 ENCOUNTER — APPOINTMENT (OUTPATIENT)
Dept: CT IMAGING | Facility: CLINIC | Age: 59
DRG: 329 | End: 2019-04-01
Attending: COLON & RECTAL SURGERY
Payer: COMMERCIAL

## 2019-04-01 ENCOUNTER — ANESTHESIA EVENT (OUTPATIENT)
Dept: SURGERY | Facility: CLINIC | Age: 59
DRG: 329 | End: 2019-04-01
Payer: COMMERCIAL

## 2019-04-01 PROBLEM — K91.89 LARGE BOWEL ANASTOMOTIC LEAK: Status: ACTIVE | Noted: 2019-04-01

## 2019-04-01 LAB
ABO + RH BLD: NORMAL
ABO + RH BLD: NORMAL
ALBUMIN UR-MCNC: 70 MG/DL
ANION GAP SERPL CALCULATED.3IONS-SCNC: 6 MMOL/L (ref 3–14)
APPEARANCE UR: ABNORMAL
BACTERIA #/AREA URNS HPF: ABNORMAL /HPF
BILIRUB UR QL STRIP: NEGATIVE
BLD GP AB SCN SERPL QL: NORMAL
BLOOD BANK CMNT PATIENT-IMP: NORMAL
BUN SERPL-MCNC: 17 MG/DL (ref 7–30)
C DIFF TOX B STL QL: NEGATIVE
CALCIUM SERPL-MCNC: 8.6 MG/DL (ref 8.5–10.1)
CHLORIDE SERPL-SCNC: 106 MMOL/L (ref 94–109)
CO2 SERPL-SCNC: 26 MMOL/L (ref 20–32)
COLOR UR AUTO: ABNORMAL
CREAT SERPL-MCNC: 0.77 MG/DL (ref 0.66–1.25)
CREAT SERPL-MCNC: 0.85 MG/DL (ref 0.66–1.25)
ERYTHROCYTE [DISTWIDTH] IN BLOOD BY AUTOMATED COUNT: 13.8 % (ref 10–15)
GFR SERPL CREATININE-BSD FRML MDRD: >90 ML/MIN/{1.73_M2}
GFR SERPL CREATININE-BSD FRML MDRD: >90 ML/MIN/{1.73_M2}
GLUCOSE SERPL-MCNC: 118 MG/DL (ref 70–99)
GLUCOSE UR STRIP-MCNC: NEGATIVE MG/DL
HCT VFR BLD AUTO: 38.4 % (ref 40–53)
HGB BLD-MCNC: 12.8 G/DL (ref 13.3–17.7)
HGB UR QL STRIP: NEGATIVE
KETONES UR STRIP-MCNC: 10 MG/DL
LEUKOCYTE ESTERASE UR QL STRIP: NEGATIVE
MAGNESIUM SERPL-MCNC: 1.7 MG/DL (ref 1.6–2.3)
MCH RBC QN AUTO: 34 PG (ref 26.5–33)
MCHC RBC AUTO-ENTMCNC: 33.3 G/DL (ref 31.5–36.5)
MCV RBC AUTO: 102 FL (ref 78–100)
MUCOUS THREADS #/AREA URNS LPF: PRESENT /LPF
NITRATE UR QL: NEGATIVE
PH UR STRIP: 5.5 PH (ref 5–7)
PLATELET # BLD AUTO: 169 10E9/L (ref 150–450)
PLATELET # BLD AUTO: 178 10E9/L (ref 150–450)
POTASSIUM SERPL-SCNC: 3.4 MMOL/L (ref 3.4–5.3)
RBC # BLD AUTO: 3.77 10E12/L (ref 4.4–5.9)
RBC #/AREA URNS AUTO: 3 /HPF (ref 0–2)
SODIUM SERPL-SCNC: 138 MMOL/L (ref 133–144)
SOURCE: ABNORMAL
SP GR UR STRIP: 1.03 (ref 1–1.03)
SPECIMEN EXP DATE BLD: NORMAL
SPECIMEN SOURCE: NORMAL
SQUAMOUS #/AREA URNS AUTO: <1 /HPF (ref 0–1)
UROBILINOGEN UR STRIP-MCNC: NORMAL MG/DL (ref 0–2)
WBC # BLD AUTO: 10 10E9/L (ref 4–11)
WBC #/AREA URNS AUTO: 5 /HPF (ref 0–5)

## 2019-04-01 PROCEDURE — 74177 CT ABD & PELVIS W/CONTRAST: CPT

## 2019-04-01 PROCEDURE — 25000125 ZZHC RX 250: Performed by: COLON & RECTAL SURGERY

## 2019-04-01 PROCEDURE — 37000008 ZZH ANESTHESIA TECHNICAL FEE, 1ST 30 MIN: Performed by: COLON & RECTAL SURGERY

## 2019-04-01 PROCEDURE — 25000128 H RX IP 250 OP 636: Performed by: COLON & RECTAL SURGERY

## 2019-04-01 PROCEDURE — 86850 RBC ANTIBODY SCREEN: CPT | Performed by: ANESTHESIOLOGY

## 2019-04-01 PROCEDURE — 88304 TISSUE EXAM BY PATHOLOGIST: CPT | Performed by: COLON & RECTAL SURGERY

## 2019-04-01 PROCEDURE — 86901 BLOOD TYPING SEROLOGIC RH(D): CPT | Performed by: ANESTHESIOLOGY

## 2019-04-01 PROCEDURE — 25000128 H RX IP 250 OP 636: Performed by: NURSE ANESTHETIST, CERTIFIED REGISTERED

## 2019-04-01 PROCEDURE — 25000132 ZZH RX MED GY IP 250 OP 250 PS 637: Performed by: COLON & RECTAL SURGERY

## 2019-04-01 PROCEDURE — 85027 COMPLETE CBC AUTOMATED: CPT | Performed by: COLON & RECTAL SURGERY

## 2019-04-01 PROCEDURE — 87493 C DIFF AMPLIFIED PROBE: CPT | Performed by: COLON & RECTAL SURGERY

## 2019-04-01 PROCEDURE — 25800030 ZZH RX IP 258 OP 636: Performed by: ANESTHESIOLOGY

## 2019-04-01 PROCEDURE — 80048 BASIC METABOLIC PNL TOTAL CA: CPT | Performed by: COLON & RECTAL SURGERY

## 2019-04-01 PROCEDURE — 25800030 ZZH RX IP 258 OP 636: Performed by: NURSE ANESTHETIST, CERTIFIED REGISTERED

## 2019-04-01 PROCEDURE — 0DBL0ZZ EXCISION OF TRANSVERSE COLON, OPEN APPROACH: ICD-10-PCS | Performed by: COLON & RECTAL SURGERY

## 2019-04-01 PROCEDURE — 36415 COLL VENOUS BLD VENIPUNCTURE: CPT | Performed by: COLON & RECTAL SURGERY

## 2019-04-01 PROCEDURE — 71000012 ZZH RECOVERY PHASE 1 LEVEL 1 FIRST HR: Performed by: COLON & RECTAL SURGERY

## 2019-04-01 PROCEDURE — 81001 URINALYSIS AUTO W/SCOPE: CPT | Performed by: COLON & RECTAL SURGERY

## 2019-04-01 PROCEDURE — 71000013 ZZH RECOVERY PHASE 1 LEVEL 1 EA ADDTL HR: Performed by: COLON & RECTAL SURGERY

## 2019-04-01 PROCEDURE — 25000128 H RX IP 250 OP 636: Performed by: ANESTHESIOLOGY

## 2019-04-01 PROCEDURE — 83735 ASSAY OF MAGNESIUM: CPT | Performed by: COLON & RECTAL SURGERY

## 2019-04-01 PROCEDURE — 25000125 ZZHC RX 250: Performed by: NURSE ANESTHETIST, CERTIFIED REGISTERED

## 2019-04-01 PROCEDURE — 86900 BLOOD TYPING SEROLOGIC ABO: CPT | Performed by: ANESTHESIOLOGY

## 2019-04-01 PROCEDURE — 0D1L0Z4 BYPASS TRANSVERSE COLON TO CUTANEOUS, OPEN APPROACH: ICD-10-PCS | Performed by: COLON & RECTAL SURGERY

## 2019-04-01 PROCEDURE — 36000093 ZZH SURGERY LEVEL 4 1ST 30 MIN: Performed by: COLON & RECTAL SURGERY

## 2019-04-01 PROCEDURE — 82565 ASSAY OF CREATININE: CPT | Performed by: COLON & RECTAL SURGERY

## 2019-04-01 PROCEDURE — 88305 TISSUE EXAM BY PATHOLOGIST: CPT | Mod: 26 | Performed by: COLON & RECTAL SURGERY

## 2019-04-01 PROCEDURE — 12000000 ZZH R&B MED SURG/OB

## 2019-04-01 PROCEDURE — 36000056 ZZH SURGERY LEVEL 3 1ST 30 MIN: Performed by: COLON & RECTAL SURGERY

## 2019-04-01 PROCEDURE — 85049 AUTOMATED PLATELET COUNT: CPT | Performed by: COLON & RECTAL SURGERY

## 2019-04-01 PROCEDURE — 40000306 ZZH STATISTIC PRE PROC ASSESS II: Performed by: COLON & RECTAL SURGERY

## 2019-04-01 PROCEDURE — 37000009 ZZH ANESTHESIA TECHNICAL FEE, EACH ADDTL 15 MIN: Performed by: COLON & RECTAL SURGERY

## 2019-04-01 PROCEDURE — 36000058 ZZH SURGERY LEVEL 3 EA 15 ADDTL MIN: Performed by: COLON & RECTAL SURGERY

## 2019-04-01 PROCEDURE — 36000063 ZZH SURGERY LEVEL 4 EA 15 ADDTL MIN: Performed by: COLON & RECTAL SURGERY

## 2019-04-01 PROCEDURE — 0DBU0ZZ EXCISION OF OMENTUM, OPEN APPROACH: ICD-10-PCS | Performed by: COLON & RECTAL SURGERY

## 2019-04-01 PROCEDURE — 25800030 ZZH RX IP 258 OP 636: Performed by: COLON & RECTAL SURGERY

## 2019-04-01 PROCEDURE — 88305 TISSUE EXAM BY PATHOLOGIST: CPT | Performed by: COLON & RECTAL SURGERY

## 2019-04-01 PROCEDURE — 88304 TISSUE EXAM BY PATHOLOGIST: CPT | Mod: 26 | Performed by: COLON & RECTAL SURGERY

## 2019-04-01 PROCEDURE — 36415 COLL VENOUS BLD VENIPUNCTURE: CPT | Performed by: ANESTHESIOLOGY

## 2019-04-01 PROCEDURE — 27210794 ZZH OR GENERAL SUPPLY STERILE: Performed by: COLON & RECTAL SURGERY

## 2019-04-01 RX ORDER — IOPAMIDOL 755 MG/ML
100 INJECTION, SOLUTION INTRAVASCULAR ONCE
Status: COMPLETED | OUTPATIENT
Start: 2019-04-01 | End: 2019-04-01

## 2019-04-01 RX ORDER — CIPROFLOXACIN 2 MG/ML
400 INJECTION, SOLUTION INTRAVENOUS EVERY 12 HOURS
Status: DISCONTINUED | OUTPATIENT
Start: 2019-04-02 | End: 2019-04-12 | Stop reason: HOSPADM

## 2019-04-01 RX ORDER — SODIUM CHLORIDE, SODIUM LACTATE, POTASSIUM CHLORIDE, CALCIUM CHLORIDE 600; 310; 30; 20 MG/100ML; MG/100ML; MG/100ML; MG/100ML
INJECTION, SOLUTION INTRAVENOUS CONTINUOUS
Status: DISCONTINUED | OUTPATIENT
Start: 2019-04-01 | End: 2019-04-12 | Stop reason: HOSPADM

## 2019-04-01 RX ORDER — ONDANSETRON 2 MG/ML
4 INJECTION INTRAMUSCULAR; INTRAVENOUS EVERY 30 MIN PRN
Status: DISCONTINUED | OUTPATIENT
Start: 2019-04-01 | End: 2019-04-01 | Stop reason: HOSPADM

## 2019-04-01 RX ORDER — HYDROMORPHONE HYDROCHLORIDE 1 MG/ML
.3-.5 INJECTION, SOLUTION INTRAMUSCULAR; INTRAVENOUS; SUBCUTANEOUS EVERY 10 MIN PRN
Status: DISCONTINUED | OUTPATIENT
Start: 2019-04-01 | End: 2019-04-01 | Stop reason: HOSPADM

## 2019-04-01 RX ORDER — SODIUM CHLORIDE, SODIUM LACTATE, POTASSIUM CHLORIDE, CALCIUM CHLORIDE 600; 310; 30; 20 MG/100ML; MG/100ML; MG/100ML; MG/100ML
INJECTION, SOLUTION INTRAVENOUS CONTINUOUS PRN
Status: DISCONTINUED | OUTPATIENT
Start: 2019-04-01 | End: 2019-04-01

## 2019-04-01 RX ORDER — ACETAMINOPHEN 325 MG/1
975 TABLET ORAL EVERY 8 HOURS
Status: DISPENSED | OUTPATIENT
Start: 2019-04-01 | End: 2019-04-04

## 2019-04-01 RX ORDER — PROPOFOL 10 MG/ML
INJECTION, EMULSION INTRAVENOUS PRN
Status: DISCONTINUED | OUTPATIENT
Start: 2019-04-01 | End: 2019-04-01

## 2019-04-01 RX ORDER — METOPROLOL TARTRATE 1 MG/ML
1-2 INJECTION, SOLUTION INTRAVENOUS EVERY 5 MIN PRN
Status: DISCONTINUED | OUTPATIENT
Start: 2019-04-01 | End: 2019-04-01 | Stop reason: HOSPADM

## 2019-04-01 RX ORDER — MAGNESIUM HYDROXIDE 1200 MG/15ML
LIQUID ORAL PRN
Status: DISCONTINUED | OUTPATIENT
Start: 2019-04-01 | End: 2019-04-01 | Stop reason: HOSPADM

## 2019-04-01 RX ORDER — MEPERIDINE HYDROCHLORIDE 25 MG/ML
12.5 INJECTION INTRAMUSCULAR; INTRAVENOUS; SUBCUTANEOUS
Status: DISCONTINUED | OUTPATIENT
Start: 2019-04-01 | End: 2019-04-01 | Stop reason: HOSPADM

## 2019-04-01 RX ORDER — ONDANSETRON 4 MG/1
4 TABLET, ORALLY DISINTEGRATING ORAL EVERY 30 MIN PRN
Status: DISCONTINUED | OUTPATIENT
Start: 2019-04-01 | End: 2019-04-01 | Stop reason: HOSPADM

## 2019-04-01 RX ORDER — OXYCODONE HYDROCHLORIDE 5 MG/1
5 TABLET ORAL EVERY 4 HOURS PRN
Status: DISCONTINUED | OUTPATIENT
Start: 2019-04-01 | End: 2019-04-09

## 2019-04-01 RX ORDER — CIPROFLOXACIN 2 MG/ML
400 INJECTION, SOLUTION INTRAVENOUS EVERY 12 HOURS
Status: DISCONTINUED | OUTPATIENT
Start: 2019-04-01 | End: 2019-04-01

## 2019-04-01 RX ORDER — CIPROFLOXACIN 2 MG/ML
400 INJECTION, SOLUTION INTRAVENOUS SEE ADMIN INSTRUCTIONS
Status: DISCONTINUED | OUTPATIENT
Start: 2019-04-01 | End: 2019-04-01 | Stop reason: HOSPADM

## 2019-04-01 RX ORDER — ONDANSETRON 2 MG/ML
4 INJECTION INTRAMUSCULAR; INTRAVENOUS EVERY 6 HOURS PRN
Status: DISCONTINUED | OUTPATIENT
Start: 2019-04-01 | End: 2019-04-12 | Stop reason: HOSPADM

## 2019-04-01 RX ORDER — DIPHENHYDRAMINE HYDROCHLORIDE 50 MG/ML
25 INJECTION INTRAMUSCULAR; INTRAVENOUS EVERY 6 HOURS PRN
Status: DISCONTINUED | OUTPATIENT
Start: 2019-04-01 | End: 2019-04-12 | Stop reason: HOSPADM

## 2019-04-01 RX ORDER — SODIUM CHLORIDE, SODIUM LACTATE, POTASSIUM CHLORIDE, CALCIUM CHLORIDE 600; 310; 30; 20 MG/100ML; MG/100ML; MG/100ML; MG/100ML
INJECTION, SOLUTION INTRAVENOUS CONTINUOUS
Status: DISCONTINUED | OUTPATIENT
Start: 2019-04-01 | End: 2019-04-01 | Stop reason: HOSPADM

## 2019-04-01 RX ORDER — FENTANYL CITRATE 50 UG/ML
25-50 INJECTION, SOLUTION INTRAMUSCULAR; INTRAVENOUS
Status: DISCONTINUED | OUTPATIENT
Start: 2019-04-01 | End: 2019-04-01 | Stop reason: HOSPADM

## 2019-04-01 RX ORDER — LIDOCAINE 40 MG/G
CREAM TOPICAL
Status: DISCONTINUED | OUTPATIENT
Start: 2019-04-01 | End: 2019-04-12 | Stop reason: HOSPADM

## 2019-04-01 RX ORDER — NEOSTIGMINE METHYLSULFATE 1 MG/ML
VIAL (ML) INJECTION PRN
Status: DISCONTINUED | OUTPATIENT
Start: 2019-04-01 | End: 2019-04-01

## 2019-04-01 RX ORDER — ALBUTEROL SULFATE 0.83 MG/ML
2.5 SOLUTION RESPIRATORY (INHALATION) EVERY 4 HOURS PRN
Status: DISCONTINUED | OUTPATIENT
Start: 2019-04-01 | End: 2019-04-01 | Stop reason: HOSPADM

## 2019-04-01 RX ORDER — NALOXONE HYDROCHLORIDE 0.4 MG/ML
.1-.4 INJECTION, SOLUTION INTRAMUSCULAR; INTRAVENOUS; SUBCUTANEOUS
Status: DISCONTINUED | OUTPATIENT
Start: 2019-04-01 | End: 2019-04-01 | Stop reason: HOSPADM

## 2019-04-01 RX ORDER — GLYCOPYRROLATE 0.2 MG/ML
INJECTION, SOLUTION INTRAMUSCULAR; INTRAVENOUS PRN
Status: DISCONTINUED | OUTPATIENT
Start: 2019-04-01 | End: 2019-04-01

## 2019-04-01 RX ORDER — HYDRALAZINE HYDROCHLORIDE 20 MG/ML
2.5-5 INJECTION INTRAMUSCULAR; INTRAVENOUS EVERY 10 MIN PRN
Status: DISCONTINUED | OUTPATIENT
Start: 2019-04-01 | End: 2019-04-01 | Stop reason: HOSPADM

## 2019-04-01 RX ORDER — ONDANSETRON 2 MG/ML
INJECTION INTRAMUSCULAR; INTRAVENOUS PRN
Status: DISCONTINUED | OUTPATIENT
Start: 2019-04-01 | End: 2019-04-01

## 2019-04-01 RX ORDER — CIPROFLOXACIN 2 MG/ML
400 INJECTION, SOLUTION INTRAVENOUS
Status: COMPLETED | OUTPATIENT
Start: 2019-04-01 | End: 2019-04-01

## 2019-04-01 RX ORDER — NALOXONE HYDROCHLORIDE 0.4 MG/ML
.1-.4 INJECTION, SOLUTION INTRAMUSCULAR; INTRAVENOUS; SUBCUTANEOUS
Status: DISCONTINUED | OUTPATIENT
Start: 2019-04-01 | End: 2019-04-12 | Stop reason: HOSPADM

## 2019-04-01 RX ORDER — DEXAMETHASONE SODIUM PHOSPHATE 4 MG/ML
INJECTION, SOLUTION INTRA-ARTICULAR; INTRALESIONAL; INTRAMUSCULAR; INTRAVENOUS; SOFT TISSUE PRN
Status: DISCONTINUED | OUTPATIENT
Start: 2019-04-01 | End: 2019-04-01

## 2019-04-01 RX ORDER — FENTANYL CITRATE 50 UG/ML
25-50 INJECTION, SOLUTION INTRAMUSCULAR; INTRAVENOUS EVERY 5 MIN PRN
Status: DISCONTINUED | OUTPATIENT
Start: 2019-04-01 | End: 2019-04-01 | Stop reason: HOSPADM

## 2019-04-01 RX ORDER — KETOROLAC TROMETHAMINE 30 MG/ML
30 INJECTION, SOLUTION INTRAMUSCULAR; INTRAVENOUS EVERY 6 HOURS PRN
Status: DISCONTINUED | OUTPATIENT
Start: 2019-04-01 | End: 2019-04-01 | Stop reason: HOSPADM

## 2019-04-01 RX ADMIN — MIDAZOLAM 2 MG: 1 INJECTION INTRAMUSCULAR; INTRAVENOUS at 16:00

## 2019-04-01 RX ADMIN — HYDROMORPHONE HYDROCHLORIDE 0.5 MG: 1 INJECTION, SOLUTION INTRAMUSCULAR; INTRAVENOUS; SUBCUTANEOUS at 16:23

## 2019-04-01 RX ADMIN — LIDOCAINE HYDROCHLORIDE 50 MG: 10 INJECTION, SOLUTION INFILTRATION; PERINEURAL at 16:07

## 2019-04-01 RX ADMIN — ONDANSETRON 4 MG: 2 INJECTION INTRAMUSCULAR; INTRAVENOUS at 17:38

## 2019-04-01 RX ADMIN — ROCURONIUM BROMIDE 50 MG: 10 INJECTION INTRAVENOUS at 16:09

## 2019-04-01 RX ADMIN — IOPAMIDOL 100 ML: 755 INJECTION, SOLUTION INTRAVENOUS at 10:57

## 2019-04-01 RX ADMIN — ROCURONIUM BROMIDE 15 MG: 10 INJECTION INTRAVENOUS at 16:29

## 2019-04-01 RX ADMIN — PROPOFOL 200 MG: 10 INJECTION, EMULSION INTRAVENOUS at 16:07

## 2019-04-01 RX ADMIN — CIPROFLOXACIN 400 MG: 2 INJECTION INTRAVENOUS at 16:36

## 2019-04-01 RX ADMIN — ROCURONIUM BROMIDE 5 MG: 10 INJECTION INTRAVENOUS at 17:25

## 2019-04-01 RX ADMIN — KETOROLAC TROMETHAMINE 15 MG: 15 INJECTION, SOLUTION INTRAMUSCULAR; INTRAVENOUS at 11:10

## 2019-04-01 RX ADMIN — KETOROLAC TROMETHAMINE 15 MG: 15 INJECTION, SOLUTION INTRAMUSCULAR; INTRAVENOUS at 05:06

## 2019-04-01 RX ADMIN — FENTANYL CITRATE 100 MCG: 50 INJECTION, SOLUTION INTRAMUSCULAR; INTRAVENOUS at 16:07

## 2019-04-01 RX ADMIN — GLYCOPYRROLATE 0.4 MG: 0.2 INJECTION, SOLUTION INTRAMUSCULAR; INTRAVENOUS at 18:28

## 2019-04-01 RX ADMIN — Medication 100 MG: at 16:07

## 2019-04-01 RX ADMIN — METRONIDAZOLE 500 MG: 500 INJECTION, SOLUTION INTRAVENOUS at 16:10

## 2019-04-01 RX ADMIN — PHENYLEPHRINE HYDROCHLORIDE 100 MCG: 10 INJECTION, SOLUTION INTRAMUSCULAR; INTRAVENOUS; SUBCUTANEOUS at 18:10

## 2019-04-01 RX ADMIN — HYDROMORPHONE HYDROCHLORIDE 0.5 MG: 1 INJECTION, SOLUTION INTRAMUSCULAR; INTRAVENOUS; SUBCUTANEOUS at 16:56

## 2019-04-01 RX ADMIN — SODIUM CHLORIDE, POTASSIUM CHLORIDE, SODIUM LACTATE AND CALCIUM CHLORIDE: 600; 310; 30; 20 INJECTION, SOLUTION INTRAVENOUS at 21:18

## 2019-04-01 RX ADMIN — SODIUM CHLORIDE, POTASSIUM CHLORIDE, SODIUM LACTATE AND CALCIUM CHLORIDE: 600; 310; 30; 20 INJECTION, SOLUTION INTRAVENOUS at 15:33

## 2019-04-01 RX ADMIN — METRONIDAZOLE 500 MG: 500 INJECTION, SOLUTION INTRAVENOUS at 09:28

## 2019-04-01 RX ADMIN — ACETAMINOPHEN 975 MG: 325 TABLET, FILM COATED ORAL at 21:15

## 2019-04-01 RX ADMIN — DEXAMETHASONE SODIUM PHOSPHATE 4 MG: 4 INJECTION, SOLUTION INTRA-ARTICULAR; INTRALESIONAL; INTRAMUSCULAR; INTRAVENOUS; SOFT TISSUE at 17:38

## 2019-04-01 RX ADMIN — Medication 3 MG: at 18:28

## 2019-04-01 RX ADMIN — Medication: at 19:09

## 2019-04-01 RX ADMIN — SODIUM CHLORIDE, POTASSIUM CHLORIDE, SODIUM LACTATE AND CALCIUM CHLORIDE: 600; 310; 30; 20 INJECTION, SOLUTION INTRAVENOUS at 19:50

## 2019-04-01 RX ADMIN — SODIUM CHLORIDE 65 ML: 9 INJECTION, SOLUTION INTRAVENOUS at 10:58

## 2019-04-01 RX ADMIN — DEXMEDETOMIDINE HYDROCHLORIDE 0.25 MCG/KG/HR: 100 INJECTION, SOLUTION INTRAVENOUS at 16:18

## 2019-04-01 ASSESSMENT — ACTIVITIES OF DAILY LIVING (ADL)
ADLS_ACUITY_SCORE: 13
ADLS_ACUITY_SCORE: 13
ADLS_ACUITY_SCORE: 14
ADLS_ACUITY_SCORE: 13
ADLS_ACUITY_SCORE: 13

## 2019-04-01 NOTE — ANESTHESIA CARE TRANSFER NOTE
Patient: Gab Holloway    Procedure(s):  LAPAROTOMY EXPLORATORY, resection of anastomosis, colostomy     Diagnosis: unknown  Diagnosis Additional Information: No value filed.    Anesthesia Type:   General, RSI, ETT     Note:  Airway :Face Mask  Patient transferred to:PACU  Comments: VSS, opens eyes to voice, resting comfortably, report to RN. Handoff Report: Identifed the Patient, Identified the Reponsible Provider, Reviewed the pertinent medical history, Discussed the surgical course, Reviewed Intra-OP anesthesia mangement and issues during anesthesia, Set expectations for post-procedure period and Allowed opportunity for questions and acknowledgement of understanding      Vitals: (Last set prior to Anesthesia Care Transfer)    CRNA VITALS  4/1/2019 1805 - 4/1/2019 1844      4/1/2019             Pulse:  91    SpO2:  99 %    Resp Rate (observed):  6  (Abnormal)                 Electronically Signed By: RUBEN Solomon CRNA  April 1, 2019  6:44 PM

## 2019-04-01 NOTE — PLAN OF CARE
Problem: Patient Care Overview  Goal: Plan of Care/Patient Progress Review  T max 99.2, otherwise VSS. Denied pain. Up ind, walking halls. Tolerating regular diet. Bowel sounds hypoactive, gas and stool through stoma. Midline intact, elver. TPN at goal rate of 85ml/hr. PICC to Left extremity.        Pt up to restroom and back    Sitter outside room in direct obs

## 2019-04-01 NOTE — ANESTHESIA PREPROCEDURE EVALUATION
Anesthesia Pre-Procedure Evaluation    Patient: Gab Holloway   MRN: [id number] : 1960          Preoperative Diagnosis: unknown    Procedure(s):  LAPAROTOMY EXPLORATORY    Past Medical History:   Diagnosis Date     Cancer (H)      Colon cancer (H)      Portal vein thrombosis 2019    After partial colectomy for colon cancer     Past Surgical History:   Procedure Laterality Date     COLECTOMY WITH COLOSTOMY, COMBINED N/A 3/1/2018    Procedure: COMBINED COLECTOMY WITH COLOSTOMY;;  Surgeon: Joann Delcid MD;  Location: [facility] OR     COLONOSCOPY       LAPAROSCOPIC ASSISTED COLECTOMY LEFT (DESCENDING) Left 3/1/2018    Procedure: LAPAROSCOPIC ASSISTED COLECTOMY LEFT (DESCENDING);   laparoscopic assistedconverted to open left leilani colectomy and colostomy;  Surgeon: Joann Delcid MD;  Location: [facility] OR     LAPAROSCOPY DIAGNOSTIC (GENERAL) N/A 3/26/2019    Procedure: Exploratory laparoscopy and open colostomy takedown;  Surgeon: Joann Delcid MD;  Location: [facility] OR     TAKEDOWN COLOSTOMY N/A 3/26/2019    Procedure: Open colostomy takedown;  Surgeon: Joann Delcid MD;  Location: [facility] OR     Anesthesia Evaluation     . Pt has had prior anesthetic.            ROS/MED HX    ENT/Pulmonary:  - neg pulmonary ROS    (-) sleep apnea   Neurologic:       Cardiovascular:  - neg cardiovascular ROS       METS/Exercise Tolerance:     Hematologic:     (+) History of blood clots pt is not anticoagulated, -      Musculoskeletal:         GI/Hepatic:     (+) Other GI/Hepatic free intrabdominal air     (-) GERD   Renal/Genitourinary:         Endo:         Psychiatric:         Infectious Disease:         Malignancy:   (+) Malignancy History of GI  GI CA  Active status post Surgery,         Other:                          Physical Exam      Airway   Mallampati: II  TM distance: >3 FB  Neck ROM: full    Dental     Cardiovascular   Rhythm and rate: regular and normal      Pulmonary    breath sounds clear to  "auscultation            Lab Results   Component Value Date    WBC 10.0 04/01/2019    HGB 12.8 (L) 04/01/2019    HCT 38.4 (L) 04/01/2019     04/01/2019     04/01/2019    POTASSIUM 3.4 04/01/2019    CHLORIDE 106 04/01/2019    CO2 26 04/01/2019    BUN 17 04/01/2019    CR 0.85 04/01/2019     (H) 04/01/2019    JANICE 8.6 04/01/2019    PHOS 3.5 03/14/2018    MAG 1.7 04/01/2019    ALBUMIN 2.4 (L) 03/12/2018    PROTTOTAL 6.4 (L) 03/12/2018    ALT 35 03/12/2018    AST 46 (H) 03/12/2018    ALKPHOS 202 (H) 03/12/2018    BILITOTAL 0.8 03/12/2018    LIPASE 122 02/26/2018    INR 1.09 03/12/2018       Preop Vitals  BP Readings from Last 3 Encounters:   04/01/19 140/77   03/18/19 120/70   12/04/18 114/74    Pulse Readings from Last 3 Encounters:   03/26/19 93   03/18/19 102   07/02/18 99      Resp Readings from Last 3 Encounters:   04/01/19 18   03/18/19 19   12/04/18 19    SpO2 Readings from Last 3 Encounters:   04/01/19 96%   03/18/19 99%   12/04/18 97%      Temp Readings from Last 1 Encounters:   04/01/19 102.1  F (38.9  C) (Temporal)    Ht Readings from Last 1 Encounters:   03/26/19 1.727 m (5' 8\")      Wt Readings from Last 1 Encounters:   03/26/19 90.9 kg (200 lb 8 oz)    Estimated body mass index is 30.49 kg/m  as calculated from the following:    Height as of this encounter: 1.727 m (5' 8\").    Weight as of this encounter: 90.9 kg (200 lb 8 oz).       Anesthesia Plan      History & Physical Review  History and physical reviewed and following examination; no interval change.    ASA Status:  3 emergent.    NPO Status:  > 8 hours    Plan for General, RSI and ETT with Intravenous and Propofol induction. Maintenance will be Balanced.    PONV prophylaxis:  Ondansetron (or other 5HT-3)  Additional equipment: Videolaryngoscope      Postoperative Care  Postoperative pain management:  IV analgesics, Oral pain medications and Multi-modal analgesia.      Consents  Anesthetic plan, risks, benefits and alternatives " discussed with:  Patient..                 Junito Pacheco MD                    .

## 2019-04-01 NOTE — PROGRESS NOTES
CRS Staff  POD #6 Colostomy takedown.  Persistent pain with fever.  Repeat CT concerning for anastomotic leak.  Plan for repeat laparotomy, possible stoma.  Discussed risks of bleeding, infection, need for further surgery, injury to adjacent structures, further abdominal infection.  and expectation regarding recovery.  He wishes to proceed.    Jorge Lan MD  [facility]  [phone] (office)  [phone] (pager)  [email or web address]

## 2019-04-01 NOTE — PLAN OF CARE
Pt a/o x 4.  Patient SBA due to weakness. O2 - 92% on room air.   Temp max was 103.1.  Has had Tylenol.  Most recent temp is 100.5.   Pt stated pain in left side of abdomen and hip was an 8/10. Refuses Oxy, but did take IV Toradol with some relief.   Voiding without difficulty. Several watery stools overnight.

## 2019-04-01 NOTE — PROGRESS NOTES
Page/texted hospitalist 'pt is being followed by colorectal.  Had ostomy take down and is POD 5.  Has a temp of 103.1.  Would you be able to see him?      Per hospitalist, they are unable to see him, as he is a surgical patient and doesn't have anyone else following.

## 2019-04-01 NOTE — PROGRESS NOTES
[facility] to discuss patients change in status. Patient has Temp of 103.1. Was previously running between 99.5-101.9.  Already receiving Tylenol.  Also has 8/10 pain in left side, which is very warm to touch, along with increased weakness, requiring assist of 1, when previously Independent.     Received call back from Dr. Casanova, who is on call tonight.  He wanted to know if patient was getting anything for pain. I explained that he does not want to take the Oxycodone, but is getting Tylenol and Torodal . He does not want to order any labs or do anything further at this time for his Temp or pain.      Will continue to monitor.

## 2019-04-01 NOTE — PROGRESS NOTES
COLON & RECTAL SURGERY  PROGRESS NOTE    April 1, 2019  Post-op Day # 6 colostomy takedown    SUBJECTIVE:  Febrile overnight to 103.  Minimal po intake.  Some nausea.  + several loose stools.  Left sided abdominal pain.    OBJECTIVE:  Temp:  [99.5  F (37.5  C)-103.1  F (39.5  C)] 99.7  F (37.6  C)  Heart Rate:  [] 98  Resp:  [20-28] 20  BP: ()/(58-81) 104/58  SpO2:  [92 %-98 %] 93 %  No intake or output data in the 24 hours ending 03/31/19 1333    GENERAL:  Awake, alert, no acute distress  ABDOMEN:  Soft, distended, left sided abdominal pain.  INCISION:  C/d/i, dressing to be changed later today    LABS:  Lab Results   Component Value Date    WBC 10.0 04/01/2019     Lab Results   Component Value Date    RBC 3.77 04/01/2019     Lab Results   Component Value Date    HGB 12.8 04/01/2019     Lab Results   Component Value Date    HCT 38.4 04/01/2019     No components found for: MCT  Lab Results   Component Value Date     04/01/2019     Lab Results   Component Value Date    MCH 34.0 04/01/2019     Lab Results   Component Value Date    MCHC 33.3 04/01/2019     Lab Results   Component Value Date    RDW 13.8 04/01/2019     Lab Results   Component Value Date     04/01/2019     Last Comprehensive Metabolic Panel:  Sodium   Date Value Ref Range Status   04/01/2019 138 133 - 144 mmol/L Final     Potassium   Date Value Ref Range Status   04/01/2019 3.4 3.4 - 5.3 mmol/L Final     Chloride   Date Value Ref Range Status   04/01/2019 106 94 - 109 mmol/L Final     Carbon Dioxide   Date Value Ref Range Status   04/01/2019 26 20 - 32 mmol/L Final     Anion Gap   Date Value Ref Range Status   04/01/2019 6 3 - 14 mmol/L Final     Glucose   Date Value Ref Range Status   04/01/2019 118 (H) 70 - 99 mg/dL Final     Urea Nitrogen   Date Value Ref Range Status   04/01/2019 17 7 - 30 mg/dL Final     Creatinine   Date Value Ref Range Status   04/01/2019 0.85 0.66 - 1.25 mg/dL Final     GFR Estimate   Date Value Ref Range  Status   04/01/2019 >90 >60 mL/min/[1.73_m2] Final     Comment:     Non  GFR Calc  Starting 12/18/2018, serum creatinine based estimated GFR (eGFR) will be   calculated using the Chronic Kidney Disease Epidemiology Collaboration   (CKD-EPI) equation.       Calcium   Date Value Ref Range Status   04/01/2019 8.6 8.5 - 10.1 mg/dL Final       ASSESSMENT/PLAN: POD#6 colostomy takedown.  Febrile overnight, + diarrhea and abdominal pain.  Fever work-up.   No leukocytosis.  1.  Check c. Diff given several loose stools, check UA  2.  CT scan abd/pelvis  3. Would back off diet for now, ok for liquids.  4. Encourage ambulation  5. Tylenol and toradol for pain control.  Oxycodone if additional pain meds needed but patient would like to avoid if able  6. lovenox for dvt prophylaxis and will need for discharge    Lillian Acevedo MD  [facility]  Phone: [phone]  Fax: [phone]

## 2019-04-01 NOTE — PLAN OF CARE
A&Ox4, /58, VSS.  Abdominal pain managed with prn IV Toradol.  Abdomen firm/distended, passing flatus, small liquid stool, negative for C. Diff.  Patient has been NPO except ice chips this  morning, strictly NPO since after abdominal CT at 1045.  Boo wipes done, patient unable to shower due to fatigue/weakness.  Plan for OR today.  Report given to PACU.  Wife at bedside.  Will continue to monitor.

## 2019-04-01 NOTE — PROVIDER NOTIFICATION
Patient had abdominal CT done.  Reading radiologist called with request surgeon be updated.  Dr. Cabezas office called with request for  Dr. Acevedo (covering for Dr. Delcid)  to be paged.

## 2019-04-01 NOTE — BRIEF OP NOTE
[facility]    Brief Operative Note    Pre-operative diagnosis: Suspected anastomotic leak  Post-operative diagnosis Anastomotic leak  Procedure: Procedure(s):  LAPAROTOMY EXPLORATORY, resection of anastomosis, colostomy   Surgeon: Surgeon(s) and Role:     * Susan Acevedo MD - Primary     * Lida Rodrigues PA-C - Assisting     * Jorge Lan MD - Assisting  Anesthesia: General   Estimated blood loss: 200 ml  Drains: None  Specimens:   ID Type Source Tests Collected by Time Destination   A : colocolonic anastomosis Tissue Colon SURGICAL PATHOLOGY EXAM Susan Acevedo MD 4/1/2019  5:07 PM    B : omentum Tissue Omentum SURGICAL PATHOLOGY EXAM Susan Acevedo MD 4/1/2019  5:52 PM      Findings:   feculent peritonitis centered around the anastomosis.  Rigid procto demonstrates large leak from end transverse colostomy staple line.  multiple interloop small bowel adhesions.  end transverse colostomy created..  Complications: None.  Implants: None.        IVF: 3000  UOP: 300  Condition on discharge from OR: Satisfactory    Jorge Lan MD   [facility]   [phone].        ADDENDUM:    PATIENT DATA  Indicate Y or N:  Home O2 No  Hemodialysis  No  Transplant patient  No  Cirrhosis  No  Steroids in last 30 days  No  Immunomodulators in last 30 days  No  Anticoagulation at time of surgery  No   List medication n  Prior abdominal surgery  Yes  Pelvic irradiation  No    Albumin within 30 days if known unknown   Hgb within 30 days if known    Hemoglobin   Date Value Ref Range Status   04/01/2019 12.8 (L) 13.3 - 17.7 g/dL Final   ]  Cr within 30 days if known    Creatinine   Date Value Ref Range Status   04/01/2019 0.85 0.66 - 1.25 mg/dL Final   ]  Body mass index is 30.49 kg/m .      OR DATA  Emergent  Yes   <24 hours  Yes   <1 week  Yes  Bowel Prep No  Antibiotics  Yes  DVT prophylaxis    Heparin  Yes   SCD  Yes   None  No  Drain  No  ASA  (1,2,3,4) 3  OR time (min) 120 min  Stents  No  Transfuse >/= 2U  No  Anastomosis   Stapled  No   Handsewn  No  Leak Test    Positive  No   Negative  No   Not done  Yes

## 2019-04-02 LAB
ANION GAP SERPL CALCULATED.3IONS-SCNC: 2 MMOL/L (ref 3–14)
BUN SERPL-MCNC: 18 MG/DL (ref 7–30)
CALCIUM SERPL-MCNC: 8 MG/DL (ref 8.5–10.1)
CHLORIDE SERPL-SCNC: 107 MMOL/L (ref 94–109)
CO2 SERPL-SCNC: 29 MMOL/L (ref 20–32)
CREAT SERPL-MCNC: 0.81 MG/DL (ref 0.66–1.25)
ERYTHROCYTE [DISTWIDTH] IN BLOOD BY AUTOMATED COUNT: 14.1 % (ref 10–15)
GFR SERPL CREATININE-BSD FRML MDRD: >90 ML/MIN/{1.73_M2}
GLUCOSE SERPL-MCNC: 117 MG/DL (ref 70–99)
HCT VFR BLD AUTO: 34.8 % (ref 40–53)
HGB BLD-MCNC: 11.6 G/DL (ref 13.3–17.7)
MAGNESIUM SERPL-MCNC: 1.9 MG/DL (ref 1.6–2.3)
MCH RBC QN AUTO: 34.4 PG (ref 26.5–33)
MCHC RBC AUTO-ENTMCNC: 33.3 G/DL (ref 31.5–36.5)
MCV RBC AUTO: 103 FL (ref 78–100)
PHOSPHATE SERPL-MCNC: 2.4 MG/DL (ref 2.5–4.5)
PLATELET # BLD AUTO: 165 10E9/L (ref 150–450)
POTASSIUM SERPL-SCNC: 3.8 MMOL/L (ref 3.4–5.3)
RBC # BLD AUTO: 3.37 10E12/L (ref 4.4–5.9)
SODIUM SERPL-SCNC: 138 MMOL/L (ref 133–144)
WBC # BLD AUTO: 8.2 10E9/L (ref 4–11)

## 2019-04-02 PROCEDURE — 84100 ASSAY OF PHOSPHORUS: CPT | Performed by: COLON & RECTAL SURGERY

## 2019-04-02 PROCEDURE — 12000000 ZZH R&B MED SURG/OB

## 2019-04-02 PROCEDURE — 40000903 ZZH STATISTIC WOC PT EDUCATION, 31-45 MIN

## 2019-04-02 PROCEDURE — 82947 ASSAY GLUCOSE BLOOD QUANT: CPT | Performed by: COLON & RECTAL SURGERY

## 2019-04-02 PROCEDURE — 25800030 ZZH RX IP 258 OP 636: Performed by: COLON & RECTAL SURGERY

## 2019-04-02 PROCEDURE — 25000132 ZZH RX MED GY IP 250 OP 250 PS 637: Performed by: COLON & RECTAL SURGERY

## 2019-04-02 PROCEDURE — 83735 ASSAY OF MAGNESIUM: CPT | Performed by: COLON & RECTAL SURGERY

## 2019-04-02 PROCEDURE — 25000128 H RX IP 250 OP 636: Performed by: COLON & RECTAL SURGERY

## 2019-04-02 PROCEDURE — 85027 COMPLETE CBC AUTOMATED: CPT | Performed by: COLON & RECTAL SURGERY

## 2019-04-02 PROCEDURE — 36415 COLL VENOUS BLD VENIPUNCTURE: CPT | Performed by: COLON & RECTAL SURGERY

## 2019-04-02 PROCEDURE — 80048 BASIC METABOLIC PNL TOTAL CA: CPT | Performed by: COLON & RECTAL SURGERY

## 2019-04-02 RX ADMIN — SODIUM CHLORIDE, POTASSIUM CHLORIDE, SODIUM LACTATE AND CALCIUM CHLORIDE: 600; 310; 30; 20 INJECTION, SOLUTION INTRAVENOUS at 05:54

## 2019-04-02 RX ADMIN — SODIUM CHLORIDE, POTASSIUM CHLORIDE, SODIUM LACTATE AND CALCIUM CHLORIDE: 600; 310; 30; 20 INJECTION, SOLUTION INTRAVENOUS at 13:27

## 2019-04-02 RX ADMIN — METRONIDAZOLE 500 MG: 500 INJECTION, SOLUTION INTRAVENOUS at 16:57

## 2019-04-02 RX ADMIN — CIPROFLOXACIN 400 MG: 2 INJECTION, SOLUTION INTRAVENOUS at 18:17

## 2019-04-02 RX ADMIN — METRONIDAZOLE 500 MG: 500 INJECTION, SOLUTION INTRAVENOUS at 00:32

## 2019-04-02 RX ADMIN — ACETAMINOPHEN 975 MG: 325 TABLET, FILM COATED ORAL at 13:01

## 2019-04-02 RX ADMIN — ENOXAPARIN SODIUM 40 MG: 40 INJECTION SUBCUTANEOUS at 11:39

## 2019-04-02 RX ADMIN — SODIUM CHLORIDE, POTASSIUM CHLORIDE, SODIUM LACTATE AND CALCIUM CHLORIDE: 600; 310; 30; 20 INJECTION, SOLUTION INTRAVENOUS at 23:40

## 2019-04-02 RX ADMIN — ACETAMINOPHEN 975 MG: 325 TABLET, FILM COATED ORAL at 03:40

## 2019-04-02 RX ADMIN — METRONIDAZOLE 500 MG: 500 INJECTION, SOLUTION INTRAVENOUS at 09:30

## 2019-04-02 RX ADMIN — CIPROFLOXACIN 400 MG: 2 INJECTION, SOLUTION INTRAVENOUS at 03:40

## 2019-04-02 RX ADMIN — ACETAMINOPHEN 975 MG: 325 TABLET, FILM COATED ORAL at 20:05

## 2019-04-02 RX ADMIN — METRONIDAZOLE 500 MG: 500 INJECTION, SOLUTION INTRAVENOUS at 23:40

## 2019-04-02 ASSESSMENT — ACTIVITIES OF DAILY LIVING (ADL)
ADLS_ACUITY_SCORE: 14
ADLS_ACUITY_SCORE: 14
ADLS_ACUITY_SCORE: 13
ADLS_ACUITY_SCORE: 14

## 2019-04-02 NOTE — PLAN OF CARE
Pt is AOx4, Ax1 with gait belt to transfer. VSS. Pain controlled with PCA dilaudid. Scant amount of serousanguineous fluid in ostomy bag. NG output is dark brown -400 ml output this shift. Bowels hypoactive x4. Pt remains NPO except for ice chips. Will continue to monitor.

## 2019-04-02 NOTE — PROGRESS NOTES
COLON & RECTAL SURGERY  PROGRESS NOTE    April 2, 2019  Post-op Day # 1 exploratory laparotomy, resection of anastomosis, & colostomy.    SUBJECTIVE:  No acute events overnight.  Pain is well controlled with PCA pump.  Taking few ice chips PO for comfort, no nausea/vomiting.  Has not dangled legs or stood at the bedside yet.    OBJECTIVE:  Temp:  [98.1  F (36.7  C)-102.1  F (38.9  C)] 98.3  F (36.8  C)  Pulse:  [89-99] 92  Heart Rate:  [] 96  Resp:  [14-34] 24  BP: ()/(48-77) 110/48  FiO2 (%):  [100 %] 100 %  SpO2:  [89 %-100 %] 94 %    Intake/Output Summary (Last 24 hours) at 4/2/2019 0904  Last data filed at 4/2/2019 0543  Gross per 24 hour   Intake 2887 ml   Output 1450 ml   Net 1437 ml       GENERAL:  Awake, alert, no acute distress, lying in bed.  HEAD: Nomocephalic atraumatic  SCLERA: anicteric  EXTREMITIES: warm and well perfused  ABDOMEN:  Soft, appropriately tender, distended, no rebound or guarding, no peritoneal signs.  Stoma viable, but swollen.  Minimal bloody drainage in bag.  INCISION:  Dressing in place with shadowing of drainage.    LABS:  Lab Results   Component Value Date    WBC 10.0 04/01/2019     Lab Results   Component Value Date    HGB 12.8 04/01/2019     Lab Results   Component Value Date    HCT 38.4 04/01/2019     Lab Results   Component Value Date     04/01/2019     Last Basic Metabolic Panel:  Lab Results   Component Value Date     04/01/2019      Lab Results   Component Value Date    POTASSIUM 3.4 04/01/2019     Lab Results   Component Value Date    CHLORIDE 106 04/01/2019     Lab Results   Component Value Date    JANICE 8.6 04/01/2019     Lab Results   Component Value Date    CO2 26 04/01/2019     Lab Results   Component Value Date    BUN 17 04/01/2019     Lab Results   Component Value Date    CR 0.77 04/01/2019     Lab Results   Component Value Date     04/01/2019       ASSESSMENT/PLAN: POD # 1 exploratory laparotomy, resection of anastomosis, & colostomy.   Low grade temp of 100.6 overnight, improved to 98.1 this morning.  Vital signs otherwise stable.  Labs unremarkable.  NG tube with 50 ml out.    1. Continue PCA pump for pain control  2. Keep NPO, ice chips ok for comfort.  3. Keep NG tube for now, likely do clamping trial tomorrow.   4. Keep long for now, likely discontinue tomorrow.  5. Encourage OOB.  6. Lovenox for ppx, will need at discharge as well.      For questions/paging, please contact the [facility] office at [phone].    Lida Rodrigues PA-C  [facility]  Phone: [phone]      [facility]  I performed a history and physical examination of the patient and discussed their management with the physician assistant. I reviewed the physician assistants note and agree with the documented findings and plan of care.     POD#1 s/p ex lap with colostomy takedown for anastomotic leak with feculent peritonitis.  Feeling much better this am.  Low grade temp overnight not unexpected.    NGT in place.  No output from colostomy  Abdomen distended and appropriately tender per incisions, stoma pink, edematous with bowel sweat in bag    Plan:   Cont NPO, NGT and IVFs - suspect patient will have a post-op ileus.    Cont IV ABx - 7 -10 day course  DVT ppx  Encourage OOB and ambulation      Lillian Acevedo MD  [facility]  Office Phone # [phone].

## 2019-04-02 NOTE — PROGRESS NOTES
SPIRITUAL HEALTH SERVICES  SPIRITUAL ASSESSMENT Progress Note  [facility] Med. Surg. 5    PRIMARY FOCUS:     Goals of care    Emotional/spiritual/Rastafari distress    Support for coping    ILLNESS CIRCUMSTANCES:   Saw pt Yoel per his length of stay and information shared in IDT rounds.  Assessed emotional/spiritual needs and resources while offering reflective conversation, which integrated elements of illness and family narratives.  Yoel's wife Maddy and her brother and sister-in-law arrived during our conversation.    Context of Serious Illness/Symptom(s) - Yoel was admitted for a colostomy takedown but a post-surgical leak required a resection with ostomy.      Persons/Resources Involved - Yoel named his wife and his four adult sons, all of whom live in the area.    DISTRESS:     Emotional/Existential/Relational Distress - Yoel knew beforehand that the takedown might not be successful but acknowledged that it would have been easier if the takedown did not work from the start rather than return back to surgery for another ostomy.  He has decided not to attempt another takedown in the future but to move on and live his life.    Spiritual/Temple Distress - none directly expressed.    Social/Cultural/Economic Distress - Yoel is a ; he is currently on short-term disability.    SPIRIT (Coping):     Worship/Margarita - Maddy reported that they are Jehovah Witnesses and that margarita plays a significant role in their lives.    Spiritual Practice(s) - not discussed.    Emotional/Existential/Relational Connections - Yoel cj through his sense of humor and enjoys going camping.    SENSE-MAKING:    Goals of Care - Yoel expects to have a clamping trial with the NG tomorrow.  Reviewed how he and his family can request further  support.    Meaning/Sense-Making - Yoel reflected on his decision to keep the ostomy and expressed gratitude for the length and quality of his life, particularly in light of when he thinks of  young children who have end-stage cancer.    PLAN: No further plans; I and other chaplains remain available per pt/family request.    Tino Kramer M.Div., [facility]  Staff   Pager [phone]

## 2019-04-02 NOTE — PROGRESS NOTES
[facility] Nurse Inpatient Ostomy Assessment      Assessment of ostomy and needs for:  Transverse  Colostomy      Data:   History:      Per MD note(s):  April 1, 2019 Post-op: Exploratory laparotomy, resection of anastomosis, & colostomy.    Type of ostomy:  Colostomy  Stoma assessment:   ? Size of stoma:  Large edematous and protuberant with intact pouch,  red and moist  Mucocutaneous Junction (MCJ):  not visualized (barrier in place)  Peristomal skin:  not visualized (barrier in place)  Abdominal assessment: midline incision and old stoma site covered  ? N/G still in place?:  yes  Output:  scant , serosanguinous,      I/O last 3 completed shifts:  In: 2887 [P.O.:650; I.V.:2237]  Out: 1450 [Urine:750; Emesis/NG output:50; Other:450; Blood:200]  Current pouching system:  Satish 1 pc from OR,     Pain:  Cramping   ? Is pt still on a PCA? Yes    Diet:         Orders Placed This Encounter        Diet        NPO for Medical/Clinical Reasons Except for: Meds, Ice Chips      Labs:   Recent Labs   Lab Test 04/02/19  0916  03/12/18  0530   ALBUMIN  --   --  2.4*   HGB 11.6*   < >  --    INR  --   --  1.09   WBC 8.2   < >  --     < > = values in this interval not displayed.           Intervention:     Patient's chart evaluated.      Assessments done today:  Stoma and pouch    Education: pt is an experienced ostomate; minimal education will be needed    Prepared for discharge by: Supplies ordered and Discussed when to follow up with a [facility] Nurse in the future    Pt registered for ostomy supply samples? On discharge         Assessment:     Stoma assessment: viable, beefy red, moist, edematous and protuberant with intact pouch and serousang drainage/ sweat    Learning needs/ comprehension: very experienced ostomate; very supportive spouse    Effectiveness of current pouching/ supply plan: TBD         Plan:     Plan:   ? Current pouching system: [place] 1 pc from OR x 1 day    Education:  ? Education  completed:  Pouch Emptying and Pouch Replacement, How to empty pouch, Removal of pouch, Preparation of new pouch, Cutting out or evaluating a pattern, Applying paste or rings, Applying appliance to abdomen, Peristomal skin care, Diet and hydration / fluid balance, Odor / flatus management and Lifestyle Adjustments; pt expressed needing assistance with pouching at night and information was provided  ? Is pt able to demonstrate: 1. how to empty their pouch?  yes  o Supply company: [employer] is used  o Do WOC Nurse recommend home care ? no

## 2019-04-02 NOTE — OP NOTE
Procedure Date: 04/01/2019      PREOPERATIVE DIAGNOSIS:  Suspected anastomotic leak.      POSTOPERATIVE DIAGNOSIS:  Suspected anastomotic leak.      PROCEDURE PERFORMED:   1. Exploratory laparotomy.   2. Resection colocolonic anastomosis.   3. End transverse colostomy.   4. Partial omentectomy.      STAFF SURGEON:  Jorge Lan MD      FIRST ASSISTANT:  Lillian Mishra MD.      SECOND ASSISTANT:  Nicole Rodrigues PA-C.      THIRD ASSISTANT:  Raj Casanova MD, [facility] Corrective Surgery Fellow.      ANESTHESIA:  General.      ESTIMATED BLOOD LOSS:  200 mL.      SPECIMENS:   1. Colocolonic anastomosis.   2. Omentum.      FINDINGS:  There was feculent peritonitis throughout the abdomen with significant amount of air.  Peritonitis seemed to be centered in the left mid abdomen.  There was feculent staining over the small bowel on the side as well as the anastomosis.  Initially on inspection of the anastomosis, I did not identify a leak.  However, we performed a rigid proctoscopy and there was a large leak from the end transverse staple line on the transverse portion of the anastomosis.  The anastomosis was configured as a side-to-side functional end-to-end anastomosis with partially stapled, partially handsewn configuration.  The small bowel has multiple interloop adhesions and was significantly dilated precluding the creation of a diverting loop ileostomy.      INDICATIONS:  Gab is a 58-year-old gentleman with history of colon cancer who underwent a colostomy reversal 6 days ago.  He spiked a high fever yesterday.  A repeat CT scan today demonstrated a large amount of free air and free fluid.  Examination at the bedside today by myself demonstrated peritonitis.  I recommended exploratory laparotomy for concerns for anastomotic leak.  The risks of surgery including the risk of bleeding, infection, injury to adjacent structures, need for further surgery, need for a colostomy, urinary tract  infection, DVT, complications of anesthesia, and even more serious complications were discussed.  He wishes to proceed.      DESCRIPTION OF PROCEDURE:  The patient was brought to the operating room and laid supine on the operating table.  General anesthesia was induced, and the patient was intubated by the anesthesia service without difficulty.  A Blount catheter was placed under sterile conditions.  A nasogastric tube was placed.  The arms were tucked to the side.  He was placed in low modified lithotomy position.  All pressure points were inspected and padded appropriately.  The abdomen was prepped and draped in the usual sterile fashion.  A timeout was undertaken, which identified the patient and correct procedure.      I opened up the patient's previous midline incision and extended it cephalad as well as inferiorly quite a bit.  The midline suture was grasped with a Kocher clamp and cut.  The suture was eventually fully removed, and we could enter into the abdomen.  We were promptly met with feculent-appearing material and this was aspirated.  We extended the incision cephalad incising the fascia under direct vision without difficulty and eventually used an Lito wound retractor for exposure.  We irrigated out the abdomen quite a bit and I was able to identify the anastomosis and some flimsy adhesions in omentum to the small bowel which were taken down with blunt and sharp dissection.  Once the anastomosis was isolated, Lida Rodrigues went to the perineal field.  Under my direction, she performed a rigid proctoscopy and a large amount of bubbling could be identified at the anastomosis.  The anastomosis was in a side-to-side functional end-to-end configuration with a partially stapled, partially handsewn configuration.  Upon further inspection and repeat leak testing, I had identified that the leak was at the portion of the transverse colon and transverse staple line.  I felt that the anastomosis really  needed to be resected given that the tissue was somewhat thin and fragile and would not really hold any sutures.  Therefore, the anastomosis proximally and distally was isolated.  It was stapled off distally with a transverse TA 90 stapler.  It was stapled off proximally with firing of a blue load 75 mm MAGDALENO stapler.      We then irrigated the abdomen with approximately 10 liters of warm normal saline.  Satisfied with our irrigation and evacuation of all purulent material, we resected a portion of the omentum that was quite feculent and staining as well as appeared somewhat necrotic.  This was resected with the LigaSure device.  We then freed up the adhesions of the omentum to the transverse colonic mesentery and incised the peritoneum to allow it to create a nice end-transverse colostomy.  A trephine was made in the left mid abdomen in a mirror image from the patient's prior colostomy site.  A disk of skin was excised.  Dissection was taken down with electrocautery.  The fascia was incised in a craniocaudal fashion.  The muscles of the rectus fibers were spread and the posterior fascia was similarly incised in a craniocaudal fashion with a laparotomy pad to protect the underlying viscera.  The bowel was then brought through the trephine in proper orientation.      We then closed the abdomen utilizing a closing tray.  The midline was then closed with 2 running sutures of looped 0 PDS and #1 Vicryl pop-off sutures as internal retention sutures.  The skin was irrigated and closed with skin staples.  The colostomy was matured by excising the staple line and matured in a Maki fashion with interrupted 3-0 Vicryl sutures.  Stoma appliance was applied.  Dressings were placed in the midline.  Lap, sponge and needle counts were correct at the end of the case x 2.                        VAHE RAZA MD             D: 04/01/2019   T: 04/01/2019   MT:       Name:     TEDDY CLEARY   MRN:      [id number]         Account:        [id number]   :      1960           Procedure Date: 2019      Document: [id number]

## 2019-04-02 NOTE — PLAN OF CARE
VSS stable during shift, afebrile. Pain managed with PCA pump; 1.4 mg of dilaudid used durin shift. NG on low intermittent suction had 650 ml out during shift. Monitoring patient for adequate urine output as shift total was 325 ml. Pt ambulated in phipps to visitors lounge and back x 1. Also monitoring midline dressing for expanding drainage; marked with sharpie and pen.

## 2019-04-02 NOTE — PLAN OF CARE
VS: low BP, max temp 100.4, tachy  Orientation: WDL  Tele: NA  Glucose checks: POD 1 completed  Activity: not up yet  Diet: NPO-ice and meds  GI: hypo bowel sounds, new ostomy, NGT- low int suction  : long in place  Respiratory: 2L NC, capno until 1815  IV: LR @125  Plan: TBD

## 2019-04-02 NOTE — PROGRESS NOTES
"CLINICAL NUTRITION SERVICES - REASSESSMENT NOTE      Recommendations Ordered by Registered Dietitian (RD):   - Diet advancement per MD   - Weight check    Malnutrition (4/2):  % Weight Loss:  Weight loss does not meet criteria for malnutrition   % Intake:  </= 50% for >/= 5 days (severe malnutrition)  Subcutaneous Fat Loss:  None observed  Muscle Loss:  None observed  Fluid Retention:  Edema around old stoma sight - not nutrition-related      Malnutrition Diagnosis: Patient does not meet two of the above criteria necessary for diagosing for malnutrition       EVALUATION OF PROGRESS TOWARD GOALS   Diet:  NPO for procedure    Intake/Tolerance:    - Barriers to intake during admission: nausea, poor appetite, heartburn, limited diets since admit (either NPO or liquids only except for one day)  - Suspect intake <25-50% for >5 days, appetite was good PTA  - Pt reports that his nausea is gone today, feels \"100% better\" - is looking forward to being able to eat  -Last BM: 4/1 x 2 (loose stools)      ASSESSED NUTRITION NEEDS PER APPROVED PRACTICE GUIDELINES:     Dosing Weight: 75 kg (adjusted) - based on driest wt since admit 90.9 kg on 3/26 and IBW of 70 kg  Estimated Energy Needs: 1875 - 2250 kcals (25 - 30 Kcal/Kg)  Justification: obese, maintenence  Estimated Protein Needs: 113 - 150 grams protein (1.5-2 g pro/Kg)  Justification: post-op, obesity guidelines  and preservation of lean body  Estimated Fluid Needs: 1500 - 1875 mL (1 mL/Kcal)  Justification: maintenance    Malnutrition (4/2): updated  % Weight Loss:  Weight loss does not meet criteria for malnutrition   % Intake:  </= 50% for >/= 5 days (severe malnutrition)  Subcutaneous Fat Loss:  None observed  Muscle Loss:  None observed  Fluid Retention:  Edema around old stoma sight - not nutrition-related      Malnutrition Diagnosis: Patient does not meet two of the above criteria necessary for diagosing for malnutrition      NEW FINDINGS:   - 4/1 procedure:  1.  " Exploratory laparotomy.   2.  Resection colocolonic anastomosis.   3.  End transverse colostomy.   4.  Partial omentectomy.     - Labs reviewed: Phos 2.4 (L)  - Medications reviewed    Previous Goals:   Diet advancement past Clear Liquids within 48 hours.  Evaluation: Met within 48 hrs - now does not meet criteria     Previous Nutrition Diagnosis:   Inadequate protein-energy intake related to clear liquid diet only, altered  GI status with recent surgery, and poor appetite as evidenced by consumption of only sips of clears past 2 days.  Evaluation: No change      CURRENT NUTRITION DIAGNOSIS  Inadequate protein-energy intake related to limited diets since admit (3/23), altered  GI status with recent surgery, and poor appetite as evidenced by <50% intake >5 days.      INTERVENTIONS  Recommendations / Nutrition Prescription  - Diet advancement per MD     Implementation  Inquired to see if pt had any follow-up questions about lower fiber ed we discussed last week. Pt feels comfortable with the guidelines for low fiber once diet is advanced.     Collaboration and Referral of Care - discussed pt during IDT rounds.     Goals  Diet advancement > Full Liquid in the next 48 hours.       MONITORING AND EVALUATION:  Progress towards goals will be monitored and evaluated per protocol and Practice Guidelines    Cami Huddleston, Dietetic Intern

## 2019-04-02 NOTE — ANESTHESIA POSTPROCEDURE EVALUATION
Patient: Gab Holloway    Procedure(s):  LAPAROTOMY EXPLORATORY, resection of anastomosis, colostomy     Diagnosis:unknown  Diagnosis Additional Information: Pre-operative diagnosis:         Suspected anastomotic leak  Post-operative diagnosis        Anastomotic leak  Procedure:      Procedure(s):  LAPAROTOMY EXPLORATORY, resection of anastomosis, colostomy         Anesthesia Type:  General, RSI, ETT    Note:  Anesthesia Post Evaluation    Patient location during evaluation: PACU  Patient participation: Able to fully participate in evaluation  Level of consciousness: awake and alert  Pain management: adequate  Airway patency: patent  Cardiovascular status: acceptable  Respiratory status: acceptable  Hydration status: acceptable  PONV: none     Anesthetic complications: None          Last vitals:  Vitals:    04/01/19 1930 04/01/19 1945 04/01/19 1956   BP: 97/66 114/72    Pulse: 94 99    Resp: 14 22    Temp:  98.2  F (36.8  C)    SpO2: 95% 95% 95%         Electronically Signed By: Jeet Dial MD  April 1, 2019  7:57 PM

## 2019-04-03 LAB
COPATH REPORT: NORMAL
GLUCOSE BLDC GLUCOMTR-MCNC: 95 MG/DL (ref 70–99)

## 2019-04-03 PROCEDURE — 12000000 ZZH R&B MED SURG/OB

## 2019-04-03 PROCEDURE — 00000146 ZZHCL STATISTIC GLUCOSE BY METER IP

## 2019-04-03 PROCEDURE — C9113 INJ PANTOPRAZOLE SODIUM, VIA: HCPCS | Performed by: PHYSICIAN ASSISTANT

## 2019-04-03 PROCEDURE — 25000128 H RX IP 250 OP 636: Performed by: PHYSICIAN ASSISTANT

## 2019-04-03 PROCEDURE — 25800030 ZZH RX IP 258 OP 636: Performed by: COLON & RECTAL SURGERY

## 2019-04-03 PROCEDURE — 25000128 H RX IP 250 OP 636: Performed by: COLON & RECTAL SURGERY

## 2019-04-03 PROCEDURE — 40000902 ZZH STATISTIC WOC PT EDUCATION, 16-30 MIN

## 2019-04-03 PROCEDURE — 25000132 ZZH RX MED GY IP 250 OP 250 PS 637: Performed by: COLON & RECTAL SURGERY

## 2019-04-03 RX ADMIN — SODIUM CHLORIDE, POTASSIUM CHLORIDE, SODIUM LACTATE AND CALCIUM CHLORIDE: 600; 310; 30; 20 INJECTION, SOLUTION INTRAVENOUS at 11:17

## 2019-04-03 RX ADMIN — ACETAMINOPHEN 975 MG: 325 TABLET, FILM COATED ORAL at 12:09

## 2019-04-03 RX ADMIN — METRONIDAZOLE 500 MG: 500 INJECTION, SOLUTION INTRAVENOUS at 08:15

## 2019-04-03 RX ADMIN — METRONIDAZOLE 500 MG: 500 INJECTION, SOLUTION INTRAVENOUS at 15:55

## 2019-04-03 RX ADMIN — ACETAMINOPHEN 975 MG: 325 TABLET, FILM COATED ORAL at 20:11

## 2019-04-03 RX ADMIN — CIPROFLOXACIN 400 MG: 2 INJECTION, SOLUTION INTRAVENOUS at 06:28

## 2019-04-03 RX ADMIN — SODIUM CHLORIDE, POTASSIUM CHLORIDE, SODIUM LACTATE AND CALCIUM CHLORIDE: 600; 310; 30; 20 INJECTION, SOLUTION INTRAVENOUS at 21:21

## 2019-04-03 RX ADMIN — PANTOPRAZOLE SODIUM 40 MG: 40 INJECTION, POWDER, FOR SOLUTION INTRAVENOUS at 14:43

## 2019-04-03 RX ADMIN — ACETAMINOPHEN 975 MG: 325 TABLET, FILM COATED ORAL at 04:12

## 2019-04-03 RX ADMIN — ENOXAPARIN SODIUM 40 MG: 40 INJECTION SUBCUTANEOUS at 12:13

## 2019-04-03 RX ADMIN — CIPROFLOXACIN 400 MG: 2 INJECTION, SOLUTION INTRAVENOUS at 18:17

## 2019-04-03 ASSESSMENT — ACTIVITIES OF DAILY LIVING (ADL)
ADLS_ACUITY_SCORE: 13

## 2019-04-03 ASSESSMENT — MIFFLIN-ST. JEOR: SCORE: 1711.66

## 2019-04-03 NOTE — PLAN OF CARE
Patient remains hospitalized for ostomy TD, currently POD # 7, as well as exploratory lap + new ostomy, POD # 2. Pain controlled with PCA. Bowel sounds active x 4. Passing gas. No output from ostomy at this time. Denies nausea. Currently on NPO diet with ice chips. Incision dressing intact. Voiding okay post Blount catheter removal. Ambulating with SBA.

## 2019-04-03 NOTE — PLAN OF CARE
VSS stable during shift, afebrile. Pain managed with PCA pump; 1 mg of dilaudid used durin shift. NG on low intermittent suction had 600 ml out during shift. Monitoring patient for adequate urine output as shift total was 350 ml. Also monitoring midline dressing for expanding drainage;

## 2019-04-03 NOTE — PROGRESS NOTES
COLON & RECTAL SURGERY  PROGRESS NOTE    April 3, 2019  Post-op Day # 2 s/p ex lap with colostomy takedown for anastomotic leak with feculent peritonitis.       SUBJECTIVE:  No acute events overnight.  Patient reports he is doing well.  He is NPO except for ice chips for comfort- no nausea or vomiting.  Pain is well controlled, he rates it 2/10.  Has been up out of bed x1 already this morning.    OBJECTIVE:  Temp:  [97  F (36.1  C)-100.2  F (37.9  C)] 98.6  F (37  C)  Pulse:  [78-97] 97  Heart Rate:  [] 102  Resp:  [20-30] 20  BP: (103-119)/(58-65) 107/65  SpO2:  [91 %-94 %] 94 %    Intake/Output Summary (Last 24 hours) at 4/3/2019 0755  Last data filed at 4/3/2019 0730  Gross per 24 hour   Intake 3105 ml   Output 2575 ml   Net 530 ml       GENERAL:  Awake, alert, no acute distress, lying in bed.  HEAD: Nomocephalic atraumatic  SCLERA: anicteric  EXTREMITIES: warm and well perfused  ABDOMEN:  Soft, appropriately tender, distended, no rebound or guarding, no peritoneal signs.  Stoma viable and edematous.  INCISION:  C/d/i, staples in place.  No sign of infection.    LABS:  Lab Results   Component Value Date    WBC 8.2 04/02/2019     Lab Results   Component Value Date    HGB 11.6 04/02/2019     Lab Results   Component Value Date    HCT 34.8 04/02/2019     Lab Results   Component Value Date     04/02/2019     Last Basic Metabolic Panel:  Lab Results   Component Value Date     04/02/2019      Lab Results   Component Value Date    POTASSIUM 3.8 04/02/2019     Lab Results   Component Value Date    CHLORIDE 107 04/02/2019     Lab Results   Component Value Date    JANICE 8.0 04/02/2019     Lab Results   Component Value Date    CO2 29 04/02/2019     Lab Results   Component Value Date    BUN 18 04/02/2019     Lab Results   Component Value Date    CR 0.81 04/02/2019     Lab Results   Component Value Date     04/02/2019       ASSESSMENT/PLAN: POD #2 s/p ex lap with colostomy takedown for anastomotic leak  with feculent peritonitis. Afebrile, VSS.  NG tube with 200 ml out so far this morning.    1. Ok to discontinue long catheter today.  2. Keep NG tube for now.  3. Keep NPO except for ice chips for comfort.  4. Continue IV fluids.  5. Continue IV abx  6. Continue current pain regimen.  7. Encourage OOB.  8. Continue lovenox for DVT ppx, will need at discharge as well.      For questions/paging, please contact the CRS office at [phone].    Lida Rodrigues PA-C  [facility]  Phone: [phone]    Colon and Rectal Surgery Staff  I performed a history and physical examination of the patient and discussed their management with the physician assistant. I reviewed the physician assistants note and agree with the documented findings and plan of care.     POD#2 s/p ex lap, takedown of colocolonic anastomosis and end colostomy creation for anastomotic leak.  Doing well.  NGT 1300 yesterday.  Afebrile, normal leukocytosis.  Awaiting return of bowel function but suspect patient may have a prolonged ileus given level of intraabdominal contamination.  - Cont NPO, IVFs and NGT  - Cont IV ABx  - Encourage OOB and Ambulation  - Lovenox for DVT ppx      Lillian Acevedo MD  [facility]  Office Phone # [phone].

## 2019-04-03 NOTE — PLAN OF CARE
Cared for patient from [id number]. Patient remains hospitalized following ostomy takedown, currently POD #8, as well as exp lap with new ostomy placement,currently POD #23. Pain controlled with Dilaudid PCA. Bowel sounds hypoactive this evening. No gas or stool in ostomy, though slight amount of serous fluid. Denies nausea. Currently NPO with IVF. NGT to LIS. Incision dressing CDI, stoma site dressing changed and new packing placed. Voiding without difficulty. Ambulating with standby assist.

## 2019-04-03 NOTE — PROGRESS NOTES
Focus: ostomy    D/I: continued ostomy education with spouse as pt is sleeping, new pouch and belt was shown and demonstrated.      Intake/Output Summary (Last 24 hours) at 4/3/2019 1607  Last data filed at 4/3/2019 1451  Gross per 24 hour   Intake 2799 ml   Output 2300 ml   Net 499 ml     A/p: Experienced ostomy pt, sleeping today, spouse is excited to show the new products to pt and I will return tomorrow.

## 2019-04-04 LAB
LACTATE BLD-SCNC: 1 MMOL/L (ref 0.7–2)
PLATELET # BLD AUTO: 247 10E9/L (ref 150–450)

## 2019-04-04 PROCEDURE — G0463 HOSPITAL OUTPT CLINIC VISIT: HCPCS

## 2019-04-04 PROCEDURE — 25000132 ZZH RX MED GY IP 250 OP 250 PS 637: Performed by: COLON & RECTAL SURGERY

## 2019-04-04 PROCEDURE — C9113 INJ PANTOPRAZOLE SODIUM, VIA: HCPCS | Performed by: PHYSICIAN ASSISTANT

## 2019-04-04 PROCEDURE — 25000128 H RX IP 250 OP 636: Performed by: COLON & RECTAL SURGERY

## 2019-04-04 PROCEDURE — 83605 ASSAY OF LACTIC ACID: CPT | Performed by: COLON & RECTAL SURGERY

## 2019-04-04 PROCEDURE — 36415 COLL VENOUS BLD VENIPUNCTURE: CPT | Performed by: COLON & RECTAL SURGERY

## 2019-04-04 PROCEDURE — 12000000 ZZH R&B MED SURG/OB

## 2019-04-04 PROCEDURE — 25000128 H RX IP 250 OP 636: Performed by: PHYSICIAN ASSISTANT

## 2019-04-04 PROCEDURE — 85049 AUTOMATED PLATELET COUNT: CPT | Performed by: COLON & RECTAL SURGERY

## 2019-04-04 PROCEDURE — 25800030 ZZH RX IP 258 OP 636: Performed by: COLON & RECTAL SURGERY

## 2019-04-04 RX ADMIN — Medication: at 06:05

## 2019-04-04 RX ADMIN — CIPROFLOXACIN 400 MG: 2 INJECTION, SOLUTION INTRAVENOUS at 06:01

## 2019-04-04 RX ADMIN — SODIUM CHLORIDE, POTASSIUM CHLORIDE, SODIUM LACTATE AND CALCIUM CHLORIDE: 600; 310; 30; 20 INJECTION, SOLUTION INTRAVENOUS at 16:05

## 2019-04-04 RX ADMIN — ACETAMINOPHEN 975 MG: 325 TABLET, FILM COATED ORAL at 05:14

## 2019-04-04 RX ADMIN — ACETAMINOPHEN 650 MG: 325 TABLET, FILM COATED ORAL at 21:00

## 2019-04-04 RX ADMIN — METRONIDAZOLE 500 MG: 500 INJECTION, SOLUTION INTRAVENOUS at 19:54

## 2019-04-04 RX ADMIN — SODIUM CHLORIDE, POTASSIUM CHLORIDE, SODIUM LACTATE AND CALCIUM CHLORIDE: 600; 310; 30; 20 INJECTION, SOLUTION INTRAVENOUS at 08:49

## 2019-04-04 RX ADMIN — PANTOPRAZOLE SODIUM 40 MG: 40 INJECTION, POWDER, FOR SOLUTION INTRAVENOUS at 09:55

## 2019-04-04 RX ADMIN — ENOXAPARIN SODIUM 40 MG: 40 INJECTION SUBCUTANEOUS at 12:25

## 2019-04-04 RX ADMIN — CIPROFLOXACIN 400 MG: 2 INJECTION, SOLUTION INTRAVENOUS at 18:01

## 2019-04-04 RX ADMIN — METRONIDAZOLE 500 MG: 500 INJECTION, SOLUTION INTRAVENOUS at 00:02

## 2019-04-04 RX ADMIN — METRONIDAZOLE 500 MG: 500 INJECTION, SOLUTION INTRAVENOUS at 12:24

## 2019-04-04 ASSESSMENT — ACTIVITIES OF DAILY LIVING (ADL)
ADLS_ACUITY_SCORE: 15
ADLS_ACUITY_SCORE: 15
ADLS_ACUITY_SCORE: 13
ADLS_ACUITY_SCORE: 13
ADLS_ACUITY_SCORE: 15
ADLS_ACUITY_SCORE: 13

## 2019-04-04 ASSESSMENT — MIFFLIN-ST. JEOR: SCORE: 1724.36

## 2019-04-04 NOTE — PLAN OF CARE
Orientation: A&OX3  VSS  SpO2: Continuous due to PCA pump   LS: Clear all fields   GI: NG to LIS. NPO ice chips. Ostomy with red russell catheter placed today.    : Monitor   Skin: dry and intact. Midline staples.   Activity: SBA with GB    Pain: PCA dilaudid    Plan: supportive cares. IVF, IV abx, possibly PICC for TPN, WOC following, Watching NG and ostomy closely.

## 2019-04-04 NOTE — PROGRESS NOTES
Colon and Rectal Surgery Progress Note          Assessment and Plan:   POD # 3 ex lap, colostomy  Still with ileus.  Will have PA put a RRC in stoma  May need PICC and TPN if no return of bowel function in next day  Continue antibiotics.    Jorge Lan MD  Colorectal Surgery  [phone] (office)  [phone] (pager)  [email or web address]             Interval History:   Feeling better.  Feels bloated.              Physical Exam:   Vitals were reviewed  Patient Vitals for the past 24 hrs:   BP Temp Temp src Heart Rate Resp SpO2 Weight   04/04/19 0650 -- -- -- -- -- -- 93.5 kg (206 lb 1.6 oz)   04/04/19 0001 127/73 98.3  F (36.8  C) Oral 90 16 93 % --   04/03/19 1559 119/66 97.6  F (36.4  C) Oral 98 16 93 % --   04/03/19 1209 -- 99.2  F (37.3  C) -- -- -- -- --   04/03/19 0813 108/64 97  F (36.1  C) Oral 94 20 92 % 92.2 kg (203 lb 4.8 oz)         Intake/Output Summary (Last 24 hours) at 4/4/2019 0729  Last data filed at 4/4/2019 0600  Gross per 24 hour   Intake 791 ml   Output 1800 ml   Net -1009 ml       Head - Nomocephalic atraumatic  Sclera anicteric  Extremities warm and well perfused  Lungs - breathing non labored,   Abdomen - NG bilious, abdomen moderately distended, appropriately tender,stoma edematous  Rectal exam - deferred  Skin - no rash  Psych - affect appropriate  Neuro - no focal deficits             Data:   All laboratory and imaging data in the past 24 hours reviewed    CBC  Lab Results   Component Value Date    WBC 8.2 04/02/2019    WBC 10.0 04/01/2019    WBC 9.0 03/31/2019    HGB 11.6 (L) 04/02/2019    HGB 12.8 (L) 04/01/2019    HGB 13.6 03/31/2019    HCT 34.8 (L) 04/02/2019    HCT 38.4 (L) 04/01/2019    HCT 43.2 03/31/2019     04/02/2019     04/01/2019     04/01/2019       BMP  Recent Labs   Lab Test 04/02/19  0916 04/01/19  2101 04/01/19  0705     --  138   POTASSIUM 3.8  --  3.4   CHLORIDE 107  --  106   CO2 29  --  26   ANIONGAP 2*  --  6   *  --  118*   BUN 18  --   17   CR 0.81 0.77 0.85   JANICE 8.0*  --  8.6       Liver Function Studies -   Recent Labs   Lab Test 03/12/18  0530   PROTTOTAL 6.4*   ALBUMIN 2.4*   BILITOTAL 0.8   ALKPHOS 202*   AST 46*   ALT 35                      Medications:     Current Facility-Administered Medications Ordered in Epic   Medication Dose Route Frequency Last Rate Last Dose     acetaminophen (TYLENOL) tablet 650 mg  650 mg Oral Q6H PRN   650 mg at 03/31/19 2351     acetaminophen (TYLENOL) tablet 975 mg  975 mg Oral Q8H   975 mg at 04/04/19 0514     benzocaine-menthol (CHLORASEPTIC) 6-10 MG lozenge 1 lozenge  1 lozenge Buccal Q1H PRN         calcium carbonate (TUMS) chewable tablet 1,000 mg  1,000 mg Oral Q4H PRN   1,000 mg at 03/29/19 0553     ciprofloxacin (CIPRO) infusion 400 mg  400 mg Intravenous Q12H 200 mL/hr at 04/04/19 0601 400 mg at 04/04/19 0601     diphenhydrAMINE (BENADRYL) injection 25 mg  25 mg Intravenous Q6H PRN         enoxaparin (LOVENOX) injection 40 mg  40 mg Subcutaneous Q24H   40 mg at 04/03/19 1213     HYDROmorphone (DILAUDID) PCA 1 mg/mL OPIOID NAIVE   Intravenous Continuous         lactated ringers BOLUS 500 mL  500 mL Intravenous Q4H PRN         lactated ringers infusion   Intravenous Continuous 125 mL/hr at 04/03/19 2121       lidocaine (LMX4) cream   Topical Q1H PRN         lidocaine 1 % 0.1-1 mL  0.1-1 mL Other Q1H PRN         metroNIDAZOLE (FLAGYL) infusion 500 mg  500 mg Intravenous Q8H 100 mL/hr at 04/04/19 0002 500 mg at 04/04/19 0002     naloxone (NARCAN) injection 0.1-0.4 mg  0.1-0.4 mg Intravenous Q2 Min PRN         ondansetron (ZOFRAN) injection 4 mg  4 mg Intravenous Q6H PRN         oxyCODONE (ROXICODONE) tablet 5 mg  5 mg Oral Q4H PRN         oxyCODONE (ROXICODONE) tablet 5 mg  5 mg Oral Q6H PRN   5 mg at 03/31/19 0938     pantoprazole (PROTONIX) 40 mg IV push injection  40 mg Intravenous Daily with breakfast   40 mg at 04/03/19 1443     prochlorperazine (COMPAZINE) injection 10 mg  10 mg Intravenous Q6H  PRN         sodium chloride (PF) 0.9% PF flush 3 mL  3 mL Intracatheter q1 min prn         sodium chloride (PF) 0.9% PF flush 3 mL  3 mL Intracatheter Q8H   3 mL at 04/04/19 0583     Current Outpatient Medications Ordered in Epic   Medication     enoxaparin (LOVENOX) 40 MG/0.4ML syringe     oxyCODONE (ROXICODONE) 5 MG tablet

## 2019-04-04 NOTE — PLAN OF CARE
5855 Page to CRS PA: pt having ramin red blood NG output.  VSS.     2081Spoke to PA to inform. Came to see pt at bedside. Will speak to Surgeon.

## 2019-04-04 NOTE — PROGRESS NOTES
"[facility] Nurse Inpatient Ostomy Assessment      Assessment of ostomy and needs for:  Transverse  Colostomy      Data:   History:      Per MD note(s):  April 1, 2019 Post-op: Exploratory laparotomy, resection of anastomosis, & colostomy.    Type of ostomy:  Colostomy  Stoma assessment:   ? Size of stoma: ~ 2 1/4\" round; beefy red; edematous and protuberant with RRC intubated to Os.   Mucocutaneous Junction (MCJ):  Intact with sutures  Peristomal skin:  Intact and close to suture line  Abdominal assessment: midline incision and old stoma site covered  ? N/G still in place?:  Yes and began to drain bright red blood drainage; pt tolerated ice chips, PA was notified and returned to assess and will follow up with MD.  Output:  scant , serosanguinous,        Intake/Output Summary (Last 24 hours) at 4/4/2019 1353  Last data filed at 4/4/2019 0955  Gross per 24 hour   Intake 791 ml   Output 1850 ml   Net -1059 ml     Current pouching system:  [employer] 1 pc from OR,     Pain:  Cramping   ? Is pt still on a PCA? Yes    Diet:       Orders Placed This Encounter      Diet      NPO for Medical/Clinical Reasons Except for: Meds, Ice Chips    Labs:   Recent Labs   Lab Test 04/02/19  0916  03/12/18  0530   ALBUMIN  --   --  2.4*   HGB 11.6*   < >  --    INR  --   --  1.09   WBC 8.2   < >  --     < > = values in this interval not displayed.         Intervention:     Patient's chart evaluated.      Assessments done today:  Stoma and pouch change    Education: pt is an experienced ostomate; some newer products were discussed with the pt    Prepared for discharge by: Supplies ordered and Discussed new RX and products for the new stoma    Pt registered for ostomy supply samples? On discharge         Assessment:     Stoma assessment: viable, beefy red, moist, edematous and protuberant with new intact pouch and minimal serousang drainage/ sweat with RRC in Os    Learning needs/ comprehension: very experienced ostomate; " very supportive spouse    Effectiveness of current pouching/ supply plan: TBD         Plan:     Plan:   ? Current pouching system: [employer] 1 pc with ring    Education:  ? Education completed:  Pouch Emptying and Pouch Replacement, How to empty pouch, Removal of pouch, Preparation of new pouch, Cutting out or evaluating a pattern, Applying paste or rings, Applying appliance to abdomen, Peristomal skin care, Diet and hydration / fluid balance, Odor / flatus management and Lifestyle Adjustments; pt expressed needing assistance with pouching at night and information was provided  ? Is pt able to demonstrate: 1. how to empty their pouch?  yes  o Supply company: [employer] is used  o Do WO Nurse recommend home care ? no

## 2019-04-04 NOTE — PLAN OF CARE
VSS. Pt a/o. Up with SBA.   NG tube: 200 ml out.  PCA pump. Pain 2/10.   LR running at 125 ml/hr.

## 2019-04-04 NOTE — PROGRESS NOTES
I agree with the recommendations and interventions as outlined.  TPN recs outlined below in the even this is pursued w/in the next 24 hrs.  Refer to CRS notes.    Geovanna Vu, RD, LD  Clinical Dietitian  3rd floor/ICU: [phone]  All other floors: [phone]  Weekend/holiday: [phone]    Nutrition Brief Note     Recommendations:  -Add Phos, BMP for tomorrow    Reviewed chart and talked with pt today:  - Pt NPO/liquids only x 9 days, except for 1 day of low-fiber diet on 3/31.   - Pt is not passing gas; no nausea    Outputs:  4/2: 50 ml  4/3: 100 ml    Labs reviewed:  K 3.8 (WNL) - 4/2  Phos 2.4 (L) - 4/2  Mag 1.9 (WNL) - 4/2    Medications reviewed:  LR @ 125 ml/hr      ASSESSED NUTRITION NEEDS PER APPROVED PRACTICE GUIDELINES:     Dosing Weight: 75 kg (adjusted) - based on driest wt since admit 90.9 kg on 3/26 and IBW of 70 kg  Estimated Energy Needs: 1875 - 2250 kcals (25 - 30 Kcal/Kg)  Justification: obese, maintenence  Estimated Protein Needs: 113 - 150 grams protein (1.5-2 g pro/Kg)  Justification: post-op, obesity guidelines  and preservation of lean body  Estimated Fluid Needs: 1500 - 1875 mL (1 mL/Kcal)  Justification: maintenance      Per MD, use the following TPN regimen:  TPN D15 AA5 goal rate of 95 ml/hr x 24 hours: 19749 ml to provide 2119 kcal (28 kcal/kg), 114 g protein (1.5 g/kg) and 342 g dex. Daily lipids to provide 500 kcal (24% total kcal). GIR = 3.2.  -->Start at 35 ml/hr.         Cami Huddleston, Dietetic Intern

## 2019-04-05 LAB
ALBUMIN SERPL-MCNC: 2 G/DL (ref 3.4–5)
ANION GAP SERPL CALCULATED.3IONS-SCNC: 4 MMOL/L (ref 3–14)
BUN SERPL-MCNC: 14 MG/DL (ref 7–30)
CALCIUM SERPL-MCNC: 7.9 MG/DL (ref 8.5–10.1)
CHLORIDE SERPL-SCNC: 109 MMOL/L (ref 94–109)
CO2 SERPL-SCNC: 26 MMOL/L (ref 20–32)
CREAT SERPL-MCNC: 0.64 MG/DL (ref 0.66–1.25)
GFR SERPL CREATININE-BSD FRML MDRD: >90 ML/MIN/{1.73_M2}
GLUCOSE BLDC GLUCOMTR-MCNC: 117 MG/DL (ref 70–99)
GLUCOSE BLDC GLUCOMTR-MCNC: 121 MG/DL (ref 70–99)
GLUCOSE SERPL-MCNC: 110 MG/DL (ref 70–99)
MAGNESIUM SERPL-MCNC: 2 MG/DL (ref 1.6–2.3)
PHOSPHATE SERPL-MCNC: 2.3 MG/DL (ref 2.5–4.5)
POTASSIUM SERPL-SCNC: 3.6 MMOL/L (ref 3.4–5.3)
PREALB SERPL IA-MCNC: 8 MG/DL (ref 15–45)
SODIUM SERPL-SCNC: 139 MMOL/L (ref 133–144)

## 2019-04-05 PROCEDURE — 25000125 ZZHC RX 250: Performed by: COLON & RECTAL SURGERY

## 2019-04-05 PROCEDURE — 12000000 ZZH R&B MED SURG/OB

## 2019-04-05 PROCEDURE — 80069 RENAL FUNCTION PANEL: CPT | Performed by: COLON & RECTAL SURGERY

## 2019-04-05 PROCEDURE — 84134 ASSAY OF PREALBUMIN: CPT | Performed by: COLON & RECTAL SURGERY

## 2019-04-05 PROCEDURE — 3E0436Z INTRODUCTION OF NUTRITIONAL SUBSTANCE INTO CENTRAL VEIN, PERCUTANEOUS APPROACH: ICD-10-PCS | Performed by: COLON & RECTAL SURGERY

## 2019-04-05 PROCEDURE — 83735 ASSAY OF MAGNESIUM: CPT | Performed by: COLON & RECTAL SURGERY

## 2019-04-05 PROCEDURE — C9113 INJ PANTOPRAZOLE SODIUM, VIA: HCPCS | Performed by: PHYSICIAN ASSISTANT

## 2019-04-05 PROCEDURE — 36569 INSJ PICC 5 YR+ W/O IMAGING: CPT

## 2019-04-05 PROCEDURE — 27210197 ZZH KIT POWER PICC TRIPLE LUMEN

## 2019-04-05 PROCEDURE — 25000128 H RX IP 250 OP 636: Performed by: COLON & RECTAL SURGERY

## 2019-04-05 PROCEDURE — 25000128 H RX IP 250 OP 636: Performed by: PHYSICIAN ASSISTANT

## 2019-04-05 PROCEDURE — 36415 COLL VENOUS BLD VENIPUNCTURE: CPT | Performed by: COLON & RECTAL SURGERY

## 2019-04-05 PROCEDURE — 25800030 ZZH RX IP 258 OP 636: Performed by: COLON & RECTAL SURGERY

## 2019-04-05 PROCEDURE — 40000901 ZZH STATISTIC WOC PT EDUCATION, 0-15 MIN: Performed by: NURSE PRACTITIONER

## 2019-04-05 PROCEDURE — 00000146 ZZHCL STATISTIC GLUCOSE BY METER IP

## 2019-04-05 PROCEDURE — 25000132 ZZH RX MED GY IP 250 OP 250 PS 637: Performed by: COLON & RECTAL SURGERY

## 2019-04-05 RX ORDER — POTASSIUM CHLORIDE 1500 MG/1
20-40 TABLET, EXTENDED RELEASE ORAL
Status: DISCONTINUED | OUTPATIENT
Start: 2019-04-05 | End: 2019-04-12 | Stop reason: HOSPADM

## 2019-04-05 RX ORDER — NICOTINE POLACRILEX 4 MG
15-30 LOZENGE BUCCAL
Status: DISCONTINUED | OUTPATIENT
Start: 2019-04-05 | End: 2019-04-12 | Stop reason: HOSPADM

## 2019-04-05 RX ORDER — POTASSIUM CHLORIDE 29.8 MG/ML
20 INJECTION INTRAVENOUS
Status: DISCONTINUED | OUTPATIENT
Start: 2019-04-05 | End: 2019-04-12 | Stop reason: HOSPADM

## 2019-04-05 RX ORDER — POTASSIUM CHLORIDE 1.5 G/1.58G
20-40 POWDER, FOR SOLUTION ORAL
Status: DISCONTINUED | OUTPATIENT
Start: 2019-04-05 | End: 2019-04-12 | Stop reason: HOSPADM

## 2019-04-05 RX ORDER — POTASSIUM CL/LIDO/0.9 % NACL 10MEQ/0.1L
10 INTRAVENOUS SOLUTION, PIGGYBACK (ML) INTRAVENOUS
Status: DISCONTINUED | OUTPATIENT
Start: 2019-04-05 | End: 2019-04-12 | Stop reason: HOSPADM

## 2019-04-05 RX ORDER — DEXTROSE MONOHYDRATE 25 G/50ML
25-50 INJECTION, SOLUTION INTRAVENOUS
Status: DISCONTINUED | OUTPATIENT
Start: 2019-04-05 | End: 2019-04-12 | Stop reason: HOSPADM

## 2019-04-05 RX ORDER — POTASSIUM CHLORIDE 7.45 MG/ML
10 INJECTION INTRAVENOUS
Status: DISCONTINUED | OUTPATIENT
Start: 2019-04-05 | End: 2019-04-12 | Stop reason: HOSPADM

## 2019-04-05 RX ORDER — MAGNESIUM SULFATE HEPTAHYDRATE 40 MG/ML
4 INJECTION, SOLUTION INTRAVENOUS EVERY 4 HOURS PRN
Status: DISCONTINUED | OUTPATIENT
Start: 2019-04-05 | End: 2019-04-12 | Stop reason: HOSPADM

## 2019-04-05 RX ADMIN — POTASSIUM PHOSPHATE, MONOBASIC AND POTASSIUM PHOSPHATE, DIBASIC 15 MMOL: 224; 236 INJECTION, SOLUTION INTRAVENOUS at 14:42

## 2019-04-05 RX ADMIN — METRONIDAZOLE 500 MG: 500 INJECTION, SOLUTION INTRAVENOUS at 03:58

## 2019-04-05 RX ADMIN — PANTOPRAZOLE SODIUM 40 MG: 40 INJECTION, POWDER, FOR SOLUTION INTRAVENOUS at 08:13

## 2019-04-05 RX ADMIN — METRONIDAZOLE 500 MG: 500 INJECTION, SOLUTION INTRAVENOUS at 12:16

## 2019-04-05 RX ADMIN — I.V. FAT EMULSION 250 ML: 20 EMULSION INTRAVENOUS at 21:06

## 2019-04-05 RX ADMIN — SODIUM CHLORIDE, POTASSIUM CHLORIDE, SODIUM LACTATE AND CALCIUM CHLORIDE: 600; 310; 30; 20 INJECTION, SOLUTION INTRAVENOUS at 14:42

## 2019-04-05 RX ADMIN — ACETAMINOPHEN 650 MG: 325 TABLET, FILM COATED ORAL at 15:20

## 2019-04-05 RX ADMIN — CIPROFLOXACIN 400 MG: 2 INJECTION, SOLUTION INTRAVENOUS at 18:23

## 2019-04-05 RX ADMIN — SODIUM CHLORIDE, POTASSIUM CHLORIDE, SODIUM LACTATE AND CALCIUM CHLORIDE: 600; 310; 30; 20 INJECTION, SOLUTION INTRAVENOUS at 01:25

## 2019-04-05 RX ADMIN — ENOXAPARIN SODIUM 40 MG: 40 INJECTION SUBCUTANEOUS at 12:19

## 2019-04-05 RX ADMIN — ASCORBIC ACID, VITAMIN A PALMITATE, CHOLECALCIFEROL, THIAMINE HYDROCHLORIDE, RIBOFLAVIN-5 PHOSPHATE SODIUM, PYRIDOXINE HYDROCHLORIDE, NIACINAMIDE, DEXPANTHENOL, ALPHA-TOCOPHEROL ACETATE, VITAMIN K1, FOLIC ACID, BIOTIN, CYANOCOBALAMIN: 200; 3300; 200; 6; 3.6; 6; 40; 15; 10; 150; 600; 60; 5 INJECTION, SOLUTION INTRAVENOUS at 14:50

## 2019-04-05 RX ADMIN — METRONIDAZOLE 500 MG: 500 INJECTION, SOLUTION INTRAVENOUS at 21:02

## 2019-04-05 RX ADMIN — CIPROFLOXACIN 400 MG: 2 INJECTION, SOLUTION INTRAVENOUS at 05:58

## 2019-04-05 ASSESSMENT — ACTIVITIES OF DAILY LIVING (ADL)
ADLS_ACUITY_SCORE: 15

## 2019-04-05 ASSESSMENT — MIFFLIN-ST. JEOR: SCORE: 1716.65

## 2019-04-05 NOTE — PLAN OF CARE
POD 10 from colostomy TD and POD 5 from exp lap with new colostomy.   Vital signs stable, on RA, minimal pain this shift  PCA Dilaudid 0.2/10/1.8, total used 0.4mg  Denies any nausea, NG in place to LIS   BS hypo, ostomy in place, no gas  Up with SBA, not out of bed this shift, using urinal at bedside  PIV /hr, continues IV flagyl, Cipro and Protonix

## 2019-04-05 NOTE — PROCEDURES
[facility]   Procedure Note           Peripherally Inserted Central Line Catheter (PICC):       Gba Holloway  MRN# [id number]   April 5, 2019, 12:22 PM Indication: TPN           Pause for the cause: Consent for catheter placement procedure signed  Time out completed  Patient ID's verified using two distinct indicators  All necessary equipment is present   Type of line to be used: PICC   Full barrier precautions used: Yes   Skin preparation: Chlorehexidine Gluconate 25% with isopropyl alcohol 70%   Date of insertion: April 5, 2019, 11:43 PM   Device type: Triple lumen, valved, 5.0   Catheter brand: Octonotco   Lot number: [id number]   Insertion location: Left basilic vein   Method of placement: Untrasound, venipuncture    Number of attempts: With ultrasound: 1   Without ultrasound: 0   Difficulty threading: No   PICC IV device: Dressing dry and intact  Transparent semmipermeable dressing applied  Chlorhexidine patch  Catheter securement device   Arm circumference: 32cm (10cm from AC)   PICC extremity circumference:   32 cm   Internal length: 44 cm   PICC visible catheter length: 1 cm   Total catheter length: 45 cm   Tip termination: SVC/RA junction   Method of verification: 3CG Tip Confirmation   PICC patency post placement: 3cg Tip Confirmation   Line flush: Line flush documented on the eMAR:    Placement verified by: Registered Nurse   Catheter placed by: Prince Powers RN    Discontinuation form initiated: No   Patient tolerance: Tolerated well      Summary:  This procedure was performed without difficulty and he tolerated the procedure well with no immediate complications.       Recorded by Prince Powers RN     Attestation:  PICC Ok to use, No CXR Need.

## 2019-04-05 NOTE — PLAN OF CARE
Patient alert and oriented. Temp 99.9, tylenol given, increased 101.1, sepsis triggered, lactic 1.0, temp decreased 99.8. Paged Dr. Lan. PCA pump in use, effective. Disconnected so flagyl could be administered due to IV loss, patient okayed. A1 for mobility, ambulated in halls. NPO with ice chips. CMS intact. Faint, hypoactive bowel sounds. Ostomy with small amount of watery output. NG low intermittent. Refused changing of abdominal dressing, changed at end of previous shift. Cipro and Flagyl. Visitors throughout shift.

## 2019-04-05 NOTE — PROGRESS NOTES
CLINICAL NUTRITION SERVICES - REASSESSMENT NOTE      Recommendations Ordered by Registered Dietitian (RD):   - Begin TPN D15 AA5 at 35 ml/hr. Goal rate of 95 ml/hr x 24 hours: 52495 ml to provide 2119 kcal (28 kcal/kg), 114 g protein (1.5 g/kg) and 342 g dex. Daily lipids to provide 500 kcal (24% total kcal). GIR = 3.2.  - Total fluids to equal 125 ml/hr (TPN + LR)   Malnutrition (4/5):  % Weight Loss:  Weight loss does not meet criteria for malnutrition   % Intake:  </= 50% for >/= 5 days (severe malnutrition)  Subcutaneous Fat Loss:  None observed  Muscle Loss:  None observed  Fluid Retention:  Edema around old stoma sight - not nutrition-related      Malnutrition Diagnosis: Patient does not meet two of the above criteria necessary for diagosing for malnutrition       EVALUATION OF PROGRESS TOWARD GOALS   Diet:  NPO    Intake/Tolerance:    - Pt NPO/liquids only x 10 days, except for 1 day of low-fiber diet on 3/31.   - Pt is not passing gas; no nausea      ASSESSED NUTRITION NEEDS PER APPROVED PRACTICE GUIDELINES:     Dosing Weight: 75 kg (adjusted) - based on driest wt since admit 90.9 kg on 3/26 and IBW of 70 kg  Estimated Energy Needs: 1875 - 2250 kcals (25 - 30 Kcal/Kg)  Justification: obese, maintenence  Estimated Protein Needs: 113 - 150 grams protein (1.5-2 g pro/Kg)  Justification: post-op, obesity guidelines  and preservation of lean body  Estimated Fluid Needs: 1500 - 1875 mL (1 mL/Kcal)  Justification: maintenance      NEW FINDINGS:   Ostomy with small amount of watery output    Labs reviewed          -Cr 0.64 (L)  -Phos 2.3 (L)  - K 3.6 (WNL)  - Mag on 4/2 - 1.9 (WNL)    Medications reviewed  -LR at 125 ml/hr      Previous Goals:   Diet advancement > Full Liquid in the next 48 hours.   Evaluation: Not met    Previous Nutrition Diagnosis:   Inadequate protein-energy intake related to limited diets since admit (3/23), altered  GI status with recent surgery, and poor appetite as evidenced by <50% intake  >5 days.  Evaluation: No change      CURRENT NUTRITION DIAGNOSIS  Inadequate protein-energy intake related to limited diets since admit (3/23), altered  GI status with recent surgery, and poor appetite as evidenced by <50% intake >5 days.      INTERVENTIONS  Recommendations / Nutrition Prescription  - Begin TPN D15 AA5 at 35 ml/hr. Goal rate of 95 ml/hr x 24 hours: 26158 ml to provide 2119 kcal (28 kcal/kg), 114 g protein (1.5 g/kg) and 342 g dex. Daily lipids to provide 500 kcal (24% total kcal). GIR = 3.2.        Implementation  PN Schedule: as above.    Collaboration and Referral of Care - discussed pt during IDT rounds.       Goals  TPN to advance to goal within 72 hours.  TPN at goal to meet % estimated needs.       MONITORING AND EVALUATION:  Progress towards goals will be monitored and evaluated per protocol and Practice Guidelines      Cami Huddleston, Dietetic Intern

## 2019-04-05 NOTE — PROGRESS NOTES
"[facility] Nurse Inpatient Ostomy Assessment      Assessment of ostomy and needs for:  Transverse  Colostomy      Data:   History:      Per MD note:  4-1: Exploratory laparotomy, resection of anastomosis, & colostomy.       Type of ostomy:  Colostomy    Stoma assessment:     Size of stoma: ~ 2 1/4\" round; beefy red; edematous and protuberant with RRC to Os.     Mucocutaneous Junction (MCJ): Unable to assess, pouch changed yesterday and not removed today.    Peristomal skin:  Unable to assess.    Abdominal assessment: midline incision and old stoma site covered    N/G still in place?:  Yes.    Output:  small amount serosanguinous.             Intake/Output Summary (Last 24 hours) at 4/4/2019 1353    Last data filed at 4/4/2019 0955        Gross per 24 hour     Intake   791 ml     Output   1850 ml     Net   -1059 ml        Current pouching system:  Coloplast 1 piece with ring.      Pain:  Cramping     Is pt still on a PCA? Yes    Diet:         Orders Placed This Encounter        Diet        NPO for Medical/Clinical Reasons Except for: Meds, Ice Chips       Labs:         Recent Labs   Lab Test 04/02/19  0916   03/12/18  0530   ALBUMIN  --   --  2.4*   HGB 11.6*   < >  --    INR  --   --  1.09   WBC 8.2   < >  --     < > = values in this interval not displayed.          Intervention:     Patient's chart evaluated.      Assessments done today:  Pouch changed yesterday, intact.    Education: pt is an experienced ostomate.    Prepared for discharge by: Supplies have been ordered.    Registered for ostomy supply samples? On discharge          Assessment:     Stoma assessment: viable, beefy red, moist, edematous and protuberan. Minimal serousang     Learning needs/ comprehension: very experienced ostomate;  supportive spouse    Effectiveness of current pouching/ supply plan: TBD.    NG is being continued, TPN to be started today.    Pt extremely tired and \"feels worn out.\" Does not have questions " today.          Plan:     Plan:     Current pouching system: Coloplast 1 pc with ring    Education:    Education completed:  Pouch Emptying and Pouch Replacement, How to empty pouch, Removal of pouch, Preparation of new pouch, Cutting out or evaluating a pattern, Applying paste or rings, Applying appliance to abdomen, Peristomal skin care, Diet and hydration / fluid balance, Odor / flatus management and Lifestyle Adjustments; pt expressed needing assistance with pouching at night and information was provided    Is pt able to demonstrate: 1. how to empty their pouch?  yes  ? Supply company: [employer] is used  ? Do WOC Nurse recommend home care ? no

## 2019-04-05 NOTE — PROGRESS NOTES
Colon and Rectal Surgery Progress Note          Assessment and Plan:   POD # 4 ex lap, colostomy  Still with ileus. Cont NGT for now.  Place PICC and start TPN today. Cont IV ABx    Lillian Acevedo MD  [facility]  Phone: [phone]  Fax: [phone]               Interval History:   Still feels bloated, some abdominal pain.  No real significant output from stoma. Fever to 101 overnight.              Physical Exam:   Vitals were reviewed  Patient Vitals for the past 24 hrs:   BP Temp Temp src Pulse Heart Rate Resp SpO2   04/05/19 0810 141/71 97.2  F (36.2  C) Oral -- 89 20 96 %   04/05/19 0404 -- 98.6  F (37  C) Oral -- 95 -- 95 %   04/05/19 0011 129/72 98.9  F (37.2  C) Oral -- 82 20 94 %   04/04/19 2246 -- 99.4  F (37.4  C) Axillary -- -- -- --   04/04/19 2223 135/73 99.8  F (37.7  C) Oral 89 -- 28 95 %   04/04/19 2149 108/60 101.1  F (38.4  C) Oral -- 96 20 94 %   04/04/19 2053 -- 99.9  F (37.7  C) Oral -- -- -- --   04/04/19 1523 135/75 99.5  F (37.5  C) Oral 96 -- 28 95 %   04/04/19 1130 132/73 -- -- -- -- -- --         Intake/Output Summary (Last 24 hours) at 4/4/2019 0729  Last data filed at 4/4/2019 0600  Gross per 24 hour   Intake 791 ml   Output 1800 ml   Net -1009 ml       Head - Nomocephalic atraumatic  Sclera anicteric  Extremities warm and well perfused  Lungs - breathing non labored,   Abdomen - NG bilious, abdomen moderately distended, appropriately tender,stoma edematous with red rubber catheter in place, bowel sweat in bag  Skin - no rash  Psych - affect appropriate  Neuro - no focal deficits             Data:   All laboratory and imaging data in the past 24 hours reviewed    CBC - pending  Last Comprehensive Metabolic Panel:  Sodium   Date Value Ref Range Status   04/05/2019 139 133 - 144 mmol/L Final     Potassium   Date Value Ref Range Status   04/05/2019 3.6 3.4 - 5.3 mmol/L Final     Chloride   Date Value Ref Range Status   04/05/2019 109 94 - 109 mmol/L Final      Carbon Dioxide   Date Value Ref Range Status   04/05/2019 26 20 - 32 mmol/L Final     Anion Gap   Date Value Ref Range Status   04/05/2019 4 3 - 14 mmol/L Final     Glucose   Date Value Ref Range Status   04/05/2019 110 (H) 70 - 99 mg/dL Final     Urea Nitrogen   Date Value Ref Range Status   04/05/2019 14 7 - 30 mg/dL Final     Creatinine   Date Value Ref Range Status   04/05/2019 0.64 (L) 0.66 - 1.25 mg/dL Final     GFR Estimate   Date Value Ref Range Status   04/05/2019 >90 >60 mL/min/[1.73_m2] Final     Comment:     Non  GFR Calc  Starting 12/18/2018, serum creatinine based estimated GFR (eGFR) will be   calculated using the Chronic Kidney Disease Epidemiology Collaboration   (CKD-EPI) equation.       Calcium   Date Value Ref Range Status   04/05/2019 7.9 (L) 8.5 - 10.1 mg/dL Final              Medications:     Current Facility-Administered Medications Ordered in Epic   Medication Dose Route Frequency Last Rate Last Dose     acetaminophen (TYLENOL) tablet 650 mg  650 mg Oral Q6H PRN   650 mg at 04/04/19 2100     benzocaine-menthol (CHLORASEPTIC) 6-10 MG lozenge 1 lozenge  1 lozenge Buccal Q1H PRN         calcium carbonate (TUMS) chewable tablet 1,000 mg  1,000 mg Oral Q4H PRN   1,000 mg at 03/29/19 0553     ciprofloxacin (CIPRO) infusion 400 mg  400 mg Intravenous Q12H 200 mL/hr at 04/05/19 0558 400 mg at 04/05/19 0558     dextrose 10 % 1,000 mL infusion   Intravenous Continuous PRN         diphenhydrAMINE (BENADRYL) injection 25 mg  25 mg Intravenous Q6H PRN         enoxaparin (LOVENOX) injection 40 mg  40 mg Subcutaneous Q24H   40 mg at 04/04/19 1225     HYDROmorphone (DILAUDID) PCA 1 mg/mL OPIOID NAIVE   Intravenous Continuous         lactated ringers BOLUS 500 mL  500 mL Intravenous Q4H PRN         lactated ringers infusion   Intravenous Continuous 125 mL/hr at 04/05/19 0125       lidocaine (LMX4) cream   Topical Q1H PRN         lidocaine 1 % 0.1-1 mL  0.1-1 mL Other Q1H PRN          magnesium sulfate 2 g in NS intermittent infusion (PharMEDium or [facility] Cmpd)  2 g Intravenous Daily PRN         magnesium sulfate 4 g in 100 mL sterile water (premade)  4 g Intravenous Q4H PRN         metroNIDAZOLE (FLAGYL) infusion 500 mg  500 mg Intravenous Q8H 100 mL/hr at 04/05/19 0358 500 mg at 04/05/19 0358     naloxone (NARCAN) injection 0.1-0.4 mg  0.1-0.4 mg Intravenous Q2 Min PRN         ondansetron (ZOFRAN) injection 4 mg  4 mg Intravenous Q6H PRN         oxyCODONE (ROXICODONE) tablet 5 mg  5 mg Oral Q4H PRN         pantoprazole (PROTONIX) 40 mg IV push injection  40 mg Intravenous Daily with breakfast   40 mg at 04/05/19 0813     potassium chloride (KLOR-CON) Packet 20-40 mEq  20-40 mEq Oral or Feeding Tube Q2H PRN         potassium chloride 10 mEq in 100 mL intermittent infusion with 10 mg lidocaine  10 mEq Intravenous Q1H PRN         potassium chloride 10 mEq in 100 mL sterile water intermittent infusion (premix)  10 mEq Intravenous Q1H PRN         potassium chloride 20 mEq in 50 mL intermittent infusion  20 mEq Intravenous Q1H PRN         potassium chloride ER (K-DUR/KLOR-CON M) CR tablet 20-40 mEq  20-40 mEq Oral Q2H PRN         potassium phosphate 15 mmol in D5W 250 mL intermittent infusion  15 mmol Intravenous Daily PRN         potassium phosphate 20 mmol in D5W 250 mL intermittent infusion  20 mmol Intravenous Q6H PRN         potassium phosphate 20 mmol in D5W 500 mL intermittent infusion  20 mmol Intravenous Q6H PRN         potassium phosphate 25 mmol in D5W 500 mL intermittent infusion  25 mmol Intravenous Q8H PRN         prochlorperazine (COMPAZINE) injection 10 mg  10 mg Intravenous Q6H PRN         sodium chloride (PF) 0.9% PF flush 3 mL  3 mL Intracatheter q1 min prn         sodium chloride (PF) 0.9% PF flush 3 mL  3 mL Intracatheter Q8H   3 mL at 04/04/19 1954     Current Outpatient Medications Ordered in Epic   Medication     enoxaparin (LOVENOX) 40 MG/0.4ML syringe     oxyCODONE (ROXICODONE)  5 MG tablet

## 2019-04-05 NOTE — PROVIDER NOTIFICATION
Prior to the start of the PICC procedure and with procedural staff participation, I verbally confirmed the patient s identity using two indicators, relevant allergies, that the procedure was appropriate and matched the consent or emergent situation, and that the correct equipment/implants were available. Immediately prior to starting the procedure I conducted the Time Out with the procedural staff and re-confirmed the patient s name, procedure, and site/side. (The Joint Commission universal protocol was followed.)  Yes    Sedation (Moderate or Deep): None  With Arabella Whatley RN

## 2019-04-05 NOTE — PLAN OF CARE
Ambulatory Status:  Pt up standby. Ambulated in phipps x1, up in chair.  Orientation: a/o  VS:  tmax 100.2, tylenol given, recheck 99.7  Pain:  abd-PCA in place   Resp: LS clear.   GI:  denies nausea.  NG in place, ice chips only. Faint BS.  No gas or stool in ostomy.  :  voiding without difficulty   Skin:  wound dressing/packing changed this shift. Right arm IV infiltrated this am. Right arm marked, IV removed. Arm elevated and ice applied. Nursing supervisor notified and pictures taken. Will need to be retaken at the 24hr anette at 1100 on 4/6.   Tx:  Flagyl and Cipro. TPN started this shift.   Labs:  . Phos 2.3-replaced this shift, recheck in the am   Consults:  CRS and WOC   Disposition:  tbd

## 2019-04-06 LAB
ALBUMIN SERPL-MCNC: 1.9 G/DL (ref 3.4–5)
ALP SERPL-CCNC: 64 U/L (ref 40–150)
ALT SERPL W P-5'-P-CCNC: 11 U/L (ref 0–70)
ANION GAP SERPL CALCULATED.3IONS-SCNC: 4 MMOL/L (ref 3–14)
AST SERPL W P-5'-P-CCNC: 21 U/L (ref 0–45)
BASOPHILS # BLD AUTO: 0 10E9/L (ref 0–0.2)
BASOPHILS NFR BLD AUTO: 0 %
BILIRUB DIRECT SERPL-MCNC: 0.1 MG/DL (ref 0–0.2)
BILIRUB SERPL-MCNC: 0.4 MG/DL (ref 0.2–1.3)
BUN SERPL-MCNC: 9 MG/DL (ref 7–30)
CALCIUM SERPL-MCNC: 7.6 MG/DL (ref 8.5–10.1)
CHLORIDE SERPL-SCNC: 106 MMOL/L (ref 94–109)
CO2 SERPL-SCNC: 28 MMOL/L (ref 20–32)
CREAT SERPL-MCNC: 0.59 MG/DL (ref 0.66–1.25)
DIFFERENTIAL METHOD BLD: ABNORMAL
EOSINOPHIL # BLD AUTO: 0.3 10E9/L (ref 0–0.7)
EOSINOPHIL NFR BLD AUTO: 3 %
ERYTHROCYTE [DISTWIDTH] IN BLOOD BY AUTOMATED COUNT: 14 % (ref 10–15)
GFR SERPL CREATININE-BSD FRML MDRD: >90 ML/MIN/{1.73_M2}
GLUCOSE BLDC GLUCOMTR-MCNC: 106 MG/DL (ref 70–99)
GLUCOSE BLDC GLUCOMTR-MCNC: 112 MG/DL (ref 70–99)
GLUCOSE BLDC GLUCOMTR-MCNC: 137 MG/DL (ref 70–99)
GLUCOSE BLDC GLUCOMTR-MCNC: 139 MG/DL (ref 70–99)
GLUCOSE BLDC GLUCOMTR-MCNC: 142 MG/DL (ref 70–99)
GLUCOSE BLDC GLUCOMTR-MCNC: 143 MG/DL (ref 70–99)
GLUCOSE SERPL-MCNC: 146 MG/DL (ref 70–99)
HCT VFR BLD AUTO: 34.5 % (ref 40–53)
HGB BLD-MCNC: 11.5 G/DL (ref 13.3–17.7)
INR PPP: 1.21 (ref 0.86–1.14)
LYMPHOCYTES # BLD AUTO: 1.1 10E9/L (ref 0.8–5.3)
LYMPHOCYTES NFR BLD AUTO: 12 %
MAGNESIUM SERPL-MCNC: 2 MG/DL (ref 1.6–2.3)
MCH RBC QN AUTO: 33.5 PG (ref 26.5–33)
MCHC RBC AUTO-ENTMCNC: 33.3 G/DL (ref 31.5–36.5)
MCV RBC AUTO: 101 FL (ref 78–100)
MONOCYTES # BLD AUTO: 0.7 10E9/L (ref 0–1.3)
MONOCYTES NFR BLD AUTO: 8 %
NEUTROPHILS # BLD AUTO: 7 10E9/L (ref 1.6–8.3)
NEUTROPHILS NFR BLD AUTO: 77 %
PHOSPHATE SERPL-MCNC: 2.4 MG/DL (ref 2.5–4.5)
PLATELET # BLD AUTO: 227 10E9/L (ref 150–450)
PLATELET # BLD EST: ABNORMAL 10*3/UL
POTASSIUM SERPL-SCNC: 3.6 MMOL/L (ref 3.4–5.3)
PROT SERPL-MCNC: 5.6 G/DL (ref 6.8–8.8)
RBC # BLD AUTO: 3.43 10E12/L (ref 4.4–5.9)
RBC MORPH BLD: ABNORMAL
SODIUM SERPL-SCNC: 138 MMOL/L (ref 133–144)
TRIGL SERPL-MCNC: 112 MG/DL
WBC # BLD AUTO: 9.1 10E9/L (ref 4–11)

## 2019-04-06 PROCEDURE — 00000146 ZZHCL STATISTIC GLUCOSE BY METER IP

## 2019-04-06 PROCEDURE — 25800030 ZZH RX IP 258 OP 636: Performed by: COLON & RECTAL SURGERY

## 2019-04-06 PROCEDURE — 84478 ASSAY OF TRIGLYCERIDES: CPT | Performed by: COLON & RECTAL SURGERY

## 2019-04-06 PROCEDURE — 85610 PROTHROMBIN TIME: CPT | Performed by: COLON & RECTAL SURGERY

## 2019-04-06 PROCEDURE — C9113 INJ PANTOPRAZOLE SODIUM, VIA: HCPCS | Performed by: PHYSICIAN ASSISTANT

## 2019-04-06 PROCEDURE — 83735 ASSAY OF MAGNESIUM: CPT | Performed by: COLON & RECTAL SURGERY

## 2019-04-06 PROCEDURE — 25000131 ZZH RX MED GY IP 250 OP 636 PS 637: Performed by: COLON & RECTAL SURGERY

## 2019-04-06 PROCEDURE — 85025 COMPLETE CBC W/AUTO DIFF WBC: CPT | Performed by: COLON & RECTAL SURGERY

## 2019-04-06 PROCEDURE — 12000000 ZZH R&B MED SURG/OB

## 2019-04-06 PROCEDURE — 82248 BILIRUBIN DIRECT: CPT | Performed by: COLON & RECTAL SURGERY

## 2019-04-06 PROCEDURE — 25000128 H RX IP 250 OP 636: Performed by: COLON & RECTAL SURGERY

## 2019-04-06 PROCEDURE — 25000128 H RX IP 250 OP 636: Performed by: PHYSICIAN ASSISTANT

## 2019-04-06 PROCEDURE — 84100 ASSAY OF PHOSPHORUS: CPT | Performed by: COLON & RECTAL SURGERY

## 2019-04-06 PROCEDURE — 25000132 ZZH RX MED GY IP 250 OP 250 PS 637: Performed by: COLON & RECTAL SURGERY

## 2019-04-06 PROCEDURE — 80053 COMPREHEN METABOLIC PANEL: CPT | Performed by: COLON & RECTAL SURGERY

## 2019-04-06 PROCEDURE — 25000125 ZZHC RX 250: Performed by: COLON & RECTAL SURGERY

## 2019-04-06 RX ADMIN — METRONIDAZOLE 500 MG: 500 INJECTION, SOLUTION INTRAVENOUS at 20:18

## 2019-04-06 RX ADMIN — CIPROFLOXACIN 400 MG: 2 INJECTION, SOLUTION INTRAVENOUS at 18:00

## 2019-04-06 RX ADMIN — Medication 1 LOZENGE: at 09:26

## 2019-04-06 RX ADMIN — METRONIDAZOLE 500 MG: 500 INJECTION, SOLUTION INTRAVENOUS at 04:56

## 2019-04-06 RX ADMIN — INSULIN ASPART 1 UNITS: 100 INJECTION, SOLUTION INTRAVENOUS; SUBCUTANEOUS at 10:14

## 2019-04-06 RX ADMIN — PANTOPRAZOLE SODIUM 40 MG: 40 INJECTION, POWDER, FOR SOLUTION INTRAVENOUS at 09:03

## 2019-04-06 RX ADMIN — CIPROFLOXACIN 400 MG: 2 INJECTION, SOLUTION INTRAVENOUS at 06:48

## 2019-04-06 RX ADMIN — SODIUM CHLORIDE, POTASSIUM CHLORIDE, SODIUM LACTATE AND CALCIUM CHLORIDE: 600; 310; 30; 20 INJECTION, SOLUTION INTRAVENOUS at 15:35

## 2019-04-06 RX ADMIN — I.V. FAT EMULSION 250 ML: 20 EMULSION INTRAVENOUS at 20:18

## 2019-04-06 RX ADMIN — POTASSIUM PHOSPHATE, MONOBASIC AND POTASSIUM PHOSPHATE, DIBASIC 15 MMOL: 224; 236 INJECTION, SOLUTION INTRAVENOUS at 10:47

## 2019-04-06 RX ADMIN — ENOXAPARIN SODIUM 40 MG: 40 INJECTION SUBCUTANEOUS at 12:38

## 2019-04-06 RX ADMIN — INSULIN ASPART 1 UNITS: 100 INJECTION, SOLUTION INTRAVENOUS; SUBCUTANEOUS at 14:14

## 2019-04-06 RX ADMIN — METRONIDAZOLE 500 MG: 500 INJECTION, SOLUTION INTRAVENOUS at 12:38

## 2019-04-06 ASSESSMENT — ACTIVITIES OF DAILY LIVING (ADL)
ADLS_ACUITY_SCORE: 15

## 2019-04-06 ASSESSMENT — MIFFLIN-ST. JEOR: SCORE: 1756.57

## 2019-04-06 NOTE — PROGRESS NOTES
COLON & RECTAL SURGERY PROGRESS NOTE    POD# 5 exploratory laparotomy, resection of colocolonic anastomosis, end transverse colostomy    Events: none overnight    Subjective: feeling ok, no bloating or nausea, getting out of bed.    Vitals:  Vitals:    04/05/19 2204 04/06/19 0017 04/06/19 0622 04/06/19 0857   BP:  152/88 143/86 135/83   BP Location:  Right arm Right arm Right arm   Pulse:       Resp: 18 24 20 18   Temp: 99.4  F (37.4  C) 99.7  F (37.6  C) 98.9  F (37.2  C) 97.9  F (36.6  C)   TempSrc: Oral Axillary Oral Oral   SpO2:  93% 93% 94%   Weight:   96.7 kg (213 lb 3.2 oz)    Height:         I/O:  I/O last 3 completed shifts:  In: 2985 [P.O.:50; I.V.:2187]  Out: 1315 [Urine:775; Emesis/NG output:500; Stool:40]    Physical exam:  Awake alert no distress  Breathing comfortably on room air  Abdomen soft, non tender, non distended  Incisions clean dry intact, colostomy pink with some liquid stool in bag, RR in place  Extremities warm well perfused    Labs:  Recent Labs   Lab 04/06/19  0500 04/05/19  0749 04/04/19  0648 04/02/19  0916 04/01/19  2101 04/01/19  0705  03/31/19  1043   WBC 9.1  --   --  8.2  --  10.0  --  9.0   HGB 11.5*  --   --  11.6*  --  12.8*  --  13.6     --  247 165 169 178  --  171    139  --  138  --  138  --   --    POTASSIUM 3.6 3.6  --  3.8  --  3.4  --   --    CHLORIDE 106 109  --  107  --  106  --   --    CO2 28 26  --  29  --  26  --   --    BUN 9 14  --  18  --  17  --   --    CR 0.59* 0.64*  --  0.81 0.77 0.85   < >  --    * 110*  --  117*  --  118*  --   --    MAG 2.0 2.0  --  1.9  --  1.7  --   --    PHOS 2.4* 2.3*  --  2.4*  --   --   --   --    INR 1.21*  --   --   --   --   --   --   --    AST 21  --   --   --   --   --   --   --    ALT 11  --   --   --   --   --   --   --     < > = values in this interval not displayed.       Assessment: Gab Holloway is a 58 year old man with history of obstructing colon cancer who had undergone left colectomy 2/2018  with transverse colostomy, which was reversed on 3/26/19 and complicated by an anastomotic leak, he is now postoperative day 5 status post exploratory laparotomy, resection of anastomosis, and transverse colostomy. He has had slow return of bowel function.    Plan:  - NGT clamp trial today, will remove in evening if he tolerates and has low residual volume  - Continue RR in stoma  - Continue TPN, abx  - Lovenox  - Packing to prior site     Discussed with Dr. Alison Welch MD  Colon and Rectal Surgery Fellow  [facility]  [facility]  Pager: [phone]

## 2019-04-06 NOTE — PLAN OF CARE
Orientation: Alert and oriented x4  VSS. 93% on RA.   Tele: N/A. HR 91.   LS: Clear,denies SOB/CARBONE  GI: BS audible/hypoactive x4, abdomen tender to touch, ostomy with small serosanguinous output, stoma red and protruding with RRC, NG to LIS. Unable Passing gas. No BM this shift. Denies N/V.   : Adequate urine output. Yes  Skin: Incision on abdomen covered with staples, CDI. Ostomy.  Activity: SBA. Pt slept comfortably throughout shift.   Pain: 0/10  Plan: Pain control, supportive cares, NG to LIS, TPN, IV abx, discharge TBD. Continue with current cares.

## 2019-04-06 NOTE — PLAN OF CARE
Ambulatory Status:  Pt up standby.  Orientation: a/o  VS:  Tmax 99.1  Pain:  minimal abd pain-pca in use  Resp: LS clear. Cough infrequent prod.   GI:  denies nausea.  NPO with ices/meds. NG clamped at 1240, clamped for 6 hours, hooked to suction for 30min with 50ml output. MD paged with ok given to remove NG. Pt hesitant to remove NG.  Hypo BS.  Small amount of gas and stool in ostomy.  :  voiding tea colored urine without difficulty   Skin:  previous ostomy site packing changed this shift. Incision dressing changed this shift   Tx:  Cipro and flagyl. TPN and Lipids    Labs:  , 142 and 106, Mg 2.0, Phos 2.4; both replaced this shift, recheck in the AM   Disposition:  tbd

## 2019-04-07 LAB
ANION GAP SERPL CALCULATED.3IONS-SCNC: 4 MMOL/L (ref 3–14)
BUN SERPL-MCNC: 8 MG/DL (ref 7–30)
CALCIUM SERPL-MCNC: 7.6 MG/DL (ref 8.5–10.1)
CHLORIDE SERPL-SCNC: 108 MMOL/L (ref 94–109)
CO2 SERPL-SCNC: 27 MMOL/L (ref 20–32)
CREAT SERPL-MCNC: 0.65 MG/DL (ref 0.66–1.25)
GFR SERPL CREATININE-BSD FRML MDRD: >90 ML/MIN/{1.73_M2}
GLUCOSE BLDC GLUCOMTR-MCNC: 110 MG/DL (ref 70–99)
GLUCOSE BLDC GLUCOMTR-MCNC: 111 MG/DL (ref 70–99)
GLUCOSE BLDC GLUCOMTR-MCNC: 120 MG/DL (ref 70–99)
GLUCOSE BLDC GLUCOMTR-MCNC: 120 MG/DL (ref 70–99)
GLUCOSE BLDC GLUCOMTR-MCNC: 133 MG/DL (ref 70–99)
GLUCOSE BLDC GLUCOMTR-MCNC: 139 MG/DL (ref 70–99)
GLUCOSE SERPL-MCNC: 124 MG/DL (ref 70–99)
MAGNESIUM SERPL-MCNC: 2.2 MG/DL (ref 1.6–2.3)
PHOSPHATE SERPL-MCNC: 2.7 MG/DL (ref 2.5–4.5)
PLATELET # BLD AUTO: 236 10E9/L (ref 150–450)
POTASSIUM SERPL-SCNC: 3.7 MMOL/L (ref 3.4–5.3)
SODIUM SERPL-SCNC: 139 MMOL/L (ref 133–144)

## 2019-04-07 PROCEDURE — 85049 AUTOMATED PLATELET COUNT: CPT | Performed by: COLON & RECTAL SURGERY

## 2019-04-07 PROCEDURE — 12000000 ZZH R&B MED SURG/OB

## 2019-04-07 PROCEDURE — C9113 INJ PANTOPRAZOLE SODIUM, VIA: HCPCS | Performed by: PHYSICIAN ASSISTANT

## 2019-04-07 PROCEDURE — 25000128 H RX IP 250 OP 636: Performed by: COLON & RECTAL SURGERY

## 2019-04-07 PROCEDURE — 25000128 H RX IP 250 OP 636: Performed by: SURGERY

## 2019-04-07 PROCEDURE — 25800030 ZZH RX IP 258 OP 636: Performed by: COLON & RECTAL SURGERY

## 2019-04-07 PROCEDURE — 80048 BASIC METABOLIC PNL TOTAL CA: CPT | Performed by: COLON & RECTAL SURGERY

## 2019-04-07 PROCEDURE — 00000146 ZZHCL STATISTIC GLUCOSE BY METER IP

## 2019-04-07 PROCEDURE — 83735 ASSAY OF MAGNESIUM: CPT | Performed by: COLON & RECTAL SURGERY

## 2019-04-07 PROCEDURE — 25000132 ZZH RX MED GY IP 250 OP 250 PS 637: Performed by: SURGERY

## 2019-04-07 PROCEDURE — 25000128 H RX IP 250 OP 636: Performed by: PHYSICIAN ASSISTANT

## 2019-04-07 PROCEDURE — 25000125 ZZHC RX 250: Performed by: COLON & RECTAL SURGERY

## 2019-04-07 PROCEDURE — 84100 ASSAY OF PHOSPHORUS: CPT | Performed by: COLON & RECTAL SURGERY

## 2019-04-07 RX ORDER — LANOLIN ALCOHOL/MO/W.PET/CERES
3 CREAM (GRAM) TOPICAL
Status: DISCONTINUED | OUTPATIENT
Start: 2019-04-07 | End: 2019-04-12 | Stop reason: HOSPADM

## 2019-04-07 RX ADMIN — ALTEPLASE 2 MG: 2.2 INJECTION, POWDER, LYOPHILIZED, FOR SOLUTION INTRAVENOUS at 14:45

## 2019-04-07 RX ADMIN — METRONIDAZOLE 500 MG: 500 INJECTION, SOLUTION INTRAVENOUS at 13:33

## 2019-04-07 RX ADMIN — CIPROFLOXACIN 400 MG: 2 INJECTION, SOLUTION INTRAVENOUS at 18:32

## 2019-04-07 RX ADMIN — MELATONIN TAB 3 MG 3 MG: 3 TAB at 22:16

## 2019-04-07 RX ADMIN — ASCORBIC ACID, VITAMIN A PALMITATE, CHOLECALCIFEROL, THIAMINE HYDROCHLORIDE, RIBOFLAVIN-5 PHOSPHATE SODIUM, PYRIDOXINE HYDROCHLORIDE, NIACINAMIDE, DEXPANTHENOL, ALPHA-TOCOPHEROL ACETATE, VITAMIN K1, FOLIC ACID, BIOTIN, CYANOCOBALAMIN: 200; 3300; 200; 6; 3.6; 6; 40; 15; 10; 150; 600; 60; 5 INJECTION, SOLUTION INTRAVENOUS at 16:35

## 2019-04-07 RX ADMIN — METRONIDAZOLE 500 MG: 500 INJECTION, SOLUTION INTRAVENOUS at 04:18

## 2019-04-07 RX ADMIN — SODIUM CHLORIDE, POTASSIUM CHLORIDE, SODIUM LACTATE AND CALCIUM CHLORIDE: 600; 310; 30; 20 INJECTION, SOLUTION INTRAVENOUS at 15:50

## 2019-04-07 RX ADMIN — PANTOPRAZOLE SODIUM 40 MG: 40 INJECTION, POWDER, FOR SOLUTION INTRAVENOUS at 09:43

## 2019-04-07 RX ADMIN — CIPROFLOXACIN 400 MG: 2 INJECTION, SOLUTION INTRAVENOUS at 06:05

## 2019-04-07 RX ADMIN — I.V. FAT EMULSION 250 ML: 20 EMULSION INTRAVENOUS at 20:14

## 2019-04-07 RX ADMIN — ENOXAPARIN SODIUM 40 MG: 40 INJECTION SUBCUTANEOUS at 13:31

## 2019-04-07 ASSESSMENT — ACTIVITIES OF DAILY LIVING (ADL)
ADLS_ACUITY_SCORE: 15

## 2019-04-07 ASSESSMENT — MIFFLIN-ST. JEOR: SCORE: 1722.55

## 2019-04-07 NOTE — DOWNTIME EVENT NOTE
The EMR was down for 3 hours on 4/7/2019.    Radha MAY was responsible for completing the paper charting during this time period.     The following information was re-entered into the system by Radha Pineda: MAR    The following information will remain in the paper chart: N/A    Radha Pineda  4/7/2019

## 2019-04-07 NOTE — PROGRESS NOTES
Colorectal Surgery Progress Note    POD # 6  Interval History:  No acute events overnight. Tolerated NGT out.  Passing flatus per colostomy.  Hungry    Physical Exam:   Temp:  [98.3  F (36.8  C)-99.3  F (37.4  C)] 99.3  F (37.4  C)  Heart Rate:  [87-91] 88  Resp:  [18-28] 24  BP: (125-137)/(73-85) 126/74  SpO2:  [92 %-96 %] 94 %  General: Alert, oriented, appears comfortable, NAD.  Respiratory: breathing non labored  Abdomen: Abdomen is soft, appropriately tender, moderately distended. Incision is clean, dry and intact without signs of infection  Stoma with edema and sweat    Data:   All laboratory and imaging data in the past 24 hours reviewed  I/O last 3 completed shifts:  In: 3549 [I.V.:2527]  Out: 3600 [Urine:3300; Emesis/NG output:250; Stool:50]  Recent Labs   Lab Test 04/07/19  0605 04/06/19  0500 04/04/19  0648 04/02/19  0916  04/01/19  0705  03/12/18  0530  03/07/18  1020   WBC  --  9.1  --  8.2  --  10.0   < >  --   --  7.9   HGB  --  11.5*  --  11.6*  --  12.8*   < >  --   --  11.2*    227 247 165   < > 178   < >  --    < > 204   INR  --  1.21*  --   --   --   --   --  1.09  --  1.12    < > = values in this interval not displayed.      Recent Labs   Lab Test 04/07/19  0605 04/06/19  0500 04/05/19  0749    138 139   POTASSIUM 3.7 3.6 3.6   CHLORIDE 108 106 109   CO2 27 28 26   BUN 8 9 14   CR 0.65* 0.59* 0.64*   ANIONGAP 4 4 4   JANICE 7.6* 7.6* 7.9*   * 146* 110*        Recent Labs   Lab Test 04/06/19  0500   PROTTOTAL 5.6*   ALBUMIN 1.9*   BILITOTAL 0.4   ALKPHOS 64   AST 21   ALT 11       Assessment and Plan:     Clears.  Decrease IVF given edema    Sydney Fariha Alison, MD   Colorectal Surgery  [phone] [facility]  [phone] Office/Call service

## 2019-04-07 NOTE — PLAN OF CARE
Pt with visitors all evening and walked halls x1 independently and wife asleep at bedside overnight, PCA without use and denying pain, VSS, NG pulled at HS per pt request and tolerated well, continues just ice chips and without any nausea overnight - sleeping well. Continues flagyl and cipro via triple lumen PICC along with LR/TPN/lipids. BGs 112 / 133 without sliding scale given. Bowel sounds hypoactive and positive gas and minimal liquid stool via ostomy - red russell in place and stoma edematous and red. Expected discharge TBD - surgery following.

## 2019-04-07 NOTE — PLAN OF CARE
Cared for patient from [id number]. Patient remains hospitalized following ostomy takedown, currently POD #12, as well as exp lap with new ostomy placement,currently POD #6. Pain controlled with Dilaudid PCA. Bowel sounds  active this evening. Passing gas and stool via ostomy, as well as via rectum. Denies nausea. Currently  tolerating clear liquid diet, with IVF and TPN. Incision dressing and stoma site CDI. Continues on IV abx. Voiding  without difficulty. Ambulating with standby assist.

## 2019-04-07 NOTE — PLAN OF CARE
Ambulatory Status:  Pt up standby/ind. Up in chair most of shift.  Orientation: a/o  VS:  tmax 100.0, declined any interventions.   Pain:  minimal abd pain-pca in use   Resp: LS clear.   GI:  denies nausea.  Wesly.clear liquid diet. Hypo BS.  Small amount of liquid stool and gas in ostomy.   :  voiding without difficulty   Skin:  wound dressing changed this shift, small amount of bleeding from midline incision, dressing changed   Tx:  flagyl and cipro. TPN and Lipids  Labs:   and 110  Consults:  CRS and WOC   Disposition:  tbd

## 2019-04-08 ENCOUNTER — APPOINTMENT (OUTPATIENT)
Dept: GENERAL RADIOLOGY | Facility: CLINIC | Age: 59
DRG: 329 | End: 2019-04-08
Attending: COLON & RECTAL SURGERY
Payer: COMMERCIAL

## 2019-04-08 LAB
ALBUMIN SERPL-MCNC: 2.1 G/DL (ref 3.4–5)
ALP SERPL-CCNC: 106 U/L (ref 40–150)
ALT SERPL W P-5'-P-CCNC: 12 U/L (ref 0–70)
ANION GAP SERPL CALCULATED.3IONS-SCNC: 3 MMOL/L (ref 3–14)
AST SERPL W P-5'-P-CCNC: 30 U/L (ref 0–45)
BILIRUB SERPL-MCNC: 0.4 MG/DL (ref 0.2–1.3)
BUN SERPL-MCNC: 11 MG/DL (ref 7–30)
CALCIUM SERPL-MCNC: 7.8 MG/DL (ref 8.5–10.1)
CHLORIDE SERPL-SCNC: 108 MMOL/L (ref 94–109)
CO2 SERPL-SCNC: 26 MMOL/L (ref 20–32)
CREAT SERPL-MCNC: 0.57 MG/DL (ref 0.66–1.25)
GFR SERPL CREATININE-BSD FRML MDRD: >90 ML/MIN/{1.73_M2}
GLUCOSE BLDC GLUCOMTR-MCNC: 117 MG/DL (ref 70–99)
GLUCOSE BLDC GLUCOMTR-MCNC: 118 MG/DL (ref 70–99)
GLUCOSE BLDC GLUCOMTR-MCNC: 121 MG/DL (ref 70–99)
GLUCOSE BLDC GLUCOMTR-MCNC: 126 MG/DL (ref 70–99)
GLUCOSE BLDC GLUCOMTR-MCNC: 158 MG/DL (ref 70–99)
GLUCOSE SERPL-MCNC: 144 MG/DL (ref 70–99)
INR PPP: 1.17 (ref 0.86–1.14)
MAGNESIUM SERPL-MCNC: 2.2 MG/DL (ref 1.6–2.3)
PHOSPHATE SERPL-MCNC: 2.9 MG/DL (ref 2.5–4.5)
POTASSIUM SERPL-SCNC: 3.9 MMOL/L (ref 3.4–5.3)
PREALB SERPL IA-MCNC: 12 MG/DL (ref 15–45)
PROT SERPL-MCNC: 5.8 G/DL (ref 6.8–8.8)
SODIUM SERPL-SCNC: 137 MMOL/L (ref 133–144)
TRIGL SERPL-MCNC: 120 MG/DL

## 2019-04-08 PROCEDURE — 71045 X-RAY EXAM CHEST 1 VIEW: CPT

## 2019-04-08 PROCEDURE — 12000000 ZZH R&B MED SURG/OB

## 2019-04-08 PROCEDURE — 84134 ASSAY OF PREALBUMIN: CPT | Performed by: COLON & RECTAL SURGERY

## 2019-04-08 PROCEDURE — 80053 COMPREHEN METABOLIC PANEL: CPT | Performed by: COLON & RECTAL SURGERY

## 2019-04-08 PROCEDURE — 00000146 ZZHCL STATISTIC GLUCOSE BY METER IP

## 2019-04-08 PROCEDURE — 25000125 ZZHC RX 250: Performed by: COLON & RECTAL SURGERY

## 2019-04-08 PROCEDURE — 40000902 ZZH STATISTIC WOC PT EDUCATION, 16-30 MIN

## 2019-04-08 PROCEDURE — 25000128 H RX IP 250 OP 636: Performed by: COLON & RECTAL SURGERY

## 2019-04-08 PROCEDURE — 83735 ASSAY OF MAGNESIUM: CPT | Performed by: COLON & RECTAL SURGERY

## 2019-04-08 PROCEDURE — C9113 INJ PANTOPRAZOLE SODIUM, VIA: HCPCS | Performed by: PHYSICIAN ASSISTANT

## 2019-04-08 PROCEDURE — 84478 ASSAY OF TRIGLYCERIDES: CPT | Performed by: COLON & RECTAL SURGERY

## 2019-04-08 PROCEDURE — 84100 ASSAY OF PHOSPHORUS: CPT | Performed by: COLON & RECTAL SURGERY

## 2019-04-08 PROCEDURE — 85610 PROTHROMBIN TIME: CPT | Performed by: COLON & RECTAL SURGERY

## 2019-04-08 PROCEDURE — 25000128 H RX IP 250 OP 636: Performed by: PHYSICIAN ASSISTANT

## 2019-04-08 PROCEDURE — 36415 COLL VENOUS BLD VENIPUNCTURE: CPT | Performed by: COLON & RECTAL SURGERY

## 2019-04-08 PROCEDURE — 25000132 ZZH RX MED GY IP 250 OP 250 PS 637: Performed by: SURGERY

## 2019-04-08 RX ADMIN — I.V. FAT EMULSION 250 ML: 20 EMULSION INTRAVENOUS at 19:57

## 2019-04-08 RX ADMIN — PANTOPRAZOLE SODIUM 40 MG: 40 INJECTION, POWDER, FOR SOLUTION INTRAVENOUS at 08:51

## 2019-04-08 RX ADMIN — ENOXAPARIN SODIUM 40 MG: 40 INJECTION SUBCUTANEOUS at 12:35

## 2019-04-08 RX ADMIN — METRONIDAZOLE 500 MG: 500 INJECTION, SOLUTION INTRAVENOUS at 17:19

## 2019-04-08 RX ADMIN — CIPROFLOXACIN 400 MG: 2 INJECTION, SOLUTION INTRAVENOUS at 18:48

## 2019-04-08 RX ADMIN — CIPROFLOXACIN 400 MG: 2 INJECTION, SOLUTION INTRAVENOUS at 06:29

## 2019-04-08 RX ADMIN — METRONIDAZOLE 500 MG: 500 INJECTION, SOLUTION INTRAVENOUS at 02:23

## 2019-04-08 RX ADMIN — ASCORBIC ACID, VITAMIN A PALMITATE, CHOLECALCIFEROL, THIAMINE HYDROCHLORIDE, RIBOFLAVIN-5 PHOSPHATE SODIUM, PYRIDOXINE HYDROCHLORIDE, NIACINAMIDE, DEXPANTHENOL, ALPHA-TOCOPHEROL ACETATE, VITAMIN K1, FOLIC ACID, BIOTIN, CYANOCOBALAMIN: 200; 3300; 200; 6; 3.6; 6; 40; 15; 10; 150; 600; 60; 5 INJECTION, SOLUTION INTRAVENOUS at 13:58

## 2019-04-08 RX ADMIN — MELATONIN TAB 3 MG 3 MG: 3 TAB at 21:46

## 2019-04-08 RX ADMIN — METRONIDAZOLE 500 MG: 500 INJECTION, SOLUTION INTRAVENOUS at 08:52

## 2019-04-08 ASSESSMENT — ACTIVITIES OF DAILY LIVING (ADL)
ADLS_ACUITY_SCORE: 15

## 2019-04-08 ASSESSMENT — MIFFLIN-ST. JEOR: SCORE: 1715.29

## 2019-04-08 NOTE — PROGRESS NOTES
Focus: ostomy  D: 4/1 Exp Lap and colostomy, pt on TPN, pouch intact and stoma is functioning. Stoma is large, red and edematous with RRC in Os.   Midline incision has increased erythema to periwound; staples intact. Small amount bloody drainage to distal end of incision.   Dr Delcid aware and will address tomorrow.   I: stoma and pouch assessment; order supplies; discussed and provided education on stoma and pouch care      Intake/Output Summary (Last 24 hours) at 4/8/2019 1321  Last data filed at 4/8/2019 0806  Gross per 24 hour   Intake 2468 ml   Output 3395 ml   Net -927 ml     A/P: stoma and pouch intact and functioning.  Will plan a pouch change tomorrow.

## 2019-04-08 NOTE — PLAN OF CARE
Patient remains hospitalized following colostomy takedown and ex-lap with resection and new colostomy, currently POD #13 & 7. Pain controlled with PCA. Bowel sounds active x 4. Passing gas. LBM 4/7/19. Nausea denies. Currently on full liquid diet. IVF continuous LR and TPN, patient continues on IV abx. Incision covered, scant serosanguineous drainage. Ambulating with SBA.

## 2019-04-08 NOTE — PLAN OF CARE
Pt sleeping well after melatonin, denies much pain and not utilizing PCA, ostomy with fair amount of gas but only minimal liquid stool, voiding adequately, tolerating clears, continues flagyl and cirpo via triple lumen PICC, TPN/lipids/LR infusing but no blood return on any lumens - will notify provider this AM, midline with staples pink and scant drainage at bottom of incision with dressing, independent with mobility. Expected discharge TBD.

## 2019-04-08 NOTE — PROGRESS NOTES
COLON & RECTAL SURGERY  PROGRESS NOTE    April 8, 2019  Post-op Day #7 exploratory laparotomy, resection of colocolonic anastomosis, end transverse colostomy      SUBJECTIVE:  No acute events overnight.  Patient reports mild pain surrounding incision.  Minimal bloody drainage from lower aspect of midline incision. Dressing changed this morning.  Tolerating clears without nausea or vomiting.  Passing small amount of soft stool and gas from colostomy.     OBJECTIVE:  Temp:  [98.1  F (36.7  C)-100  F (37.8  C)] 98.1  F (36.7  C)  Pulse:  [88] 88  Heart Rate:  [81-88] 81  Resp:  [20-24] 24  BP: (125-137)/(70-72) 137/72  SpO2:  [94 %-97 %] 94 %    Intake/Output Summary (Last 24 hours) at 4/8/2019 0901  Last data filed at 4/8/2019 0806  Gross per 24 hour   Intake 2725 ml   Output 3645 ml   Net -920 ml       GENERAL:  Awake, alert, no acute distress, sitting up in chair.  HEAD: Nomocephalic atraumatic  SCLERA: anicteric  EXTREMITIES: warm and well perfused  ABDOMEN:  Soft, appropriately tender surrounding incision, mild distension, no rebound or guarding, no peritoneal signs.  Stoma viable with small amount of soft stool and gas in bag.  INCISION:  Minimal bloody drainage from inferior aspect of midline incision.  Staples intact.    LABS:  Lab Results   Component Value Date    WBC 9.1 04/06/2019     Lab Results   Component Value Date    HGB 11.5 04/06/2019     Lab Results   Component Value Date    HCT 34.5 04/06/2019     Lab Results   Component Value Date     04/07/2019     Last Basic Metabolic Panel:  Lab Results   Component Value Date     04/08/2019      Lab Results   Component Value Date    POTASSIUM 3.9 04/08/2019     Lab Results   Component Value Date    CHLORIDE 108 04/08/2019     Lab Results   Component Value Date    JANICE 7.8 04/08/2019     Lab Results   Component Value Date    CO2 26 04/08/2019     Lab Results   Component Value Date    BUN 11 04/08/2019     Lab Results   Component Value Date    CR 0.57  04/08/2019     Lab Results   Component Value Date     04/08/2019       ASSESSMENT/PLAN: POD #7 exploratory laparotomy, resection of colocolonic anastomosis, end transverse colostomy.  AVSS.     1. Continue clear liquid diet for now.  2. Continue TPN until tolerating oral diet.  3. Continue current pain control regimen.  4. Continue antibiotics.  5. Continue lovenox for DVT ppx.      For questions/paging, please contact the CRS office at [phone].    Lida Rodrigues PA-C  [facility]  Phone: [phone]    Colon and Rectal Surgery Attending Note    Patient seen and examined independently.  Agree with above assessment and plan.  POD 13/7  No nausea, minimal pain. Feeling down.  abd soft, stoma pink and productive. Midline wound with mild erythema.   Plan   Continue abx for now. May need to open wound will reassess if still red tomorrow.  Full liquid diet. Continue TPN  Continue ABX.    Joann Delcid MD  [facility]  Office Phone # [phone]

## 2019-04-09 LAB
GLUCOSE BLDC GLUCOMTR-MCNC: 115 MG/DL (ref 70–99)
GLUCOSE BLDC GLUCOMTR-MCNC: 119 MG/DL (ref 70–99)
GLUCOSE BLDC GLUCOMTR-MCNC: 125 MG/DL (ref 70–99)
GLUCOSE BLDC GLUCOMTR-MCNC: 127 MG/DL (ref 70–99)
GLUCOSE BLDC GLUCOMTR-MCNC: 149 MG/DL (ref 70–99)

## 2019-04-09 PROCEDURE — G0463 HOSPITAL OUTPT CLINIC VISIT: HCPCS

## 2019-04-09 PROCEDURE — 25000125 ZZHC RX 250: Performed by: COLON & RECTAL SURGERY

## 2019-04-09 PROCEDURE — 25000128 H RX IP 250 OP 636: Performed by: COLON & RECTAL SURGERY

## 2019-04-09 PROCEDURE — 25800030 ZZH RX IP 258 OP 636: Performed by: COLON & RECTAL SURGERY

## 2019-04-09 PROCEDURE — 25000132 ZZH RX MED GY IP 250 OP 250 PS 637: Performed by: SURGERY

## 2019-04-09 PROCEDURE — 00000146 ZZHCL STATISTIC GLUCOSE BY METER IP

## 2019-04-09 PROCEDURE — 12000000 ZZH R&B MED SURG/OB

## 2019-04-09 PROCEDURE — C9113 INJ PANTOPRAZOLE SODIUM, VIA: HCPCS | Performed by: PHYSICIAN ASSISTANT

## 2019-04-09 PROCEDURE — 25000128 H RX IP 250 OP 636: Performed by: PHYSICIAN ASSISTANT

## 2019-04-09 RX ORDER — OXYCODONE HYDROCHLORIDE 5 MG/1
5-10 TABLET ORAL EVERY 4 HOURS PRN
Status: DISCONTINUED | OUTPATIENT
Start: 2019-04-09 | End: 2019-04-12 | Stop reason: HOSPADM

## 2019-04-09 RX ADMIN — CIPROFLOXACIN 400 MG: 2 INJECTION, SOLUTION INTRAVENOUS at 18:11

## 2019-04-09 RX ADMIN — PANTOPRAZOLE SODIUM 40 MG: 40 INJECTION, POWDER, FOR SOLUTION INTRAVENOUS at 09:50

## 2019-04-09 RX ADMIN — CIPROFLOXACIN 400 MG: 2 INJECTION, SOLUTION INTRAVENOUS at 06:08

## 2019-04-09 RX ADMIN — METRONIDAZOLE 500 MG: 500 INJECTION, SOLUTION INTRAVENOUS at 10:00

## 2019-04-09 RX ADMIN — SODIUM CHLORIDE, POTASSIUM CHLORIDE, SODIUM LACTATE AND CALCIUM CHLORIDE: 600; 310; 30; 20 INJECTION, SOLUTION INTRAVENOUS at 06:08

## 2019-04-09 RX ADMIN — ENOXAPARIN SODIUM 40 MG: 40 INJECTION SUBCUTANEOUS at 12:11

## 2019-04-09 RX ADMIN — INSULIN ASPART 1 UNITS: 100 INJECTION, SOLUTION INTRAVENOUS; SUBCUTANEOUS at 06:36

## 2019-04-09 RX ADMIN — MELATONIN TAB 3 MG 3 MG: 3 TAB at 21:30

## 2019-04-09 RX ADMIN — METRONIDAZOLE 500 MG: 500 INJECTION, SOLUTION INTRAVENOUS at 16:45

## 2019-04-09 RX ADMIN — ASCORBIC ACID, VITAMIN A PALMITATE, CHOLECALCIFEROL, THIAMINE HYDROCHLORIDE, RIBOFLAVIN-5 PHOSPHATE SODIUM, PYRIDOXINE HYDROCHLORIDE, NIACINAMIDE, DEXPANTHENOL, ALPHA-TOCOPHEROL ACETATE, VITAMIN K1, FOLIC ACID, BIOTIN, CYANOCOBALAMIN: 200; 3300; 200; 6; 3.6; 6; 40; 15; 10; 150; 600; 60; 5 INJECTION, SOLUTION INTRAVENOUS at 11:18

## 2019-04-09 RX ADMIN — METRONIDAZOLE 500 MG: 500 INJECTION, SOLUTION INTRAVENOUS at 01:26

## 2019-04-09 ASSESSMENT — ACTIVITIES OF DAILY LIVING (ADL)
ADLS_ACUITY_SCORE: 15
ADLS_ACUITY_SCORE: 15
ADLS_ACUITY_SCORE: 13
ADLS_ACUITY_SCORE: 15

## 2019-04-09 ASSESSMENT — MIFFLIN-ST. JEOR: SCORE: 1704.86

## 2019-04-09 NOTE — PROGRESS NOTES
Nutrition Services - Brief Note   Refer to RD note dated 4/5 for full reassessment    Diet Order: Full Liquid    Nutrition Support: TPN at goal rate of 95 ml/hr x 24 hours: 47208 ml providing 2119 kcal (28 kcal/kg), 114 g protein (1.5 g/kg) and 342 g dex. Daily lipids providing 500 kcal (24% total kcal). GIR = 3.2.    TPN continues to meet 100% estimated needs.    Tolerance:  - Information obtained from chart review and visit with pt  - Pt tolerating full liquids, advanced to Low Fiber this morning but still does not feel hungry  - Pt tolerating TPN  - Ostomy with minimal stool and gas   - Labs reviewed   TGs (4/9): 120  (WNL)   Na, K, Phos WNL  - Medications reviewed       ASSESSED NUTRITION NEEDS PER APPROVED PRACTICE GUIDELINES:  Dosing Weight: 75 kg (adjusted) - based on driest wt since admit 90.9 kg on 3/26 and IBW of 70 kg  Estimated Energy Needs: 1875 - 2250 kcals (25 - 30 Kcal/Kg)  Justification: obese, maintenence  Estimated Protein Needs: 113 - 150 grams protein (1.5-2 g pro/Kg)  Justification: post-op, obesity guidelines  and preservation of lean body  Estimated Fluid Needs: 1500 - 1875 mL (1 mL/Kcal)  Justification: maintenance      Interventions:  Recommendations:  - Continue TPN at goal rate of 95 ml/hr x 24 hours until adequate PO intake. 2280 ml to provide 1619 kcal (22 kcal/kg), 114 g protein (1.5 g/kg) and 342 g dex. GIR = 3.2.   - Discontinue lipids given pt tolerating minimal amounts of PO diet.   - Offered nutrition supplements to help with calorie/protein PO intake: pt not interested and declined at this time.   - CRS as following: ordered calorie counts, TPN to continue until PO intake is adequate        Cami Huddleston, Dietetic Intern

## 2019-04-09 NOTE — PROGRESS NOTES
"[facility] Nurse Inpatient Ostomy Assessment      Assessment of ostomy and needs for:  Transverse  Colostomy      Data:   History:      Per MD note(s):  April 1, 2019 Post-op: Exploratory laparotomy, resection of anastomosis, & colostomy.    Type of ostomy:  Colostomy  Stoma assessment:   ? Size of stoma: 2\" round; beefy red; edematous and protuberant with RRC intubated to Os.   Mucocutaneous Junction (MCJ):  Intact with sutures  Peristomal skin:  Intact and close to suture line  Abdominal assessment: midline incision slightly opened earlier by Dr Delcid and draining serous fluid, small piece of Mesalt tucked down about 3 cm;  old stoma site covered  Output:  scant , brown stool     Intake/Output Summary (Last 24 hours) at 4/9/2019 1635  Last data filed at 4/9/2019 1401  Gross per 24 hour   Intake 3790 ml   Output 5590 ml   Net -1800 ml       Current pouching system:  Coloplast 1 pc     Pain:  None    Diet:       Orders Placed This Encounter      Diet      Low Fiber Diet    Labs:   Recent Labs   Lab Test 04/08/19  0750 04/06/19  0500   ALBUMIN 2.1* 1.9*   HGB  --  11.5*   INR 1.17* 1.21*   WBC  --  9.1           Intervention:     Patient's chart evaluated.      Assessments done today:  Stoma and pouch change; midline incision assessed.     Education: pt is an experienced ostomate; some newer products were discussed with the pt    Prepared for discharge by: Supplies ordered and Discussed new RX and products for the new stoma    Pt registered for ostomy supply samples? On discharge         Assessment:     Stoma assessment: viable, beefy red, moist, edematous and protuberant with RRC and functioning.     Midline incision: erythema, drainage from midline; recommend to remove a few staples to explore and allow deeper packing of wound bed    Learning needs/ comprehension: very experienced ostomate; very supportive spouse    Effectiveness of current pouching/ supply plan: TBD         Plan:     Plan: "   ? Current pouching system: Coloplast 1 pc with ring    Education:  ? Education completed:  Pouch Emptying and Pouch Replacement, How to empty pouch, Removal of pouch, Preparation of new pouch, Cutting out or evaluating a pattern, Applying paste or rings, Applying appliance to abdomen, Peristomal skin care, Diet and hydration / fluid balance, Odor / flatus management and Lifestyle Adjustments; pt expressed needing assistance with pouching at night and information was provided  ? Is pt able to demonstrate: 1. how to empty their pouch?  yes  o Supply company: [employer] is used  o Do WOC Nurse recommend home care ? no

## 2019-04-09 NOTE — PLAN OF CARE
Cared for patient from [id number]. Patient remains hospitalized following ostomy takedown, currently POD #13, as well as exp lap with new ostomy placement,currently POD #7. Pain controlled with Dilaudid PCA, though use is minimal. Bowel sounds  hypoactive this evening. Passing gas and stool via ostomy. Denies nausea. Currently  tolerating full liquid diet, with IVF and TPN. Midline incision with scant amount of drainage from bottom, and noted erythema. Stoma site CDI. Continues on IV abx. Voiding  without difficulty. Ambulating with standby assist.

## 2019-04-09 NOTE — PLAN OF CARE
Vitals stable. Denies pain. PCA of Dilaudid with minimal usage. Ostomy with stool and gas, red russell in place. Ostomy red, edematous, protruding. Dressing with small amount of drainage over lower staples. TPN and lipids infusing. Blood sugars stable.

## 2019-04-09 NOTE — PLAN OF CARE
Ambulatory Status:  Pt up ad chayo, ambulated in hallway x1.  VSS, afebrile.  Pain:  Pain with dressing changes but denies intervention   Resp: LS clear, on RA  GI:  Denies nausea.  Fair appetite and advanced to low residue diet.  BS active. + Passing flatus.  Last BM 4/9 via ostomy. Red/prodruding/edematous stoma. Ostomy pouch changed per WOC  :  WDL  Skin:  Midline closed with staples, dressings changed per CRS, old ostomy site packed per RN  Tx:  IV cipro and flagyl   Labs:  BS  125 and 119  Consults:  CRS, WOC, nutrition   Disposition:  Pt and wife eager to discharge, TBD per CRS

## 2019-04-09 NOTE — PROGRESS NOTES
COLON & RECTAL SURGERY  PROGRESS NOTE    April 9, 2019  Post-op Day # 8    SUBJECTIVE:  Feeling better this morning. Pain controlled. Tolerating liquids, still doesn't feel that hungry. Stoma functioning.    OBJECTIVE:  Temp:  [98.1  F (36.7  C)-99.6  F (37.6  C)] 98.5  F (36.9  C)  Pulse:  [94] 94  Heart Rate:  [81-90] 90  Resp:  [20-28] 24  BP: (111-137)/(63-72) 111/63  SpO2:  [93 %-96 %] 94 %    Intake/Output Summary (Last 24 hours) at 4/9/2019 0717  Last data filed at 4/9/2019 0136  Gross per 24 hour   Intake 3373 ml   Output 3765 ml   Net -392 ml       GENERAL:  Awake, alert, no acute distress  ABDOMEN:  Soft, minimally tender, non-distended. No guarding, rigidity, or peritoneal signs. Stoma mildly edematous but healthy with red rubber in place and gas and liquid stool in bag.  INCISION: intact with staples. Erythema along upper 1/2 of wound under stoma bag. Probed with return of bloody fluid consistent with fat necrosis.    LABS:  Lab Results   Component Value Date    WBC 9.1 04/06/2019     Lab Results   Component Value Date    HGB 11.5 04/06/2019     Lab Results   Component Value Date    HCT 34.5 04/06/2019     Lab Results   Component Value Date     04/07/2019     Last Basic Metabolic Panel:  Lab Results   Component Value Date     04/08/2019      Lab Results   Component Value Date    POTASSIUM 3.9 04/08/2019     Lab Results   Component Value Date    CHLORIDE 108 04/08/2019     Lab Results   Component Value Date    JANICE 7.8 04/08/2019     Lab Results   Component Value Date    CO2 26 04/08/2019     Lab Results   Component Value Date    BUN 11 04/08/2019     Lab Results   Component Value Date    CR 0.57 04/08/2019     Lab Results   Component Value Date     04/08/2019       ASSESSMENT/PLAN:POD #14/8 colostomy takedown and then exploratory laparotomy, resection of colocolonic anastomosis, end transverse colostomy.  AVSS.      - Cont FLD for now, likely advance later today  - Continue TPN until  taking adequate PO.  - Continue current pain control regimen.  - Continue antibiotics. Monitor wound, may remove a few inferior staples and pack if ongoing drainage  - Continue lovenox for DVT ppx.    For questions/paging, please contact the CRS office at [phone].    Raj Casanova MD  Colorectal Surgery Fellow  [facility]  Phone: [phone]    Colon and Rectal Surgery Attending Note    Patient seen and examined independently.  Agree with above assessment and plan.  Feeling better, minimal pain. No nausea. Stoma functioning.  abd round, stoma pink and productive. Incision with erythema, probed the top with several CC of serosang fluid, no pus, inferiorly there was fluid too.  Staples are under the stoma appliance so did not remove.  May need to remove and pack wound with stoma appliacne change.    Plan  advance to low fiber diet.  Continue abx  Stoma and wound care.   Continue TPN for now. calorie count.    Joann Delcid MD  [facility]  Office Phone # [phone]

## 2019-04-10 LAB
BASOPHILS # BLD AUTO: 0 10E9/L (ref 0–0.2)
BASOPHILS NFR BLD AUTO: 0.3 %
DIFFERENTIAL METHOD BLD: ABNORMAL
EOSINOPHIL # BLD AUTO: 0.1 10E9/L (ref 0–0.7)
EOSINOPHIL NFR BLD AUTO: 0.5 %
ERYTHROCYTE [DISTWIDTH] IN BLOOD BY AUTOMATED COUNT: 14.4 % (ref 10–15)
GLUCOSE BLDC GLUCOMTR-MCNC: 122 MG/DL (ref 70–99)
GLUCOSE BLDC GLUCOMTR-MCNC: 131 MG/DL (ref 70–99)
GLUCOSE BLDC GLUCOMTR-MCNC: 132 MG/DL (ref 70–99)
GLUCOSE BLDC GLUCOMTR-MCNC: 134 MG/DL (ref 70–99)
GLUCOSE BLDC GLUCOMTR-MCNC: 143 MG/DL (ref 70–99)
GLUCOSE BLDC GLUCOMTR-MCNC: 151 MG/DL (ref 70–99)
HCT VFR BLD AUTO: 35.7 % (ref 40–53)
HGB BLD-MCNC: 11.7 G/DL (ref 13.3–17.7)
IMM GRANULOCYTES # BLD: 0.2 10E9/L (ref 0–0.4)
IMM GRANULOCYTES NFR BLD: 1.7 %
LYMPHOCYTES # BLD AUTO: 1.5 10E9/L (ref 0.8–5.3)
LYMPHOCYTES NFR BLD AUTO: 15.9 %
MACROCYTES BLD QL SMEAR: PRESENT
MAGNESIUM SERPL-MCNC: 2.2 MG/DL (ref 1.6–2.3)
MCH RBC QN AUTO: 33.7 PG (ref 26.5–33)
MCHC RBC AUTO-ENTMCNC: 32.8 G/DL (ref 31.5–36.5)
MCV RBC AUTO: 103 FL (ref 78–100)
MONOCYTES # BLD AUTO: 0.9 10E9/L (ref 0–1.3)
MONOCYTES NFR BLD AUTO: 9.7 %
NEUTROPHILS # BLD AUTO: 6.6 10E9/L (ref 1.6–8.3)
NEUTROPHILS NFR BLD AUTO: 71.9 %
NRBC # BLD AUTO: 0 10*3/UL
NRBC BLD AUTO-RTO: 0 /100
PHOSPHATE SERPL-MCNC: 3.3 MG/DL (ref 2.5–4.5)
PLATELET # BLD AUTO: 269 10E9/L (ref 150–450)
PLATELET # BLD EST: ABNORMAL 10*3/UL
POTASSIUM SERPL-SCNC: 4.2 MMOL/L (ref 3.4–5.3)
RBC # BLD AUTO: 3.47 10E12/L (ref 4.4–5.9)
WBC # BLD AUTO: 9.2 10E9/L (ref 4–11)

## 2019-04-10 PROCEDURE — 25800030 ZZH RX IP 258 OP 636: Performed by: COLON & RECTAL SURGERY

## 2019-04-10 PROCEDURE — 25000128 H RX IP 250 OP 636: Performed by: PHYSICIAN ASSISTANT

## 2019-04-10 PROCEDURE — 00000146 ZZHCL STATISTIC GLUCOSE BY METER IP

## 2019-04-10 PROCEDURE — 83735 ASSAY OF MAGNESIUM: CPT | Performed by: COLON & RECTAL SURGERY

## 2019-04-10 PROCEDURE — C9113 INJ PANTOPRAZOLE SODIUM, VIA: HCPCS | Performed by: PHYSICIAN ASSISTANT

## 2019-04-10 PROCEDURE — 84132 ASSAY OF SERUM POTASSIUM: CPT | Performed by: COLON & RECTAL SURGERY

## 2019-04-10 PROCEDURE — 25000128 H RX IP 250 OP 636: Performed by: COLON & RECTAL SURGERY

## 2019-04-10 PROCEDURE — 25000125 ZZHC RX 250: Performed by: COLON & RECTAL SURGERY

## 2019-04-10 PROCEDURE — 12000000 ZZH R&B MED SURG/OB

## 2019-04-10 PROCEDURE — 25000132 ZZH RX MED GY IP 250 OP 250 PS 637: Performed by: SURGERY

## 2019-04-10 PROCEDURE — 84100 ASSAY OF PHOSPHORUS: CPT | Performed by: COLON & RECTAL SURGERY

## 2019-04-10 PROCEDURE — 85025 COMPLETE CBC W/AUTO DIFF WBC: CPT | Performed by: COLON & RECTAL SURGERY

## 2019-04-10 RX ADMIN — ENOXAPARIN SODIUM 40 MG: 40 INJECTION SUBCUTANEOUS at 13:07

## 2019-04-10 RX ADMIN — METRONIDAZOLE 500 MG: 500 INJECTION, SOLUTION INTRAVENOUS at 16:41

## 2019-04-10 RX ADMIN — SODIUM CHLORIDE, POTASSIUM CHLORIDE, SODIUM LACTATE AND CALCIUM CHLORIDE: 600; 310; 30; 20 INJECTION, SOLUTION INTRAVENOUS at 18:16

## 2019-04-10 RX ADMIN — ASCORBIC ACID, VITAMIN A PALMITATE, CHOLECALCIFEROL, THIAMINE HYDROCHLORIDE, RIBOFLAVIN-5 PHOSPHATE SODIUM, PYRIDOXINE HYDROCHLORIDE, NIACINAMIDE, DEXPANTHENOL, ALPHA-TOCOPHEROL ACETATE, VITAMIN K1, FOLIC ACID, BIOTIN, CYANOCOBALAMIN: 200; 3300; 200; 6; 3.6; 6; 40; 15; 10; 150; 600; 60; 5 INJECTION, SOLUTION INTRAVENOUS at 08:33

## 2019-04-10 RX ADMIN — METRONIDAZOLE 500 MG: 500 INJECTION, SOLUTION INTRAVENOUS at 01:59

## 2019-04-10 RX ADMIN — CIPROFLOXACIN 400 MG: 2 INJECTION, SOLUTION INTRAVENOUS at 06:09

## 2019-04-10 RX ADMIN — PANTOPRAZOLE SODIUM 40 MG: 40 INJECTION, POWDER, FOR SOLUTION INTRAVENOUS at 08:20

## 2019-04-10 RX ADMIN — METRONIDAZOLE 500 MG: 500 INJECTION, SOLUTION INTRAVENOUS at 09:05

## 2019-04-10 RX ADMIN — MELATONIN TAB 3 MG 3 MG: 3 TAB at 21:07

## 2019-04-10 RX ADMIN — CIPROFLOXACIN 400 MG: 2 INJECTION, SOLUTION INTRAVENOUS at 18:16

## 2019-04-10 ASSESSMENT — ACTIVITIES OF DAILY LIVING (ADL)
ADLS_ACUITY_SCORE: 13

## 2019-04-10 NOTE — PLAN OF CARE
Ambulatory Status:  Pt up Ind in RM/ SBA in halls.  VS:  stable; afebrile.  Pain:  1/10 in abdomen; declines meds.  Resp: LS clear on RA.  GI:  denies nausea.  poor appetite and on low fiber diet.  BS active.  Passing flatus.  Last BM 4/9. Ostomy in place  :  voiding.  Skin:  abdominal midline incision closed with staples (WOC nurse applied dressing). Old ostomy site packed dressing CDI.  Tx:  cipro\flagyl  Labs:  , 125.  Consults:  colorectal.  Disposition:  TBD.

## 2019-04-10 NOTE — PLAN OF CARE
Pt VSS  Minimal pain to the Abdomen, denies need for meds  MD present  and changed the dressing to ABD, removed several staples and packed with gauze  Encouraged intake  PICC line in place infusing TPN

## 2019-04-10 NOTE — PLAN OF CARE
VS: WDL  Orientation: WDL  Tele: NA  Glucose checks: Q4H. 134 at 1400  Activity: independent with wife  Diet: low fiber plus TPN @95  GI: ostomy gas and stool  : WDL  Respiratory: WDL  IV: LR @30,triple lumen PICC  Plan: change old ostomy dressing with packing, change packing in abdominal incision BID with Mesalt packing

## 2019-04-10 NOTE — PROGRESS NOTES
COLON & RECTAL SURGERY  PROGRESS NOTE    April 10, 2019  Post-op Day # 15/9    SUBJECTIVE:  No acute events overnight.  Tolerating low fiber diet.  No nausea/vomiting.  Pain is controlled.     OBJECTIVE:  Temp:  [98.5  F (36.9  C)-98.8  F (37.1  C)] 98.5  F (36.9  C)  Heart Rate:  [81-89] 81  Resp:  [20-28] 22  BP: (109-119)/(62-71) 119/71  SpO2:  [95 %-96 %] 95 %    Intake/Output Summary (Last 24 hours) at 4/10/2019 0829  Last data filed at 4/10/2019 0600  Gross per 24 hour   Intake 4331.83 ml   Output 5225 ml   Net -893.17 ml       GENERAL:  Awake, alert, no acute distress, lying in bed  HEAD: Nomocephalic atraumatic  SCLERA: anicteric  EXTREMITIES: warm and well perfused  ABDOMEN:  Soft, minimal tenderness, non-distended, no rebound or guarding, no peritoneal signs.  Stoma viable with gas and stool in bag.  INCISION:  Improved erythema along upper half of wound today.  Continued bloody drainage present.  Haim intact.    LABS:  Lab Results   Component Value Date    WBC 9.2 04/10/2019     Lab Results   Component Value Date    HGB 11.7 04/10/2019     Lab Results   Component Value Date    HCT 35.7 04/10/2019     Lab Results   Component Value Date     04/10/2019     Last Basic Metabolic Panel:  Lab Results   Component Value Date     04/08/2019      Lab Results   Component Value Date    POTASSIUM 3.9 04/08/2019     Lab Results   Component Value Date    CHLORIDE 108 04/08/2019     Lab Results   Component Value Date    JANICE 7.8 04/08/2019     Lab Results   Component Value Date    CO2 26 04/08/2019     Lab Results   Component Value Date    BUN 11 04/08/2019     Lab Results   Component Value Date    CR 0.57 04/08/2019     Lab Results   Component Value Date     04/08/2019       ASSESSMENT/PLAN: POD#15/9 colostomy takedown and then exploratory laparotomy, resection of colocolonic anastomosis, end transverse colostomy.  AVSS.  Labs unremarkable.    1. A few staples were removed from superior aspect of  midline incision and packed with moist guaze today. Dressing should be changed twice daily and prn when saturated.  2. Continue low fiber diet.  3. Continue TPN for now, calorie count.  4. Continue abx.  5. Encourage OOB.    For questions/paging, please contact the CRS office at [phone].    Lida Rodrigues PA-C  [facility]  Phone: [phone]    Colon and Rectal Surgery Attending Note    Patient seen and examined independently.  Agree with above assessment and plan.  Feeling better each day.   Tolerating low fiber diet. No fevers or chills. No nausea, stoma functioning.  Abd soft, incision with less erythema but still with fluid egress. I removed 2 staples from the top and just below the stoma appliance.   Packed with moist gauze.  Plan  stoma and wound care  Continue abx   Continue calorie count and TPN for now.  Encourage ambulation.    Joann Delcid MD  [facility]  Office Phone # [phone]

## 2019-04-10 NOTE — PLAN OF CARE
Pt alert and oriented, up ad chayo in the room. Pt voiding per urinal. Ostomy with flatus, no stool overnight. Pt received melatonin at HS, cares clustered. Pt denies needing pain medication overnight. PTN infusing at 95 ml/hour. Abdominal dressing intact, small amount of drainage. Pt on IV cipro and flagyl via PICC. PICC line radhames blood well this am.

## 2019-04-10 NOTE — PROGRESS NOTES
CLINICAL NUTRITION SERVICES - REASSESSMENT NOTE      Recommendations Ordered by Registered Dietitian (RD):   - Continue TPN at goal rate of 95 ml/hr x 24 hours - 2280 ml to provide 1619 kcal (22 kcal/kg), 114 g protein (1.5 g/kg) and 342 g dex. GIR = 3.2.   Total fluids to = 125 ml/hr. Stop TPN tomorrow unless intakes decline.  - Continue low fiber diet per MD  - Continue calorie counts per MD, started today   - Vanilla Plus2 Shakes at 2:00pm daily     Malnutrition (4/10):  % Weight Loss:  Weight loss does not meet criteria for malnutrition   % Intake:  Decreased intake does not meet criteria   Subcutaneous Fat Loss:  None observed  Muscle Loss:  None observed  Fluid Retention:  Edema around old stoma sight - not nutrition-related      Malnutrition Diagnosis: Patient does not meet two of the above criteria necessary for diagosing for malnutrition       EVALUATION OF PROGRESS TOWARD GOALS   Diet:  Low Fiber   -NPO 3/26  -Liquids 3/26-3/30  -Low fiber 3/31  -NPO 4/1 - 4/6  -Liquids 4/7 - 4/9    Nutrition Support:    TPN at goal rate since 4/6 of 95 ml/hr x 24 hours. 2280 ml to provide 1619 kcal (22 kcal/kg), 114 g protein (1.5 g/kg) and 342 g dex. GIR = 3.2.      Intake/Tolerance:    - 1 meal yesterday: scrambled eggs, English muffin, pears, milk, juice, crackers  - slowly ate throughout the day   - No meals yet today - is planning to order an omelet for breakfast    - Barriers to intake: poor appetite  - No nausea  - TPN running at goal since 4/6; lipids off as of yesterday       ASSESSED NUTRITION NEEDS PER APPROVED PRACTICE GUIDELINES:     Dosing Weight: 75 kg (adjusted) - based on driest wt since admit 90.9 kg on 3/26 and IBW of 70 kg  Estimated Energy Needs: 1875 - 2250 kcals (25 - 30 Kcal/Kg)  Justification: obese, maintenence  Estimated Protein Needs: 113 - 150 grams protein (1.5-2 g pro/Kg)  Justification: post-op, obesity guidelines  and preservation of lean body mass   Estimated Fluid Needs: 1500 - 1875 mL  (1 mL/Kcal)  Justification: maintenance       NEW FINDINGS:   - Labs reviewed   Cr 0.57 (L)   BUN on 4/8 11 (WNL)   K 4.2 on 4/10 (WNL)   Mag 2.2 on 4/10 (WNL)   Phos 3.3 on 4/10 (WNL)   BGM < 180 (WNL)   ALT 12 on 4/8 (WNL)   AST 30 on 4/8 (WNL)  - Medications reviewed   LR @ 30 ml/hr  - Ostomy with flatus, stooling   - CRS following: TPN until PO intake adequate   - Current weight: 91.5 kg (stable throughout admit besides one outlying wt on 4/7)  Vitals:    04/05/19 1435 04/06/19 0622 04/07/19 0619 04/08/19 0904   Weight: 92.7 kg (204 lb 6.4 oz) 96.7 kg (213 lb 3.2 oz) 93.3 kg (205 lb 11.2 oz) 92.6 kg (204 lb 1.6 oz)    04/09/19 0656   Weight: 91.5 kg (201 lb 12.8 oz)         Previous Goals:   TPN to advance to goal within 72 hours.  Evaluation: Met  TPN at goal to meet % estimated needs  Evaluation: Not met       Previous Nutrition Diagnosis:   Inadequate protein-energy intake related to limited diets since admit (3/23), altered GI status with recent surgery, and poor appetite as evidenced by <50% intake >5 days.  Evaluation: Completed       CURRENT NUTRITION DIAGNOSIS  Inadequate oral intake related to altered GI status, poor appetite, and diet advanced to low fiber 4/9  as evidenced by reliance on TPN to meet needs, <50% PO intake >5 days.       INTERVENTIONS  Recommendations / Nutrition Prescription  - Continue TPN at goal rate of 95 ml/hr x 24 hours - 2280 ml to provide 1619 kcal (22 kcal/kg), 114 g protein (1.5 g/kg) and 342 g dex. GIR = 3.2.   Total fluids to = 125 ml/hr. Stop TPN tomorrow unless intakes decline.  - Continue low fiber diet per MD  - Continue calorie counts per MD, started today   - Vanilla Plus2 Shakes at 2:00pm daily      Implementation  PN Schedule: continue as above.     Medical Food Supplement: as above.     Collaboration and Referral of Care - discussed pt during IDT rounds.     Goals  Pt to consume >/=50% meals BID + 1 supplement daily.   TPN to continue to provide 22 kcal/kg  and 1.5 g protein/kg until PO intakes meet >/=60% of estimated needs.       MONITORING AND EVALUATION:  Progress towards goals will be monitored and evaluated per protocol and Practice Guidelines      Cami Huddleston, Dietetic Intern

## 2019-04-11 LAB
GLUCOSE BLDC GLUCOMTR-MCNC: 128 MG/DL (ref 70–99)
GLUCOSE BLDC GLUCOMTR-MCNC: 135 MG/DL (ref 70–99)
GLUCOSE BLDC GLUCOMTR-MCNC: 138 MG/DL (ref 70–99)

## 2019-04-11 PROCEDURE — 25000128 H RX IP 250 OP 636: Performed by: COLON & RECTAL SURGERY

## 2019-04-11 PROCEDURE — 25000128 H RX IP 250 OP 636: Performed by: PHYSICIAN ASSISTANT

## 2019-04-11 PROCEDURE — 12000000 ZZH R&B MED SURG/OB

## 2019-04-11 PROCEDURE — 25000125 ZZHC RX 250: Performed by: COLON & RECTAL SURGERY

## 2019-04-11 PROCEDURE — 00000146 ZZHCL STATISTIC GLUCOSE BY METER IP

## 2019-04-11 PROCEDURE — C9113 INJ PANTOPRAZOLE SODIUM, VIA: HCPCS | Performed by: PHYSICIAN ASSISTANT

## 2019-04-11 PROCEDURE — 25000132 ZZH RX MED GY IP 250 OP 250 PS 637: Performed by: SURGERY

## 2019-04-11 PROCEDURE — 40000902 ZZH STATISTIC WOC PT EDUCATION, 16-30 MIN

## 2019-04-11 RX ORDER — PANTOPRAZOLE SODIUM 40 MG/1
40 TABLET, DELAYED RELEASE ORAL
Status: DISCONTINUED | OUTPATIENT
Start: 2019-04-12 | End: 2019-04-12 | Stop reason: HOSPADM

## 2019-04-11 RX ADMIN — ASCORBIC ACID, VITAMIN A PALMITATE, CHOLECALCIFEROL, THIAMINE HYDROCHLORIDE, RIBOFLAVIN-5 PHOSPHATE SODIUM, PYRIDOXINE HYDROCHLORIDE, NIACINAMIDE, DEXPANTHENOL, ALPHA-TOCOPHEROL ACETATE, VITAMIN K1, FOLIC ACID, BIOTIN, CYANOCOBALAMIN: 200; 3300; 200; 6; 3.6; 6; 40; 15; 10; 150; 600; 60; 5 INJECTION, SOLUTION INTRAVENOUS at 03:18

## 2019-04-11 RX ADMIN — PANTOPRAZOLE SODIUM 40 MG: 40 INJECTION, POWDER, FOR SOLUTION INTRAVENOUS at 08:37

## 2019-04-11 RX ADMIN — CIPROFLOXACIN 400 MG: 2 INJECTION, SOLUTION INTRAVENOUS at 18:52

## 2019-04-11 RX ADMIN — MELATONIN TAB 3 MG 3 MG: 3 TAB at 21:42

## 2019-04-11 RX ADMIN — METRONIDAZOLE 500 MG: 500 INJECTION, SOLUTION INTRAVENOUS at 02:14

## 2019-04-11 RX ADMIN — ENOXAPARIN SODIUM 40 MG: 40 INJECTION SUBCUTANEOUS at 13:01

## 2019-04-11 RX ADMIN — METRONIDAZOLE 500 MG: 500 INJECTION, SOLUTION INTRAVENOUS at 17:00

## 2019-04-11 RX ADMIN — METRONIDAZOLE 500 MG: 500 INJECTION, SOLUTION INTRAVENOUS at 08:34

## 2019-04-11 RX ADMIN — CIPROFLOXACIN 400 MG: 2 INJECTION, SOLUTION INTRAVENOUS at 06:30

## 2019-04-11 ASSESSMENT — ACTIVITIES OF DAILY LIVING (ADL)
ADLS_ACUITY_SCORE: 13

## 2019-04-11 ASSESSMENT — MIFFLIN-ST. JEOR: SCORE: 1677.19

## 2019-04-11 NOTE — PLAN OF CARE
Orientation: Alert and oriented x4  VSS. 94% on RA.   Tele: N/A. HR 84.   LS: Clear, denies SOB/CARBONE  GI: BS audible/active x4, tolerating low fiber diet, stoma red/protruding, ostomy with moderate output, abdomen tender to touch,  and 128 this shift. Passing gas. Moderate, loose BM in ostomy bag. Denies N/V.   : Adequate urine output. Yes  Skin: Incision covered, CDI  Activity: SBA. Pt slept comfortably throughout shift.   Pain: 0/10  Plan: TPN and calorie count, IV abx, pain control, supportive cares, CRS following, discharge TBD. Continue with current cares.

## 2019-04-11 NOTE — PROGRESS NOTES
"Colon & Rectal Surgery Progress Note             Interval History:   Postop Day #: 16/10  No nausea, eating more. Good stoma out put.                Medications:   I have reviewed this patient's current medications               Physical Exam:   Blood pressure 113/67, pulse 94, temperature 97.4  F (36.3  C), temperature source Oral, resp. rate 20, height 1.727 m (5' 8\"), weight 88.8 kg (195 lb 11.2 oz), SpO2 95 %.    Intake/Output Summary (Last 24 hours) at 4/11/2019 0810  Last data filed at 4/11/2019 0723  Gross per 24 hour   Intake 2800 ml   Output 2575 ml   Net 225 ml     GEN:  alert  ABD:  Soft, stoma pink and productive with RRC  Wound with serous/fibrious changes         Data:        Lab Results   Component Value Date     04/08/2019    Lab Results   Component Value Date    CHLORIDE 108 04/08/2019    Lab Results   Component Value Date    BUN 11 04/08/2019      Lab Results   Component Value Date    POTASSIUM 4.2 04/10/2019    Lab Results   Component Value Date    CO2 26 04/08/2019    Lab Results   Component Value Date    CR 0.57 04/08/2019    CR 0.65 04/07/2019        Lab Results   Component Value Date    HGB 11.7 (L) 04/10/2019    HGB 11.5 (L) 04/06/2019     Lab Results   Component Value Date     04/10/2019     04/07/2019     Lab Results   Component Value Date    WBC 9.2 04/10/2019    WBC 9.1 04/06/2019            Assessment and Plan:   Doing well  Wean tpn to off if acceptable calorie intake.  abx for 1 more day  Wound and stoma teaching.  Possible discharge tomorrow with home care.       Joann Delcid MD  [facility]  Office Phone # [phone]    "

## 2019-04-11 NOTE — PLAN OF CARE
A&O x4  Meds/Drains: cipro Q12, Flagyl Q8, TPN running 95/hr. Triple lumen PICC in left arm. Glucose checks Q4 last readings 132, 122  Vitals: BP: 109/61, HR:85, O2:95% on RA, Resp:28, temp:98.4. Reported no pain  Activity: up independent in room, SBA in halls  Nutrition:TPN running plus low fiber diet and calorie counting. Tolerating fairly.  Skin: Abdomen dressing change for 3 open sites along incision was done @2030, other than that skin intact  GI: Ostomy had moderate stool drainage and gas, bowel sounds active in all quadrants, denied nausea  Urinary: WDL             Subjective:  HPI                    Objective:  System    Physical Exam    General     ROS    Assessment/Plan:    SUBJECTIVE  Subjective changes as noted by pt: No increased pain after last visit. Overall the pain has been about the same this week. This morning he woke up with increased pain on the right side of the low back. Has been doing exercises as instructed, no real change.    Current pain level: 2/10 (5/10 with walking this morning)   Changes in function:  None     Adverse reaction to treatment or activity:  None    OBJECTIVE  Changes in objective findings: Lumbar AROM flexion to ankles, extension major loss; SG mod loss bilat with ERP L, strain R. Improved extension and SG after extension-based movements today.     ASSESSMENT  Rigoberto continues to require intervention to meet STG and LTG's: PT  Patient is progressing as expected.  Response to therapy has shown an improvement in ROM   Progress made towards STG/LTG?  None    PLAN  Current treatment program is being advanced to more complex exercises.    PTA/ATC plan:  N/A    Please refer to the daily flowsheet for treatment today, total treatment time and time spent performing 1:1 timed codes.

## 2019-04-11 NOTE — PROGRESS NOTES
WOC note:     Pouch intact and pt resting in room with spouse. Review and education of wound care to incision using Mesalt down any open areas. Will use moist gauze in the old stoma site.   Experienced ostomate and will continue to use the existing products when discharged.   New RX prepared and samples ordered from [facility].   Supportive spouse.      Intake/Output Summary (Last 24 hours) at 4/11/2019 1528  Last data filed at 4/11/2019 1228  Gross per 24 hour   Intake 1766.5 ml   Output 3625 ml   Net -1858.5 ml     Will continue to follow for discharge Rx.

## 2019-04-11 NOTE — CONSULTS
Care Transition Initial Assessment - SW     Met with: Patient  And wife  Active Problems:    History of colon cancer, stage II    Large bowel anastomotic leak       DATA  Lives With: spouse      Quality of Family Relationships: involved, supportive  Description of Support System: Supportive, Involved     Support Assessment: Adequate family and caregiver support, Adequate social supports.   Identified issues/concerns regarding health management: Pt with wound needs and dressing changes needs at this time. Pt reports having been through the same thing a year ago and feeling that he can manage it again. He reports having no home care following his last surgery.     Resources List: Home Care     Quality of Family Relationships: involved, supportive       ASSESSMENT  Cognitive Status:  Alert and oriented  Concerns to be addressed: Discussed recommendation for home care. Initially pt unsure this is needed. He reports that he is normally independent and lives with his wife in a home. Pts wife reports that pt cares for himself during the day and will likely be doing his dressing changes and wound cares on his own. She then reported that the stitches this time are much closer to the bag and they are open. Discussed that with this change that a home care nurse may be helpful. Pt agreeable and interested in someone coming daily after he is discharged for a period of time. SW offered home care agency choice and pt agreeable to [facility]. Pt requests that they check his insurance for coverage.    SW made referral to [facility] .     PLAN  Patient given options and choices for discharge .  Patient/family is agreeable to the plan?  Yes:   Patient Goals and Preferences: Return home .  Patient anticipates discharging to:  Home with [facility] RN .    Addendum: SW informed by [facility] that they will not be able to do daily visits but can accept pt for home care. They ran benefits and pt noted to have 100% coverage at this time and that it requires pt to  be homebound. SW updated pt and wife with this information. Pt stated understanding and agreement with plan for [facility]. Pts wife requested information on Meals on Wheels. SW provided the phone number/website for this and for Disability Linkage Line for further resources as needed.

## 2019-04-11 NOTE — PROGRESS NOTES
CALORIE COUNT    Current Diet Order:   Low Fiber      Supplement Order:   Vanilla Shake + 1 pkt Beneprotein- 2pm      Approximate Oral Intake for 4/10:   Calories: 842 kcal  Protein: 32 g    Number of Meals Recorded: 2  Number of Snacks Recorded: 1 (Plus2 shake)    Parenteral NS Order:   TPN at goal rate of 95 ml/hr x 24 hours - 2280 ml providing 1619 kcal (22 kcal/kg), 114 g protein (1.5 g/kg) and 342 g dex. GIR = 3.2.       ASSESSED NUTRITION NEEDS PER APPROVED PRACTICE GUIDELINES:  Dosing Weight: 75 kg (adjusted) - based on driest wt since admit 90.9 kg on 3/26 and IBW of 70 kg  Estimated Energy Needs: 1875 - 2250 kcals (25 - 30 Kcal/Kg)  Justification: obese, maintenence  Estimated Protein Needs: 113 - 150 grams protein (1.5-2 g pro/Kg)  Justification: post-op, obesity guidelines  and preservation of lean body mass   Estimated Fluid Needs: 1500 - 1875 mL (1 mL/Kcal)  Justification: maintenance      Summary:   - Pt met <30% of goal protein needs, and <50% of goal energy needs.  - Pt's appetite and intake are improving  - No nausea, vomiting  - Minimal abdominal pain  - Ostomy output:    4/8: 95 ml    4/9: 25 ml   4/10: 100 ml   4/11: 125 ml so far   - Pt liked shake, would like to try chocolate flavor today at 2pm  - Possible discharge tomorrow  - Begin weaning off TPN today       Cami Huddleston, Dietetic Intern

## 2019-04-11 NOTE — PLAN OF CARE
Neuro: A & O x 4. Calm & cooperative.  VS:  VSS  Respiratory: LS  clear.   GI: Incision to ABD. Ostomy to left abd - WDL. + flatus. + stool.   : WDL  Skin: incision to abd. Dressings done today - see flow sheets.   Pain: minimal to abd. Refused interventions at this time.   IV: Tripple lumen PICC to right FA. SL  Transfer: Up independently. Ambulating in hallways.   Diet: Low fiber. Tolerating  well. TPN dc'd this shift.   Plan: TBD. Colorectal following. WOC seeing for wound / ostomy. SW for discharge planning. Possible discharge to home with home care services tomorrow.

## 2019-04-11 NOTE — DISCHARGE INSTRUCTIONS
Home care services have been arranged for the patient.  Home care agency: [facility]  Home care phone: [phone]  Services: Nursing  Instructions: Home care will call to set up first visit within 48 hours.

## 2019-04-12 VITALS
BODY MASS INDEX: 26.02 KG/M2 | SYSTOLIC BLOOD PRESSURE: 115 MMHG | RESPIRATION RATE: 22 BRPM | HEART RATE: 94 BPM | DIASTOLIC BLOOD PRESSURE: 72 MMHG | OXYGEN SATURATION: 97 % | TEMPERATURE: 97.5 F | HEIGHT: 68 IN | WEIGHT: 171.7 LBS

## 2019-04-12 PROCEDURE — 25000132 ZZH RX MED GY IP 250 OP 250 PS 637: Performed by: COLON & RECTAL SURGERY

## 2019-04-12 PROCEDURE — 25000128 H RX IP 250 OP 636: Performed by: COLON & RECTAL SURGERY

## 2019-04-12 PROCEDURE — 40000901 ZZH STATISTIC WOC PT EDUCATION, 0-15 MIN

## 2019-04-12 RX ORDER — SULFAMETHOXAZOLE/TRIMETHOPRIM 800-160 MG
1 TABLET ORAL 2 TIMES DAILY
Qty: 14 TABLET | Refills: 0 | Status: SHIPPED | OUTPATIENT
Start: 2019-04-12 | End: 2019-04-19

## 2019-04-12 RX ADMIN — METRONIDAZOLE 500 MG: 500 INJECTION, SOLUTION INTRAVENOUS at 09:12

## 2019-04-12 RX ADMIN — PANTOPRAZOLE SODIUM 40 MG: 40 TABLET, DELAYED RELEASE ORAL at 05:39

## 2019-04-12 RX ADMIN — CIPROFLOXACIN 400 MG: 2 INJECTION, SOLUTION INTRAVENOUS at 05:38

## 2019-04-12 RX ADMIN — METRONIDAZOLE 500 MG: 500 INJECTION, SOLUTION INTRAVENOUS at 02:23

## 2019-04-12 RX ADMIN — ENOXAPARIN SODIUM 40 MG: 40 INJECTION SUBCUTANEOUS at 13:09

## 2019-04-12 ASSESSMENT — ACTIVITIES OF DAILY LIVING (ADL)
ADLS_ACUITY_SCORE: 13

## 2019-04-12 ASSESSMENT — MIFFLIN-ST. JEOR: SCORE: 1568.33

## 2019-04-12 NOTE — PLAN OF CARE
A&O x4  Vitals: VSS, on RA. Denied pain/nausea  Meds/Drains: Triple lumen PICC, saline locked.   Activity: Independent in room, SBA in halls.   Diet: low fiber, tolerating fairly, drinking well  GI: incision sites CDI, active bowel sounds in all quadrants, passing stool and gas from ostomy. Red russell fell out this evening  : WDL  Cardio: WDL  Resp: deep breathing encouraged lung sounds clear

## 2019-04-12 NOTE — PLAN OF CARE
Patient post op colostomy TD and Colostomy placement secondary to colon leak  Doing well, denies pain, VSS, afebrile  Midline dressing changed twice this shift (second time for pt convenience/teaching for preparation of discharge) two open areas packed with serosang/yellow drainage, small-mod amt. Redness nika-wound top opening  Old colostomy site dressing changed once, healing well, no s/s of infection  Up independently, wife at bedside  Awaiting discharge

## 2019-04-12 NOTE — PROGRESS NOTES
Calorie Count    Current Diet:   Low fiber    Current Supplements:  Vanilla shake + 1 pkt Beneprotein at 2 pm    Date: 4/11/2019  Meals Recorded: 0  Supplements Recorded: 0  Calories consumed - See below  Protein consumed - See below    ASSESSED NUTRITION NEEDS PER APPROVED PRACTICE GUIDELINES:  Dosing Weight: 75 kg (adjusted) - based on driest wt since admit 90.9 kg on 3/26 and IBW of 70 kg  Estimated Energy Needs: 1875 - 2250 kcals (25 - 30 Kcal/Kg)  Justification: obese, maintenence  Estimated Protein Needs: 113 - 150 grams protein (1.5-2 g pro/Kg)  Justification: post-op, obesity guidelines  and preservation of lean body mass   Estimated Fluid Needs: 1500 - 1875 mL (1 mL/Kcal)  Justification: maintenance      Summary:  - No calorie count slips recorded from yesterday.    - However oral intakes slowly improving each day.  - TPN weaned and discontinued yesterday.  - Will formally discontinue calorie counts, removed folder from door.        Geovanna Vu RD, LD  Clinical Dietitian  3rd floor/ICU: [phone]  All other floors: [phone]  Weekend/holiday: [phone]

## 2019-04-12 NOTE — PROGRESS NOTES
Final ostomy note: Rx provided answered all questions; supplies discussed with spouse and pt.   Pt ready for discharge.   [facility] called for samples to home.

## 2019-04-12 NOTE — DISCHARGE SUMMARY
[facility] Discharge Summary      Gab Holloway MRN# [id number]   Age: 58 year old YOB: 1960     Date of Admission:  3/26/2019  Date of Discharge::  4/12/2019  Admitting Physician:  Joann Delcid MD  Discharge Physician:  Joann Delcid MD     PCP:  Bry Andrews    Disposition: Patient discharged from [facility] to Home in stable condition.        Primary Diagnosis:   1. Unwanted colostomy, history of colon cancer.   2. Anastomotic Leak after Colostomy Reversal            Discharge Medications:   Current Discharge Medication List      START taking these medications    Details   enoxaparin (LOVENOX) 40 MG/0.4ML syringe Inject 0.4 mLs (40 mg) Subcutaneous every 24 hours for 13 days  Qty: 5.2 mL, Refills: 0    Associated Diagnoses: Malignant neoplasm of colon, unspecified part of colon (H); Portal vein thrombosis      sulfamethoxazole-trimethoprim (BACTRIM DS/SEPTRA DS) 800-160 MG tablet Take 1 tablet by mouth 2 times daily for 7 days  Qty: 14 tablet, Refills: 0    Associated Diagnoses: Surgical site infection         CONTINUE these medications which have NOT CHANGED    Details   Boswellia Tomasz (BOSWELLIA PO) Take 1 capsule by mouth daily      Sorin, Zingiber officinalis, (SORIN PO) Take 1 capsule by mouth daily      JANIS PO Take 1 capsule by mouth daily Magnesium with [place]      Misc Natural Products (SUPER GREENS PO) Take 1 capsule by mouth daily      TURMERIC PO Take 1 capsule by mouth daily                    Follow Up, Special Instructions:   Discharge diet: Low residue   Discharge activity: No heavy lifting, pushing, pulling for 8 week(s)   Discharge follow-up: Follow up with Dr. Delcid on 4/24/19.   Wound care: Daily dressing changes.  Keep wound clean.  Staples out in ~1 week.              Procedures:   Procedure(s): 3/26/19:  Exploratory laparoscopy and open colostomy takedown.    4/1/19:  Exploratory laparotomy, resection of colocolonic anastomosis,  and end transverse colostomy.           No other procedures performed during this admission            Consultations:   [facility] Nurse  Dietitian           Brief Hospital Summary:   Patient is a 58 year old man who underwent Exploratory laparoscopy and open colostomy takedown on 3/26/19 by Dr. Delcid.  He developed fever with nausea and vomiting days after the procedure.  Repeat CT on 4/1/19 was concerning for an anastomotic leak.  He was taken to the OR for ex lap, resection of colocolonic anastomosis, & end transverse colostomy by Dr. Lan on 4/1/19.  There was feculent peritonitis throughout the abdomen with significant amount of air.  After performing a rigid proctoscopy, there was a large leak found at the end transverse staple line on the transverse portion of the anastomosis.  The small bowel had multiple interloop adhesions and was significantly dilated precluding the creation of a diverting loop ileostomy, therefor, end transverse colostomy was created. He was treated with IV cipro/flagyl.  He had prolonged ileus and required NGT placement and PICC for TPN due to extended period of NPO.  He was successfully weaned off of TPN and was tolerating a low fiber diet at discharge.  Bowel function had returned prior to discharge.  During hospitalization, he developed erythema along the upper 1/2 of midline incision under stoma bag. This was probed with return of bloody fluid consistent with fat necrosis.  A few staples were removed and open wound was packed with moist guaze.  This dressing should continue to be changed twice daily & as needed when saturated. He was given 7 days of Bactrim at discharge.  Pain was controlled on oral pain regimen & he was not needing any narcotics at the time of discharge.  He was also on lovenox during hospitalization for DVT prophylaxis and discharged home with lovenox for a total of 30 days given prior portal vein thrombus after his last surgery in March 2018. He was discharged to  home in stable condition with home care.     Please refer to the full operative summary for details.          Attestation:  I have reviewed today's vital signs, notes, medications, labs and imaging.    Lida Rodrigues PA-C  [facility]          ADDENDUM:  Length of stay: 17 days  Indicate Y or N for the following:  UTI  No  C diff  No  PNA  No  SSI Yes  DVT No  PE  No  CVA No  MI No  Enterocutaneous fistula  No  Peripheral nerve injury  No  Abscess (not adjacent to anastomosis)  No  Leak Yes    Treated with:   Antibiotics YES   Drain  NO   Reoperation  YES  Death within 30 days No  Reintubation  No  Reoperation  Yes   Procedure: Exploratory laparotomy, Resection colocolonic anastomosis, End transverse colostomy, Partial omentectomy.      FOR CANCER CASES: N/A

## 2019-04-12 NOTE — PLAN OF CARE
Pt to D/C to home.  Pt provided with d/c instructions, including new medications, when medications were last given, and when to take them again.  Pt also informed to f/u with Dr. Delcid on 4/24/19.  Pt verbalized understanding of all d/c and f/u instructions.  All questions were answered at this time.  Copy of paperwork sent with pt.  Antibiotic sent with pt.. Patient and wife stated that they have Lovenox at home and did not want to pay for more. Writer highlighted correct Lovenox dose on discharge paperwork and wrote amount of syringes they needed per MD order.  Writer also highlighted [facility] and  wrote Sat. Business hours in case the medication they have at home does not match current order. Patient and wife understood. Writer contacted [facility] to hold Lovenox thru tomorrow in case patient's current supply is not the correct dosing. All personal belongings sent with pt.

## 2019-04-12 NOTE — PLAN OF CARE
Alert and oriented x4. Up independently. Denied pain overnight. Incision covered. Dressing CDI. Ostomy passing stool and gas. PICC line saline locked. Wife at bedside. Plan to discharge home today with home care.

## 2019-04-12 NOTE — CONSULTS
Pt follows with Dr Andrews for primary.  Face sheet updated and [facility] notified.  Silvina DOYLE CTS [id number]

## 2019-04-12 NOTE — PROGRESS NOTES
COLON & RECTAL SURGERY  PROGRESS NOTE    April 12, 2019  Post-op Day # 17/11    SUBJECTIVE:  Patient states he is ready to go home.  He is tolerating a low fiber diet without nausea.  No pain.      OBJECTIVE:  Temp:  [97.3  F (36.3  C)-100.1  F (37.8  C)] 97.3  F (36.3  C)  Heart Rate:  [81-89] 89  Resp:  [20-24] 24  BP: (107-130)/(59-80) 130/80  SpO2:  [92 %-95 %] 92 %    Intake/Output Summary (Last 24 hours) at 4/12/2019 1350  Last data filed at 4/12/2019 1049  Gross per 24 hour   Intake 240 ml   Output 2150 ml   Net -1910 ml       GENERAL:  Awake, alert, no acute distress, sitting in chair  HEAD: Nomocephalic atraumatic  SCLERA: anicteric  EXTREMITIES: warm and well perfused  ABDOMEN:  Soft, non-tender, non-distended, no rebound or guarding, no peritoneal signs.  Stoma viable with gas and stool in bag.      LABS:  Lab Results   Component Value Date    WBC 9.2 04/10/2019     Lab Results   Component Value Date    HGB 11.7 04/10/2019     Lab Results   Component Value Date    HCT 35.7 04/10/2019     Lab Results   Component Value Date     04/10/2019     Last Basic Metabolic Panel:  Lab Results   Component Value Date     04/08/2019      Lab Results   Component Value Date    POTASSIUM 4.2 04/10/2019     Lab Results   Component Value Date    CHLORIDE 108 04/08/2019     Lab Results   Component Value Date    JANICE 7.8 04/08/2019     Lab Results   Component Value Date    CO2 26 04/08/2019     Lab Results   Component Value Date    BUN 11 04/08/2019     Lab Results   Component Value Date    CR 0.57 04/08/2019     Lab Results   Component Value Date     04/08/2019       ASSESSMENT/PLAN: POD# 17/11 colostomy takedown and then exploratory laparotomy, resection of colocolonic anastomosis, end transverse colostomy.  AVSS.    1. Ok to discharge home today with home care.  2. Continue low fiber diet.  3. Continue lovenox for a total of 30 days.  4. Follow up in our clinic on 4/24/19.    For questions/paging, please  contact the CRS office at [phone].    Lida Rodrigues PA-C  [facility]  Phone: [phone]    Colon and Rectal Surgery Attending Note    Patient seen and examined independently.  Agree with above assessment and plan.  Feeling well, eat a good breakfast. No nausea. No fevers. Good UOP  abd soft, stoma pink with improving edema. Wound with clean gauze, no erythema  Plan discharge home today.   Home care for wound.   Follow up with me 4/24.    Joann Delcid MD  [facility]  Office Phone # [phone]

## 2019-04-14 ENCOUNTER — DOCUMENTATION ONLY (OUTPATIENT)
Dept: FAMILY MEDICINE | Facility: CLINIC | Age: 59
End: 2019-04-14

## 2019-04-15 ENCOUNTER — TELEPHONE (OUTPATIENT)
Dept: FAMILY MEDICINE | Facility: CLINIC | Age: 59
End: 2019-04-15

## 2019-04-15 LAB — GLUCOSE BLDC GLUCOMTR-MCNC: 125 MG/DL (ref 70–99)

## 2019-04-15 NOTE — PROGRESS NOTES
[facility] now requests orders and shares plan of care/discharge summaries for some patients through [facility].  Please REPLY TO THIS MESSAGE OR ROUTE BACK TO THE AUTHOR in order to give authorization for orders when needed.  This is considered a verbal order, you will still receive a faxed copy of orders for signature.  Thank you for your assistance in improving collaboration for our patients.    ORDER   SNV 3 x week x 1, 2 x week x 1, 1 x week x 2, 2 as needed       SUMMARY TO MD CHILDS...Client is a 58 year old male who was admitted to the hospital on 3/26 for a reversal of colostomy from colon cancer surgery a year ago. He developed an anastomotic leak. He underwent a second surgery of an expoloratory laparotomy and resection of colocolonic anastomosis and an end transverse colostomy. He developed erythema along midline abdominal incision and parts of the incision were opened up. He was discharged home with wound care to be done to abdominal incision open areas and old colostomy wound. New colostomy, client knowledgeable about managing. He was discharged home on lovenox injections for 13 days and bactrim ds.   VS... /75, P 87, R 15, SPO2 97%, afebrile  WOUND DESCRIPTION AND MEASUREMENTS  Client has 3 openings along midline abdominal incision and open area to right abdomen from old colostomy. Wounds are being packed with saline moistened guaze or packing strip and covered with dry gauze. Uppermost open area measures 3.0cm long by 1.4cm wide and 1.5cm deep. Middle of incision above navel with open area that measures 1.5cm long by 1.0cm wide and 0.8cm deep with a 3.2cm tunnel at 12 o'clock along incison and at bottom of incision is a small opening that is 0.4 cm long by 0.2cm wide and 2cm deep.  On right abdomen is old colostomy site that is healing and measures 1.2cm long by 2.3cm wide and 1.3cm deep.  Client with moderate bloody drainage from wounds. No odor. No signs of infection.  Instructed spouse on wound care and packing and she is able to perform on non nurse days. New colostomy bag intact, stoma appears red and beefy. Did not change appliance at this visit. Client knowledgeable regarding colostomy cares.   PHYSICAL PSYCHOSOCIAL IMPAIRMENT OR LIMITATIONS  Client weakened following surgery. He denies having any pain. Does not use assistive device. Good output from colostomy.  NUTRITION CONCERNS...Reports appetite is fair. States he feels full quickly when he eats. Hydration status appears adequate.   HOME ENVIRONMENT AFFECTING CARE... Lives in [facility] [place].   CAREGIVER SUPPORT... Spouse able and willing to perform cares.   BACKGROUND...Medical history includes hx of colon cancer.   ANALYSIS.. Client at risk following surgery, complications to incision.   RECOMMENDATION Recommendation is skilled nursing for assessment and teaching regarding wound care, signs of infection, colostomy care. Thank you for this referral.

## 2019-04-15 NOTE — TELEPHONE ENCOUNTER
ED / Discharge Outreach Protocol    Patient Contact    Attempt # 1    Was call answered?  No.  Left message on voicemail with information to call me back.    Discharge follow-up: Follow up with Dr. Delcid on 4/24/19.   Wound care: Daily dressing changes.  Keep wound clean.  Staples out in ~1 week.       Inessa Farr RN, BSN

## 2019-04-15 NOTE — TELEPHONE ENCOUNTER
04/12/2019 large bowel anastomotic leak, colostomy status, history of colon cancer, stage li  No future appt  Patty Wright

## 2019-04-16 NOTE — TELEPHONE ENCOUNTER
ED / Discharge Outreach Protocol    Patient Contact    Attempt # 2    Was call answered?  No.  Left message on voicemail with information to call me back.    Kristina Mitchell RN

## 2019-04-17 ENCOUNTER — DOCUMENTATION ONLY (OUTPATIENT)
Dept: OTHER | Facility: CLINIC | Age: 59
End: 2019-04-17

## 2019-04-18 PROBLEM — I48.91 ATRIAL FIBRILLATION (H): Status: ACTIVE | Noted: 2019-04-18

## 2019-04-18 NOTE — TELEPHONE ENCOUNTER
"Hospital/TCU/ED for chronic condition Discharge Protocol    \"Hi, my name is Kristina Mitchell, a registered nurse, and I am calling from [facility].  I am calling to follow up and see how things are going for you after your recent emergency visit/hospital/TCU stay.\"    Tell me how you are doing now that you are home?\" doing well-  Has home care RN coming in to change dressing       Discharge Instructions    \"Let's review your discharge instructions.  What is/are the follow-up recommendations?  Pt. Response: f/u with surgeon     \"Has an appointment with your primary care provider been scheduled?\"   Yes. (confirm)    \"When you see the provider, I would recommend that you bring your medications with you.\"    Medications    \"Tell me what changed about your medicines when you discharged?\"    Changes to chronic meds?    0-1    \"What questions do you have about your medications?\"    None     New diagnoses of heart failure, COPD, diabetes, or MI?    No       Not on warfarin     Medication reconciliation completed? Yes  Was MTM referral placed (*Make sure to put transitions as reason for referral)?   No    Call Summary    \"What questions or concerns do you have about your recent visit and your follow-up care?\"     none    \"If you have questions or things don't continue to improve, we encourage you contact us through the main clinic number (give number).  Even if the clinic is not open, triage nurses are available 24/7 to help you.     We would like you to know that our clinic has extended hours (provide information).  We also have urgent care (provide details on closest location and hours/contact info)\"      \"Thank you for your time and take care!\"       Kristina Mitchell RN        "

## 2019-05-03 ASSESSMENT — ENCOUNTER SYMPTOMS: FEVER: 1

## 2019-05-24 DIAGNOSIS — Z53.9 DIAGNOSIS NOT YET DEFINED: Primary | ICD-10-CM

## 2019-05-24 PROCEDURE — G0180 MD CERTIFICATION HHA PATIENT: HCPCS | Performed by: FAMILY MEDICINE

## 2019-08-21 ENCOUNTER — HOSPITAL ENCOUNTER (OUTPATIENT)
Dept: CT IMAGING | Facility: CLINIC | Age: 59
Discharge: HOME | End: 2019-08-21
Attending: INTERNAL MEDICINE | Admitting: INTERNAL MEDICINE
Payer: COMMERCIAL

## 2019-08-21 DIAGNOSIS — C18.9 MALIGNANT NEOPLASM OF COLON, UNSPECIFIED PART OF COLON (H): ICD-10-CM

## 2019-08-21 DIAGNOSIS — Z08 ENCOUNTER FOR FOLLOW-UP SURVEILLANCE OF COLON CANCER: ICD-10-CM

## 2019-08-21 DIAGNOSIS — Z85.038 ENCOUNTER FOR FOLLOW-UP SURVEILLANCE OF COLON CANCER: ICD-10-CM

## 2019-08-21 PROCEDURE — 74177 CT ABD & PELVIS W/CONTRAST: CPT

## 2019-08-21 PROCEDURE — 25000128 H RX IP 250 OP 636: Performed by: INTERNAL MEDICINE

## 2019-08-21 RX ORDER — IOPAMIDOL 755 MG/ML
86 INJECTION, SOLUTION INTRAVASCULAR ONCE
Status: COMPLETED | OUTPATIENT
Start: 2019-08-21 | End: 2019-08-21

## 2019-08-21 RX ADMIN — IOPAMIDOL 86 ML: 755 INJECTION, SOLUTION INTRAVENOUS at 09:37

## 2019-10-02 ENCOUNTER — HEALTH MAINTENANCE LETTER (OUTPATIENT)
Age: 59
End: 2019-10-02

## 2020-02-05 ENCOUNTER — HOSPITAL ENCOUNTER (OUTPATIENT)
Dept: CT IMAGING | Facility: CLINIC | Age: 60
Discharge: HOME | End: 2020-02-05
Attending: INTERNAL MEDICINE | Admitting: INTERNAL MEDICINE
Payer: COMMERCIAL

## 2020-02-05 DIAGNOSIS — C18.9 COLON CANCER (H): ICD-10-CM

## 2020-02-05 PROCEDURE — 74177 CT ABD & PELVIS W/CONTRAST: CPT

## 2020-02-05 PROCEDURE — 25000125 ZZHC RX 250: Performed by: INTERNAL MEDICINE

## 2020-02-05 PROCEDURE — 25000128 H RX IP 250 OP 636: Performed by: INTERNAL MEDICINE

## 2020-02-05 RX ORDER — IOPAMIDOL 755 MG/ML
500 INJECTION, SOLUTION INTRAVASCULAR ONCE
Status: COMPLETED | OUTPATIENT
Start: 2020-02-05 | End: 2020-02-05

## 2020-02-05 RX ADMIN — IOPAMIDOL 84 ML: 755 INJECTION, SOLUTION INTRAVENOUS at 10:19

## 2020-02-05 RX ADMIN — SODIUM CHLORIDE 61 ML: 9 INJECTION, SOLUTION INTRAVENOUS at 10:19

## 2020-09-09 ENCOUNTER — HOSPITAL ENCOUNTER (OUTPATIENT)
Dept: CT IMAGING | Facility: CLINIC | Age: 60
Discharge: HOME | End: 2020-09-09
Attending: INTERNAL MEDICINE | Admitting: INTERNAL MEDICINE
Payer: COMMERCIAL

## 2020-09-09 DIAGNOSIS — C18.9 PRIMARY MALIGNANT NEOPLASM OF COLON (H): ICD-10-CM

## 2020-09-09 PROCEDURE — 25000128 H RX IP 250 OP 636: Performed by: INTERNAL MEDICINE

## 2020-09-09 PROCEDURE — 25000125 ZZHC RX 250: Performed by: INTERNAL MEDICINE

## 2020-09-09 PROCEDURE — 71260 CT THORAX DX C+: CPT

## 2020-09-09 RX ORDER — IOPAMIDOL 755 MG/ML
500 INJECTION, SOLUTION INTRAVASCULAR ONCE
Status: COMPLETED | OUTPATIENT
Start: 2020-09-09 | End: 2020-09-09

## 2020-09-09 RX ADMIN — IOPAMIDOL 84 ML: 755 INJECTION, SOLUTION INTRAVENOUS at 13:39

## 2020-09-09 RX ADMIN — SODIUM CHLORIDE 61 ML: 9 INJECTION, SOLUTION INTRAVENOUS at 13:39

## 2021-01-15 ENCOUNTER — HEALTH MAINTENANCE LETTER (OUTPATIENT)
Age: 61
End: 2021-01-15

## 2021-09-05 ENCOUNTER — HEALTH MAINTENANCE LETTER (OUTPATIENT)
Age: 61
End: 2021-09-05

## 2022-02-20 ENCOUNTER — HEALTH MAINTENANCE LETTER (OUTPATIENT)
Age: 62
End: 2022-02-20

## 2022-10-22 ENCOUNTER — HEALTH MAINTENANCE LETTER (OUTPATIENT)
Age: 62
End: 2022-10-22

## 2023-04-01 ENCOUNTER — HEALTH MAINTENANCE LETTER (OUTPATIENT)
Age: 63
End: 2023-04-01

## (undated) DEVICE — DRAPE MAYO STAND 23X54 8337

## (undated) DEVICE — ESU CORD MONOPOLAR 10'  E0510

## (undated) DEVICE — SU VICRYL 3-0 TIE 12X18" J904T

## (undated) DEVICE — Device

## (undated) DEVICE — PREP CHLORAPREP 26ML TINTED ORANGE  260815

## (undated) DEVICE — KIT PATIENT POSITIONING PIGAZZI LATEX FREE 40580

## (undated) DEVICE — DRAPE LEGGINGS 8421

## (undated) DEVICE — ENDO TRAP POLYP QUICK CATCH 710201

## (undated) DEVICE — LINEN POUCH DBL 5427

## (undated) DEVICE — BLADE KNIFE SURG 10 371110

## (undated) DEVICE — DRAPE LAP W/ARMBOARD 29410

## (undated) DEVICE — SUCTION TIP POOLE K770

## (undated) DEVICE — SU DERMABOND ADVANCED .7ML DNX12

## (undated) DEVICE — ENDO TROCAR FIRST ENTRY KII FIOS Z-THRD 05X100MM CTF03

## (undated) DEVICE — GOWN XLG DISP 9545

## (undated) DEVICE — ESU PENCIL W/HOLSTER E2350H

## (undated) DEVICE — LINEN TOWEL PACK X10 5473

## (undated) DEVICE — LINEN TOWEL PACK X5 5464

## (undated) DEVICE — ESU GROUND PAD ADULT W/CORD E7507

## (undated) DEVICE — SYSTEM CLEARIFY VISUALIZATION 21-345

## (undated) DEVICE — CATH TRAY FOLEY COUDE SURESTEP 16FR W/DRN BAG LATEX A304416A

## (undated) DEVICE — ESU LIGASURE IMPACT OPEN SEALER/DVDR CVD LG JAW LF4418

## (undated) DEVICE — STPL SKIN 35W APPOSE 8886803712

## (undated) DEVICE — STPL RELOAD LINEAR CUT 75MM TCR75

## (undated) DEVICE — SOL NACL 0.9% IRRIG 1000ML BOTTLE 2F7124

## (undated) DEVICE — SU PDS II 3-0 SH 27" Z316H

## (undated) DEVICE — BAG CLEAR TRASH 1.3M 39X33" P4040C

## (undated) DEVICE — KIT ENDO TURNOVER/PROCEDURE W/CLEAN A SCOPE LINERS 103888

## (undated) DEVICE — PROTECTOR ARM ONE-STEP TRENDELENBURG 40418

## (undated) DEVICE — SU VICRYL 3-0 SH CR 8X18" J774

## (undated) DEVICE — SU VICRYL 0 UR-6 27" J603H

## (undated) DEVICE — SU VICRYL 3-0 SH 27" J316H

## (undated) DEVICE — ENDO FORCEP ENDOJAW BIOPSY 2.8MMX230CM FB-220U

## (undated) DEVICE — SUCTION TIP YANKAUER W/O VENT K86

## (undated) DEVICE — SU PDS II 0 CTX 60" Z990G

## (undated) DEVICE — DRSG TELFA ISLAND 4X10"

## (undated) DEVICE — KIT SIGMOIDOSCOPE W/OBTURATOR 18FR SUCTION KI521/10

## (undated) DEVICE — BLADE CLIPPER 3M 9670

## (undated) DEVICE — SU PDS II 1 CT MONOFIL Z353H

## (undated) DEVICE — NDL SCLEROTHERAPY 25GA CARR-LOCK  00711811

## (undated) DEVICE — ENDO SNARE POLYPECTOMY OVAL 15MM LOOP SD-240U-15

## (undated) DEVICE — DRAPE IOBAN INCISE 23X17" 6650EZ

## (undated) DEVICE — SU VICRYL 2-0 TIE 12X18" J905T

## (undated) DEVICE — CATH TRAY FOLEY SURESTEP 16FR DRAIN BAG STATOCK A899916

## (undated) DEVICE — LINEN DRAPE 54X72" 5467

## (undated) DEVICE — DRAPE SLEEVE 599

## (undated) DEVICE — SU VICRYL 2-0 CT-2 27" J333H

## (undated) DEVICE — SYR 10ML SLIP TIP W/O NDL 303134

## (undated) DEVICE — GLOVE PROTEXIS W/NEU-THERA 7.0  2D73TE70

## (undated) DEVICE — SU VICRYL 0 CT-1 CR 8X18" J740D

## (undated) DEVICE — PACK MAJOR SBA15MAFSI

## (undated) DEVICE — ENDO TROCAR BLUNT TIP KII BALLOON 12X100MM C0R47

## (undated) DEVICE — STPL LINEAR 90 X 3.5MM TA9035S

## (undated) DEVICE — SPONGE RAY-TEC 4X8" 7318

## (undated) DEVICE — ENDO TROCAR 05MM VERSAONE BLADELESS W/STD FIX CAN NONB5STF

## (undated) DEVICE — SU MONOCRYL 4-0 PS-2 27" UND Y426H

## (undated) DEVICE — BLADE CLIPPER SGL USE 9680

## (undated) DEVICE — NDL 22GA 1.5"

## (undated) DEVICE — PREP POVIDONE IODINE SOLUTION 10% 4OZ

## (undated) DEVICE — LINEN HALF SHEET 5512

## (undated) DEVICE — ESU ELEC BLADE 6" COATED E1450-6

## (undated) DEVICE — SU VICRYL 1 MO-4 18" J702D

## (undated) DEVICE — TUBING SUCTION 12"X1/4" N612

## (undated) DEVICE — ESU ELEC BLADE 6" COATED/INSULATED E1455-6

## (undated) DEVICE — SPONGE LAP 18X18" X8435

## (undated) DEVICE — ESU ELEC LEEP EXTENDER DLP-X10

## (undated) DEVICE — STPL SKIN PROXIMATE 35 WIDE PMW35

## (undated) DEVICE — PREP POVIDONE IODINE SCRUB 7.5% 120ML

## (undated) DEVICE — LINEN FULL SHEET 5511

## (undated) DEVICE — ENDO TROCAR BLUNT 100MM W/THRD ANCHOR BLUNTPORT BPT12STS

## (undated) DEVICE — SU VICRYL 2-0 SH 27" J317H

## (undated) DEVICE — PREP SKIN SCRUB TRAY 4461A

## (undated) DEVICE — GOWN IMPERVIOUS SPECIALTY XLG/XLONG 32474

## (undated) DEVICE — ESU LIGASURE LAPAROSCOPIC BLUNT TIP SEALER 5MMX37CM LF1637

## (undated) DEVICE — SU VICRYL 3-0 SH 8X18" UND J864D

## (undated) DEVICE — STPL LINEAR CUT 75MM TLC75

## (undated) DEVICE — DRSG GAUZE 4X4" 8044

## (undated) DEVICE — SOL ADH LIQUID BENZOIN SWAB 0.6ML C1544

## (undated) DEVICE — STPL RELOAD 80 X 3.8MM GIA8038L

## (undated) RX ORDER — LIDOCAINE HYDROCHLORIDE 10 MG/ML
INJECTION, SOLUTION EPIDURAL; INFILTRATION; INTRACAUDAL; PERINEURAL
Status: DISPENSED
Start: 2018-03-01

## (undated) RX ORDER — FENTANYL CITRATE 50 UG/ML
INJECTION, SOLUTION INTRAMUSCULAR; INTRAVENOUS
Status: DISPENSED
Start: 2018-12-04

## (undated) RX ORDER — FENTANYL CITRATE 50 UG/ML
INJECTION, SOLUTION INTRAMUSCULAR; INTRAVENOUS
Status: DISPENSED
Start: 2019-03-26

## (undated) RX ORDER — NEOSTIGMINE METHYLSULFATE 1 MG/ML
VIAL (ML) INJECTION
Status: DISPENSED
Start: 2019-03-26

## (undated) RX ORDER — GLYCOPYRROLATE 0.2 MG/ML
INJECTION INTRAMUSCULAR; INTRAVENOUS
Status: DISPENSED
Start: 2018-03-01

## (undated) RX ORDER — PROPOFOL 10 MG/ML
INJECTION, EMULSION INTRAVENOUS
Status: DISPENSED
Start: 2019-04-01

## (undated) RX ORDER — DEXAMETHASONE SODIUM PHOSPHATE 4 MG/ML
INJECTION, SOLUTION INTRA-ARTICULAR; INTRALESIONAL; INTRAMUSCULAR; INTRAVENOUS; SOFT TISSUE
Status: DISPENSED
Start: 2019-04-01

## (undated) RX ORDER — PHENYLEPHRINE HCL IN 0.9% NACL 1 MG/10 ML
SYRINGE (ML) INTRAVENOUS
Status: DISPENSED
Start: 2018-03-01

## (undated) RX ORDER — FENTANYL CITRATE 50 UG/ML
INJECTION, SOLUTION INTRAMUSCULAR; INTRAVENOUS
Status: DISPENSED
Start: 2019-04-01

## (undated) RX ORDER — ONDANSETRON 2 MG/ML
INJECTION INTRAMUSCULAR; INTRAVENOUS
Status: DISPENSED
Start: 2019-03-26

## (undated) RX ORDER — HEPARIN SODIUM 5000 [USP'U]/.5ML
INJECTION, SOLUTION INTRAVENOUS; SUBCUTANEOUS
Status: DISPENSED
Start: 2019-03-26

## (undated) RX ORDER — PHENYLEPHRINE HCL IN 0.9% NACL 1 MG/10 ML
SYRINGE (ML) INTRAVENOUS
Status: DISPENSED
Start: 2019-04-01

## (undated) RX ORDER — HEPARIN SODIUM 5000 [USP'U]/.5ML
INJECTION, SOLUTION INTRAVENOUS; SUBCUTANEOUS
Status: DISPENSED
Start: 2018-03-01

## (undated) RX ORDER — PROPOFOL 10 MG/ML
INJECTION, EMULSION INTRAVENOUS
Status: DISPENSED
Start: 2018-03-01

## (undated) RX ORDER — PHENYLEPHRINE HCL IN 0.9% NACL 1 MG/10 ML
SYRINGE (ML) INTRAVENOUS
Status: DISPENSED
Start: 2019-03-26

## (undated) RX ORDER — LIDOCAINE HYDROCHLORIDE 10 MG/ML
INJECTION, SOLUTION EPIDURAL; INFILTRATION; INTRACAUDAL; PERINEURAL
Status: DISPENSED
Start: 2019-04-01

## (undated) RX ORDER — GLYCOPYRROLATE 0.2 MG/ML
INJECTION INTRAMUSCULAR; INTRAVENOUS
Status: DISPENSED
Start: 2019-04-01

## (undated) RX ORDER — PROPOFOL 10 MG/ML
INJECTION, EMULSION INTRAVENOUS
Status: DISPENSED
Start: 2019-03-26

## (undated) RX ORDER — ONDANSETRON 2 MG/ML
INJECTION INTRAMUSCULAR; INTRAVENOUS
Status: DISPENSED
Start: 2018-03-01

## (undated) RX ORDER — ACETAMINOPHEN 10 MG/ML
INJECTION, SOLUTION INTRAVENOUS
Status: DISPENSED
Start: 2018-03-01

## (undated) RX ORDER — DEXAMETHASONE SODIUM PHOSPHATE 4 MG/ML
INJECTION, SOLUTION INTRA-ARTICULAR; INTRALESIONAL; INTRAMUSCULAR; INTRAVENOUS; SOFT TISSUE
Status: DISPENSED
Start: 2019-03-26

## (undated) RX ORDER — FENTANYL CITRATE 50 UG/ML
INJECTION, SOLUTION INTRAMUSCULAR; INTRAVENOUS
Status: DISPENSED
Start: 2018-03-01

## (undated) RX ORDER — LIDOCAINE HYDROCHLORIDE 10 MG/ML
INJECTION, SOLUTION EPIDURAL; INFILTRATION; INTRACAUDAL; PERINEURAL
Status: DISPENSED
Start: 2019-03-26

## (undated) RX ORDER — GLYCOPYRROLATE 0.2 MG/ML
INJECTION INTRAMUSCULAR; INTRAVENOUS
Status: DISPENSED
Start: 2019-03-26

## (undated) RX ORDER — ACETAMINOPHEN 325 MG/1
TABLET ORAL
Status: DISPENSED
Start: 2019-03-26

## (undated) RX ORDER — DEXAMETHASONE SODIUM PHOSPHATE 4 MG/ML
INJECTION, SOLUTION INTRA-ARTICULAR; INTRALESIONAL; INTRAMUSCULAR; INTRAVENOUS; SOFT TISSUE
Status: DISPENSED
Start: 2018-03-01

## (undated) RX ORDER — NEOSTIGMINE METHYLSULFATE 1 MG/ML
VIAL (ML) INJECTION
Status: DISPENSED
Start: 2018-03-01

## (undated) RX ORDER — FENTANYL CITRATE 50 UG/ML
INJECTION, SOLUTION INTRAMUSCULAR; INTRAVENOUS
Status: DISPENSED
Start: 2018-02-28